# Patient Record
Sex: FEMALE | Race: WHITE | NOT HISPANIC OR LATINO | Employment: OTHER | URBAN - METROPOLITAN AREA
[De-identification: names, ages, dates, MRNs, and addresses within clinical notes are randomized per-mention and may not be internally consistent; named-entity substitution may affect disease eponyms.]

---

## 2017-08-25 ENCOUNTER — HISTORICAL (OUTPATIENT)
Dept: ADMINISTRATIVE | Facility: HOSPITAL | Age: 67
End: 2017-08-25

## 2017-10-04 ENCOUNTER — HISTORICAL (OUTPATIENT)
Dept: RADIOLOGY | Facility: HOSPITAL | Age: 67
End: 2017-10-04

## 2017-10-24 ENCOUNTER — HISTORICAL (OUTPATIENT)
Dept: RADIOLOGY | Facility: HOSPITAL | Age: 67
End: 2017-10-24

## 2017-10-27 ENCOUNTER — HISTORICAL (OUTPATIENT)
Dept: RESPIRATORY THERAPY | Facility: HOSPITAL | Age: 67
End: 2017-10-27

## 2017-11-01 ENCOUNTER — HISTORICAL (OUTPATIENT)
Dept: ADMINISTRATIVE | Facility: HOSPITAL | Age: 67
End: 2017-11-01

## 2017-12-11 ENCOUNTER — HISTORICAL (OUTPATIENT)
Dept: LAB | Facility: HOSPITAL | Age: 67
End: 2017-12-11

## 2017-12-13 ENCOUNTER — HISTORICAL (OUTPATIENT)
Dept: CARDIOLOGY | Facility: HOSPITAL | Age: 67
End: 2017-12-13

## 2018-02-27 ENCOUNTER — HISTORICAL (OUTPATIENT)
Dept: RADIOLOGY | Facility: HOSPITAL | Age: 68
End: 2018-02-27

## 2018-03-08 ENCOUNTER — HISTORICAL (OUTPATIENT)
Dept: RADIOLOGY | Facility: HOSPITAL | Age: 68
End: 2018-03-08

## 2018-03-20 ENCOUNTER — HISTORICAL (OUTPATIENT)
Dept: CARDIOLOGY | Facility: HOSPITAL | Age: 68
End: 2018-03-20

## 2018-03-22 ENCOUNTER — HISTORICAL (OUTPATIENT)
Dept: ADMINISTRATIVE | Facility: HOSPITAL | Age: 68
End: 2018-03-22

## 2018-03-26 ENCOUNTER — HISTORICAL (OUTPATIENT)
Dept: ADMINISTRATIVE | Facility: HOSPITAL | Age: 68
End: 2018-03-26

## 2018-05-17 ENCOUNTER — HISTORICAL (OUTPATIENT)
Dept: ADMINISTRATIVE | Facility: HOSPITAL | Age: 68
End: 2018-05-17

## 2018-06-01 ENCOUNTER — HISTORICAL (OUTPATIENT)
Dept: ADMINISTRATIVE | Facility: HOSPITAL | Age: 68
End: 2018-06-01

## 2018-07-17 ENCOUNTER — HOSPITAL ENCOUNTER (OUTPATIENT)
Dept: NUTRITION | Facility: HOSPITAL | Age: 68
End: 2018-07-18
Attending: INTERNAL MEDICINE | Admitting: INTERNAL MEDICINE

## 2018-07-31 ENCOUNTER — HISTORICAL (OUTPATIENT)
Dept: ADMINISTRATIVE | Facility: HOSPITAL | Age: 68
End: 2018-07-31

## 2018-08-14 ENCOUNTER — HISTORICAL (OUTPATIENT)
Dept: ADMINISTRATIVE | Facility: HOSPITAL | Age: 68
End: 2018-08-14

## 2018-10-16 ENCOUNTER — TELEPHONE (OUTPATIENT)
Dept: PULMONOLOGY | Facility: CLINIC | Age: 68
End: 2018-10-16

## 2018-10-16 NOTE — TELEPHONE ENCOUNTER
----- Message from Chana Newton sent at 10/16/2018 10:21 AM CDT -----  Contact: [phone]  ..Needs Advice    Reason for call:        Communication Preference:phone     Additional Information: pt would like to know if Dr. Long received her medical records?

## 2018-11-09 ENCOUNTER — TELEPHONE (OUTPATIENT)
Dept: PULMONOLOGY | Facility: CLINIC | Age: 68
End: 2018-11-09

## 2018-11-09 NOTE — TELEPHONE ENCOUNTER
I spoke to the pt to let her know I faxed her referral to Dr Espinal and Dr Grissom's office this morning. Pt verbalized understanding.

## 2018-11-09 NOTE — TELEPHONE ENCOUNTER
----- Message from Tanja Joyce sent at 11/9/2018  3:51 PM CST -----  Contact: Self  Pt is calling because she says Ms. Hussein was supposed to have her scheduled to see a doctor with [facility], per Dr. Long.  She is requesting a returned call.    She can be reached at [phone].    Thank you.

## 2018-11-26 ENCOUNTER — HISTORICAL (OUTPATIENT)
Dept: ADMINISTRATIVE | Facility: HOSPITAL | Age: 68
End: 2018-11-26

## 2019-02-11 ENCOUNTER — HISTORICAL (OUTPATIENT)
Dept: LAB | Facility: HOSPITAL | Age: 69
End: 2019-02-11

## 2019-02-11 LAB
CRP SERPL-MCNC: 0.7 MG/DL
ERYTHROCYTE [SEDIMENTATION RATE] IN BLOOD: 35 MM/HR (ref 0–20)

## 2019-02-26 ENCOUNTER — HISTORICAL (OUTPATIENT)
Dept: ADMINISTRATIVE | Facility: HOSPITAL | Age: 69
End: 2019-02-26

## 2019-02-26 LAB
ALBUMIN SERPL-MCNC: 3.5 GM/DL (ref 3.4–5)
ALP SERPL-CCNC: 96 UNIT/L (ref 38–126)
ALT SERPL-CCNC: 21 UNIT/L (ref 12–78)
AST SERPL-CCNC: 21 UNIT/L (ref 15–37)
BILIRUB SERPL-MCNC: 0.9 MG/DL (ref 0.2–1)
BILIRUBIN DIRECT+TOT PNL SERPL-MCNC: 0.2 MG/DL (ref 0–0.2)
BILIRUBIN DIRECT+TOT PNL SERPL-MCNC: 0.7 MG/DL (ref 0–0.8)
LIVER PROFILE INTERP: NORMAL
PROT SERPL-MCNC: 7.4 GM/DL (ref 6.4–8.2)

## 2019-03-12 ENCOUNTER — HISTORICAL (OUTPATIENT)
Dept: RADIOLOGY | Facility: HOSPITAL | Age: 69
End: 2019-03-12

## 2019-04-02 ENCOUNTER — HISTORICAL (OUTPATIENT)
Dept: PULMONOLOGY | Facility: HOSPITAL | Age: 69
End: 2019-04-02

## 2019-04-05 ENCOUNTER — HISTORICAL (OUTPATIENT)
Dept: ADMINISTRATIVE | Facility: HOSPITAL | Age: 69
End: 2019-04-05

## 2019-08-14 ENCOUNTER — HISTORICAL (OUTPATIENT)
Dept: ADMINISTRATIVE | Facility: HOSPITAL | Age: 69
End: 2019-08-14

## 2019-11-13 ENCOUNTER — HISTORICAL (OUTPATIENT)
Dept: ADMINISTRATIVE | Facility: HOSPITAL | Age: 69
End: 2019-11-13

## 2019-11-13 LAB
FLUAV AG NPH QL IA: NEGATIVE
FLUBV AG NPH QL IA: NEGATIVE

## 2019-12-23 ENCOUNTER — HISTORICAL (OUTPATIENT)
Dept: ADMINISTRATIVE | Facility: HOSPITAL | Age: 69
End: 2019-12-23

## 2020-03-11 ENCOUNTER — HISTORICAL (OUTPATIENT)
Dept: ADMINISTRATIVE | Facility: HOSPITAL | Age: 70
End: 2020-03-11

## 2020-03-12 ENCOUNTER — HISTORICAL (OUTPATIENT)
Dept: ADMINISTRATIVE | Facility: HOSPITAL | Age: 70
End: 2020-03-12

## 2020-07-07 ENCOUNTER — HISTORICAL (OUTPATIENT)
Dept: PULMONOLOGY | Facility: HOSPITAL | Age: 70
End: 2020-07-07

## 2020-07-13 ENCOUNTER — HISTORICAL (OUTPATIENT)
Dept: PULMONOLOGY | Facility: HOSPITAL | Age: 70
End: 2020-07-13

## 2020-07-15 ENCOUNTER — HISTORICAL (OUTPATIENT)
Dept: PULMONOLOGY | Facility: HOSPITAL | Age: 70
End: 2020-07-15

## 2020-07-16 ENCOUNTER — HISTORICAL (OUTPATIENT)
Dept: PULMONOLOGY | Facility: HOSPITAL | Age: 70
End: 2020-07-16

## 2020-07-21 ENCOUNTER — HISTORICAL (OUTPATIENT)
Dept: PULMONOLOGY | Facility: HOSPITAL | Age: 70
End: 2020-07-21

## 2020-07-24 ENCOUNTER — HISTORICAL (OUTPATIENT)
Dept: PULMONOLOGY | Facility: HOSPITAL | Age: 70
End: 2020-07-24

## 2020-07-27 ENCOUNTER — HISTORICAL (OUTPATIENT)
Dept: PULMONOLOGY | Facility: HOSPITAL | Age: 70
End: 2020-07-27

## 2020-07-29 ENCOUNTER — HISTORICAL (OUTPATIENT)
Dept: PULMONOLOGY | Facility: HOSPITAL | Age: 70
End: 2020-07-29

## 2020-07-31 ENCOUNTER — HISTORICAL (OUTPATIENT)
Dept: PULMONOLOGY | Facility: HOSPITAL | Age: 70
End: 2020-07-31

## 2020-08-03 ENCOUNTER — HISTORICAL (OUTPATIENT)
Dept: PULMONOLOGY | Facility: HOSPITAL | Age: 70
End: 2020-08-03

## 2020-08-05 ENCOUNTER — HISTORICAL (OUTPATIENT)
Dept: PULMONOLOGY | Facility: HOSPITAL | Age: 70
End: 2020-08-05

## 2020-08-07 ENCOUNTER — HISTORICAL (OUTPATIENT)
Dept: PULMONOLOGY | Facility: HOSPITAL | Age: 70
End: 2020-08-07

## 2020-08-10 ENCOUNTER — HISTORICAL (OUTPATIENT)
Dept: PULMONOLOGY | Facility: HOSPITAL | Age: 70
End: 2020-08-10

## 2020-08-12 ENCOUNTER — HISTORICAL (OUTPATIENT)
Dept: PULMONOLOGY | Facility: HOSPITAL | Age: 70
End: 2020-08-12

## 2020-08-13 ENCOUNTER — HISTORICAL (OUTPATIENT)
Dept: ADMINISTRATIVE | Facility: HOSPITAL | Age: 70
End: 2020-08-13

## 2020-08-19 ENCOUNTER — HISTORICAL (OUTPATIENT)
Dept: RESPIRATORY THERAPY | Facility: HOSPITAL | Age: 70
End: 2020-08-19

## 2020-08-31 ENCOUNTER — HISTORICAL (OUTPATIENT)
Dept: PULMONOLOGY | Facility: HOSPITAL | Age: 70
End: 2020-08-31

## 2020-09-02 ENCOUNTER — HISTORICAL (OUTPATIENT)
Dept: RESPIRATORY THERAPY | Facility: HOSPITAL | Age: 70
End: 2020-09-02

## 2020-09-04 ENCOUNTER — HISTORICAL (OUTPATIENT)
Dept: PULMONOLOGY | Facility: HOSPITAL | Age: 70
End: 2020-09-04

## 2020-09-09 ENCOUNTER — HISTORICAL (OUTPATIENT)
Dept: PULMONOLOGY | Facility: HOSPITAL | Age: 70
End: 2020-09-09

## 2020-09-11 ENCOUNTER — HISTORICAL (OUTPATIENT)
Dept: PULMONOLOGY | Facility: HOSPITAL | Age: 70
End: 2020-09-11

## 2020-09-14 ENCOUNTER — HISTORICAL (OUTPATIENT)
Dept: PULMONOLOGY | Facility: HOSPITAL | Age: 70
End: 2020-09-14

## 2020-09-16 ENCOUNTER — HISTORICAL (OUTPATIENT)
Dept: PULMONOLOGY | Facility: HOSPITAL | Age: 70
End: 2020-09-16

## 2020-09-21 ENCOUNTER — HISTORICAL (OUTPATIENT)
Dept: PULMONOLOGY | Facility: HOSPITAL | Age: 70
End: 2020-09-21

## 2020-09-23 ENCOUNTER — HISTORICAL (OUTPATIENT)
Dept: PULMONOLOGY | Facility: HOSPITAL | Age: 70
End: 2020-09-23

## 2020-09-25 ENCOUNTER — HISTORICAL (OUTPATIENT)
Dept: PULMONOLOGY | Facility: HOSPITAL | Age: 70
End: 2020-09-25

## 2020-09-28 ENCOUNTER — HISTORICAL (OUTPATIENT)
Dept: PULMONOLOGY | Facility: HOSPITAL | Age: 70
End: 2020-09-28

## 2020-09-30 ENCOUNTER — HISTORICAL (OUTPATIENT)
Dept: PULMONOLOGY | Facility: HOSPITAL | Age: 70
End: 2020-09-30

## 2020-10-05 ENCOUNTER — HISTORICAL (OUTPATIENT)
Dept: PULMONOLOGY | Facility: HOSPITAL | Age: 70
End: 2020-10-05

## 2020-10-07 ENCOUNTER — HISTORICAL (OUTPATIENT)
Dept: PULMONOLOGY | Facility: HOSPITAL | Age: 70
End: 2020-10-07

## 2020-10-14 ENCOUNTER — HISTORICAL (OUTPATIENT)
Dept: PULMONOLOGY | Facility: HOSPITAL | Age: 70
End: 2020-10-14

## 2020-10-16 ENCOUNTER — HISTORICAL (OUTPATIENT)
Dept: PULMONOLOGY | Facility: HOSPITAL | Age: 70
End: 2020-10-16

## 2020-10-19 ENCOUNTER — HISTORICAL (OUTPATIENT)
Dept: PULMONOLOGY | Facility: HOSPITAL | Age: 70
End: 2020-10-19

## 2020-10-21 ENCOUNTER — HISTORICAL (OUTPATIENT)
Dept: PULMONOLOGY | Facility: HOSPITAL | Age: 70
End: 2020-10-21

## 2020-11-02 ENCOUNTER — HISTORICAL (OUTPATIENT)
Dept: PULMONOLOGY | Facility: HOSPITAL | Age: 70
End: 2020-11-02

## 2020-11-05 ENCOUNTER — HISTORICAL (OUTPATIENT)
Dept: PULMONOLOGY | Facility: HOSPITAL | Age: 70
End: 2020-11-05

## 2020-11-06 ENCOUNTER — HISTORICAL (OUTPATIENT)
Dept: PULMONOLOGY | Facility: HOSPITAL | Age: 70
End: 2020-11-06

## 2020-11-09 ENCOUNTER — HISTORICAL (OUTPATIENT)
Dept: PULMONOLOGY | Facility: HOSPITAL | Age: 70
End: 2020-11-09

## 2020-11-10 ENCOUNTER — HISTORICAL (OUTPATIENT)
Dept: PULMONOLOGY | Facility: HOSPITAL | Age: 70
End: 2020-11-10

## 2021-01-04 ENCOUNTER — HISTORICAL (OUTPATIENT)
Dept: ADMINISTRATIVE | Facility: HOSPITAL | Age: 71
End: 2021-01-04

## 2021-01-27 ENCOUNTER — HISTORICAL (OUTPATIENT)
Dept: ADMINISTRATIVE | Facility: HOSPITAL | Age: 71
End: 2021-01-27

## 2021-04-13 ENCOUNTER — HISTORICAL (OUTPATIENT)
Dept: ADMINISTRATIVE | Facility: HOSPITAL | Age: 71
End: 2021-04-13

## 2021-05-19 ENCOUNTER — HISTORICAL (OUTPATIENT)
Dept: RADIOLOGY | Facility: HOSPITAL | Age: 71
End: 2021-05-19

## 2021-06-01 ENCOUNTER — HISTORICAL (OUTPATIENT)
Dept: ADMINISTRATIVE | Facility: HOSPITAL | Age: 71
End: 2021-06-01

## 2021-09-20 ENCOUNTER — HISTORICAL (OUTPATIENT)
Dept: ADMINISTRATIVE | Facility: HOSPITAL | Age: 71
End: 2021-09-20

## 2021-10-25 ENCOUNTER — HISTORICAL (OUTPATIENT)
Dept: ADMINISTRATIVE | Facility: HOSPITAL | Age: 71
End: 2021-10-25

## 2021-11-12 ENCOUNTER — HISTORICAL (OUTPATIENT)
Dept: ADMINISTRATIVE | Facility: HOSPITAL | Age: 71
End: 2021-11-12

## 2021-11-22 ENCOUNTER — HISTORICAL (OUTPATIENT)
Dept: ADMINISTRATIVE | Facility: HOSPITAL | Age: 71
End: 2021-11-22

## 2021-11-23 ENCOUNTER — HISTORICAL (OUTPATIENT)
Dept: ADMINISTRATIVE | Facility: HOSPITAL | Age: 71
End: 2021-11-23

## 2022-01-25 ENCOUNTER — HISTORICAL (OUTPATIENT)
Dept: ADMINISTRATIVE | Facility: HOSPITAL | Age: 72
End: 2022-01-25

## 2022-04-07 ENCOUNTER — HISTORICAL (OUTPATIENT)
Dept: ADMINISTRATIVE | Facility: HOSPITAL | Age: 72
End: 2022-04-07
Payer: MEDICARE

## 2022-04-23 VITALS
BODY MASS INDEX: 26.93 KG/M2 | SYSTOLIC BLOOD PRESSURE: 138 MMHG | HEIGHT: 63 IN | DIASTOLIC BLOOD PRESSURE: 64 MMHG | WEIGHT: 152 LBS | OXYGEN SATURATION: 96 %

## 2022-04-29 DIAGNOSIS — E04.9 GOITER: Primary | ICD-10-CM

## 2022-04-30 NOTE — OP NOTE
DATE OF SURGERY:        SURGEON:  Ewa Ray Jr., MD    PROCEDURES:  Fiberoptic bronchoscopy with bronchoalveolar lavage and bronchial brushings, left lower lobe.    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    COMPLICATIONS:  None.    ANESTHESIA:  Endobronchial xylocaine, Versed and fentanyl IV push.    PROCEDURE IN DETAIL:  Proper informed consent was obtained and signed by patient prior to procedure.  She was placed in supine position and fiberoptic bronchoscope was passed per right naris, which appeared to be normal upper airway, normal vocal cords, freely movable both pre and post bronchoscopy.  Trachea normal.  Nurys sharp.  Left lung airways were patent to all subsegmental bronchi without extrinsic compression or abnormal mucosa.  Right upper lobe was extrinsically compressed, unable to endoscopically image.  There was some extrinsic compression of the right middle lobe and distal bronchus intermedius.  Was able to inspect endobronchial airways, however, to subsegmental bronchi, right middle and right lower lobes, which appeared normal.  No significant secretions encountered.  The left lower lobe was entered with bronchoscope and quantitative bronchoalveolar lavage was performed with good return.  This was followed by fluoroscopic guidance of brushings, various subsegments of right lower lobe posterior.  Estimated blood loss less than 2 cc.  No complications.  The patient will be observed by post anesthesia recovery for conscious sedation and will be released home afterwards.  She will be followed up by me outpatient.        ______________________________  Ewa Ray Jr., MD JR/UB  DD:  11/23/2021  Time:  11:22AM  DT:  11/23/2021  Time:  11:33AM  Job #:  [id number]

## 2022-04-30 NOTE — ED PROVIDER NOTES
Patient:   Cinda Walden            MRN: [id number]            FIN: [id number]               Age:   67 years     Sex:  Female     :  1950   Associated Diagnoses:   Chest pain   Author:   Jeferson Moreno MD      Basic Information   Time seen: Date & time 2018 19:02:00.   History source: Patient, daughter.   Arrival mode: Private vehicle.   History limitation: None.   Additional information: Patient's physician(s): Herberth Amaya MD.      History of Present Illness   The patient presents with 66 yo F who is known to [facility] for hx of CAD and PVD presents with CC of chest pain. A rossana pac  and   I, Dr. Moreno, assumed care of this patient upon entering the room at 1902.     68 y/o  female with history of fungal lung disease , MAC, CAD,  HTN, and PVD presents to the ED with chest pain and sob onset yesterday. Patient states she has chronic sob due to her lung disease but it has been worse than usual and accompanied by chest pain and hypertension. She says her blood pressure was 200/100 yesterday but later went down. Patient reports she talked to Dr. Amaya's office today and they advised her to take Losartan and check her blood pressure and when she checked it was still elevated. She denies cough and fever. Patient is currently on Ranexa for chest pain and took Xanax around 1600. .  The onset was 2018 .  The course/duration of symptoms is constant.  Location: Left chest. Radiating pain: none. The character of symptoms is pressure.  The degree at onset was moderate.  The degree at maximum was moderate.  The degree at present is moderate.  The exacerbating factor is exertion.  The relieving factor is none.  Risk factors consist of coronary artery disease, hypertension and hyperlipidemia.  Prior episodes: angina and coronary artery disease.  Therapy today Xanax, Losartan .  Associated symptoms: shortness of breath, denies cough and denies fever.        Review of Systems   Constitutional  symptoms:  No fever, no chills.    Skin symptoms:  No rash,    ENMT symptoms:  Negative except as documented in HPI.   Respiratory symptoms:  Shortness of breath, No cough,    Cardiovascular symptoms:  Chest pain, left chest, pressure, hypertension .    Gastrointestinal symptoms:  No abdominal pain, no nausea, no vomiting, no diarrhea.    Genitourinary symptoms:  No dysuria, no hematuria.    Musculoskeletal symptoms:  No back pain,    Neurologic symptoms:  No headache, no dizziness.    Psychiatric symptoms:  No anxiety,              Additional review of systems information: All systems reviewed as documented in chart.      Health Status   Allergies: No known allergies.   Medications:  (Selected)   Prescriptions  Prescribed  Ventolin HFA 90 mcg/inh inhalation aerosol: 1-2 puffs, INH, QID, PRN PRN as needed for wheezing, # 1 EA, 11 Refill(s), Pharmacy: [facility]  budesonide-formoterol 160 mcg-4.5 mcg/inh inhalation aerosol: 2 puff(s), INH, BID, # 1 EA, 11 Refill(s), Pharmacy: [facility]  clarithromycin 500 mg oral tablet: 500 mg = 1 tab(s), Oral, Daily, # 30 tab(s), 11 Refill(s), Pharmacy: [facility] 415  ethambutol 400 mg oral tablet: 800 mg = 2 tab(s), Oral, Daily, # 60 tab(s), 6 Refill(s), Pharmacy: [facility]  rifampin 300 mg oral capsule: 600 mg = 2 cap(s), Oral, Daily, # 60 cap(s), 6 Refill(s), Pharmacy: [facility] 415  tiotropium 18 mcg inhalation capsule: 18 mcg = 1 cap(s), INH, Daily, # 30 cap(s), 11 Refill(s), Pharmacy: [facility]  Documented Medications  Documented  ALPRAZOLAM 0.5 MG TABLET: 0.5 mg = 1 tab(s), Oral, TID, PRN PRN anxiety  ATORVASTATIN 80 MG TABLET: 80 mg = 1 tab(s), Oral, qPM  CYCLOBENZAPRINE 10 MG TAB: 10 mg = 1 tab(s), Oral, At Bedtime  DULOXETINE HCL DR 60 MG CAP: 60 mg = 1 cap(s), Oral, Daily  ESTRADIOL 0.025 MG/DAY PATC: = 1 nataliia, TOP, F  Fosamax 70 mg oral tablet: 70 mg = 1 tab(s), Oral, Tu  HYDROCHLOROTHIAZIDE 25 MG T: 25 mg = 1  tab(s), Oral, Daily  LOSARTAN POTASSIUM 50 MG TA: 50 mg = 1 tab(s), Oral, Daily  PANTOPRAZOLE SOD DR 40 MG T: = 1 tab(s), Oral, BID  Plavix 75 mg oral tablet: 75 mg = 1 tab(s), Oral, Daily  RANEXA  MG TABLET: 500 mg = 1 tab(s), Oral, BID  RANITIDINE 300 MG TABLET: 300 mg = 1 tab(s), Oral, At Bedtime  TEMAZEPAM 30 MG CAPSULE: 30 mg = 1 cap(s), Oral, At Bedtime  VIT D2 1.25 MG (50,000 UNIT: 50,000 International unit = 1 cap(s), Oral, F  aspirin 81 mg oral tablet: 81 mg = 1 tab(s), Oral, Daily.   Immunizations: Unknown.      Past Medical/ Family/ Social History   Medical history:    Resolved  Obstructive sleep apnea (9683235883):  Resolved.  Comments:  5/19/2017 CDT 8:56 Alexia Benoit RRT  chronic  Diverticulitis (223939266):  Resolved.  HTN - Hypertension (9665541373):  Resolved.  Comments:  5/19/2017 CDT 8:55 Alexia Benoit RRT  chronic  Hyperlipidemia (30081530):  Resolved.  MVP - Mitral valve prolapse (4321831757):  Resolved.  Anxiety (45230337):  Resolved.  GERD - Gastro-esophageal reflux disease (4704073958):  Resolved.,   Asthma   Back pain, chronic   Chronic obstructive pulmonary disease   Depression   Hypercholesteremia   Hypoxemia   Mycobacterium avium complex   PVD - Peripheral vascular disease   .   Surgical history:    Hysterectomy (842597658).  History of partial resection of colon (1415973261).  Ankle replacement (156699189).  Comments:  8/10/2017 15:29 - Juliana YOUSIFT/EEG Tech, [place]  Left  Insertion of arterial stent ([id number]).  Comments:  12/11/2017 14:26 - Ruthie Tyson RN.  Bilateral leg  Excision of varicose vein (0905056).  Total ankle replacement (41518300).  Comments:  12/11/2017 14:27 - Ruthie Tyson RN.  left  Suspension of bladder (0432107).  Bilateral extraction of cataracts (359519301881834).  Colonoscopy (618656999).  Esophagogastroduodenoscopy (616389421)., Reviewed as documented in chart.   Family history:    Hypertension  Father  Mother  Diabetes  mellitus type 2  Mother  Snoring  Mother  Hypertension.  Mother  Prostate cancer  Brother  Fibromyalgia  Daughter  Diabetes                                                                                                                                                                                                                                07-JUN-2017 17:45:38<$>  Mother  Heart disease                                                                                                                                                                                                                           07-JUN-2017 17:48:24<$>  Mother  , Reviewed as documented in chart.   Social history: Alcohol use: Denies, Tobacco use: Denies, Drug use: Denies.      Physical Examination               Vital Signs   Vital Signs   7/17/2018 17:40 CDT      Temperature Oral          36.7 DegC                             Temperature Oral (calculated)             98.06 DegF                             Peripheral Pulse Rate     78 bpm                             Respiratory Rate          18 br/min                             SpO2                      95 %                             Oxygen Therapy            Room air                             Systolic Blood Pressure   171 mmHg  HI                             Diastolic Blood Pressure  82 mmHg  .   Measurements   7/17/2018 17:40 CDT      Weight Dosing             61.5 kg                             Weight Measured and Calculated in Lbs     135.58 lb                             Weight Estimated          61.5 kg                             Height/Length Dosing      157 cm                             Height/Length Estimated   157 cm                             Body Mass Index Estimated 24.95 kg/m2  .   Basic Oxygen Information   7/17/2018 17:40 CDT      SpO2                      95 %                             Oxygen Therapy            Room air  .   General:  Alert, no acute distress.     Skin:  Warm, dry, intact.    Head:  Normocephalic, atraumatic.    Neck:  Supple, trachea midline.    Eye:  Pupils are equal, round and reactive to light, extraocular movements are intact, normal conjunctiva.    Ears, nose, mouth and throat:  Oral mucosa moist.   Cardiovascular:  Regular rate and rhythm, No murmur, Normal peripheral perfusion, trace pitting edema in the LE.    Respiratory:  Respirations are non-labored, breath sounds are equal, Symmetrical chest wall expansion, wearing nasal cannula oxygen, slight inspiratory wheeze at the right base otherwise lungs are clear. .    Chest wall:  No tenderness.   Musculoskeletal:  Normal ROM, normal strength, no tenderness, no swelling, no deformity.    Gastrointestinal:  Soft, Nontender, Non distended.    Neurological:  Alert and oriented to person, place, time, and situation, No focal neurological deficit observed, CN II-XII intact, normal speech observed, normal coordination observed.    Psychiatric:  Cooperative.      Medical Decision Making   Documents reviewed:  Emergency department nurses' notes.   Orders  Launch Order Profile (Selected)   Inpatient Orders  Ordered  Discharge Planning Ongoing Assessment: 07/20/18 9:00:00 CDT, q3day  Fall Risk Protocol: 07/17/18 18:52:56 CDT, Constant Order  Canceled  Automated Diff: Now collect, 07/17/18 18:22:00 CDT, Blood, Collected, Stop date 07/17/18 18:22:00 CDT, Lab Collect, Print Label By Order Location, 07/17/18 17:42:00 CDT  Completed  CBC w/ Auto Diff: Now collect, 07/17/18 17:42:00 CDT, Blood, Stop date 07/17/18 17:42:00 CDT, Lab Collect, Print Label By Order Location, 07/17/18 17:42:00 CDT  CMP: Stat collect, 07/17/18 17:42:00 CDT, Blood, Stop date 07/17/18 17:42:00 CDT, Lab Collect, Print Label By Order Location, 07/17/18 17:42:00 CDT  EKG Adult 12 Lead: 07/17/18 19:20:00 CDT, Routine, Once, 07/17/18 19:20:00 CDT, Patient Bed, Patient Has IV, Standard Precautions, -1, -1, 07/17/18 19:20:00 CDT  Estimated  Glomerular Filtration Rate: Stat collect, 07/17/18 18:22:00 CDT, Blood, Collected, Stop date 07/17/18 18:22:00 CDT, Lab Collect, Print Label By Order Location, 07/17/18 17:42:00 CDT  Manual Diff: Now collect, 07/17/18 18:22:00 CDT, Blood, Collected, Stop date 07/17/18 18:22:00 CDT, Lab Collect, Print Label By Order Location, 07/17/18 17:42:00 CDT  POC Istat ER Troponin: Blood, Stat collect, Collected, 07/17/18 18:25:07 CDT  POC iSTAT ER Troponin request: Blood, Stat collect, 07/17/18 17:42:00 CDT by Kathryn Whitmore PA-C, Stop date 07/17/18 17:42:00 CDT, Lab Collect, Print Label By Order Location  Urinalysis Complete a reflex to culture: Stat collect, Urine, 07/17/18 17:42:00 CDT, Stop date 07/17/18 17:42:00 CDT, Nurse collect, Print Label By Order Location  XR Chest 2 Views: Stat, 07/17/18 17:42:00 CDT, Chest Pain, None, Patient Bed, Patient Has IV?, Rad Type, Not Scheduled, 07/17/18 17:42:00 CDT  aspirin: 324 mg, 4 tab(s), Tab-Chew, N/A, Once, Stop date 07/17/18 19:10:30 CDT, Physician Stop, 07/17/18 19:10:30 CDT  aspirin: 324 mg, form: Tab-Chew, Chewed, AdHoc, first dose 07/17/18 19:13:00 CDT, stop date 07/17/18 19:13:00 CDT  nitroglycerin: 1 in, Ointment, N/A, Once, Stop date 07/17/18 19:10:43 CDT, Physician Stop, 07/17/18 19:10:43 CDT  nitroglycerin: 1 in, form: Ointment, TOP, AdHoc, first dose 07/17/18 19:13:00 CDT, stop date 07/17/18 19:13:00 CDT.   Electrocardiogram:   Results review:  Lab results : Lab View   7/17/2018 18:45 CDT      UA Appear                 CLEAR                             UA Color                  YELLOW                             UA Spec Grav              1.007                             UA Bili                   Negative                             UA pH                     8.0                             UA Urobilinogen           0.2                             UA Blood                  Negative                             UA Glucose                Negative                              UA Ketones                Negative                             UA Protein                Negative                             UA Nitrite                Negative                             UA Leuk Est               Negative                             UA WBC                    NONE SEEN                             UA RBC                    NONE SEEN                             UA Bacteria               NONE SEEN /HPF                             UA Squam Epithelial       NONE SEEN    7/17/2018 18:25 CDT      POC Troponin              0.01 ng/mL    7/17/2018 18:22 CDT      Sodium Lvl                135 mmol/L  LOW                             Potassium Lvl             3.6 mmol/L                             Chloride                  98 mmol/L                             CO2                       27.0 mmol/L                             Calcium Lvl               9.0 mg/dL                             Glucose Lvl               82 mg/dL                             BUN                       11.0 mg/dL                             Creatinine                0.87 mg/dL                             eGFR-AA                   >60 mL/min/1.73 m2  NA                             eGFR-RO                  >60 mL/min/1.73 m2  NA                             Bili Total                0.7 mg/dL                             Bili Direct               0.20 mg/dL                             Bili Indirect             0.50 mg/dL                             AST                       28 unit/L                             ALT                       19 unit/L                             Alk Phos                  85 unit/L                             Total Protein             7.3 gm/dL                             Albumin Lvl               3.10 gm/dL  LOW                             Globulin                  4.20 gm/dL  HI                             A/G Ratio                 0.7 ratio  LOW                             WBC                       5.3  x10(3)/mcL                             RBC                       4.06 x10(6)/mcL  LOW                             Hgb                       11.6 gm/dL  LOW                             Hct                       34.9 %  LOW                             Platelet                  276 x10(3)/mcL                             MCV                       86.0 fL                             MCH                       28.6 pg                             MCHC                      33.2 gm/dL                             RDW                       13.3 %                             MPV                       9.0 fL  LOW                             Abs Neut                  2.79 x10(3)/mcL                             Neut Man                  58 %                             Lymph Man                 26 %                             Monocyte Man              13 %  HI                             Eos Man                   3 %                             Platelet Est              Normal                             Poik                      1  NA  .   Radiology results:  Rad Results (ST)  < 12 hrs   Accession: [id number]  Order: XR Chest 2 Views  Report Dt/Tm: 07/17/2018 18:18  Report:   Indication: Chest pain     FINDINGS: Compared to prior chest x-rays dated 3/26/2018 and 6/1/2018.  Heart size is normal. There is persistence of a mass with lobular  margins in the right mid to lower lung zone measuring 3.2 x 2.4 cm  which appears similar to the most recent prior chest x-ray of 6/1/2018  but is relatively decreased in size in comparison to 3/26/2018. The  left lung is clear. No free pleural fluid is visible. Pulmonary  vasculature is normal.     IMPRESSION: Right lung mass described above. This lesion has decreased  in size in comparison to 3/26/2018. Correlation with patient history  would be helpful.      .      Impression and Plan   Diagnosis   Chest pain ([id number]-FA R07.9)   Plan   Condition: Stable.    Disposition: Place in Observation  Telemetry Unit.    Counseled: Patient, Family, Regarding diagnosis, Regarding diagnostic results, Regarding treatment plan, Patient indicated understanding of instructions, family understood .    Notes: I, Nandini Jay, acted solely as a scribe for and in the presence of Dr. Moreno who performed the service..       Addendum      Teaching-Supervisory Addendum-Brief   Notes: I, Dr. Moreno, personally performed the services described in this documentation as scribed in my presence and it is both accurate and complete..

## 2022-05-01 NOTE — HISTORICAL [facility] CERNER
This is a historical note converted from Mora. Formatting and pictures may have been removed.  Please reference Mora for original formatting and attached multimedia. Chief Complaint  right knee pain from falling yesterday at [facility]  History of Present Illness  Patient is a 69-year-old female presenting with?a right knee pain due to a fall yesterday?at [facility]?in the back of the ER area?patient did not?get evaluated at the emergency room at [facility]?yesterday when accident occurred?but patient is complaining of?bilateral?knee pain?and she does have an abrasion?to the tib-fib?plateau area?in the kneecap area  Review of Systems  Constitutional_no fever, fatigue, weakness  Cardiovascular_no chest pain, tachycardia, bradycardia, arrhythmia  Respiratory_no shortness of breath, cough, wheezing, rhonchi  Musculoskeletal_right knee pain  Integumentary_abrasion to?tibial plateau area and kneecap area  Neurologic_no headache, no dizziness, no weakness or numbness  ?  Physical Exam  Vitals & Measurements  T:?37.1? ?C (Tympanic)? HR:?67(Peripheral)? RR:?16? BP:?161/74? SpO2:?94%?  HT:?160.00?cm? WT:?65.700?kg? BMI:?25.66?  VITAL SIGNS: ?Reviewed. ? ?  GENERAL:? In no apparent distress.?  CHEST:? Chest with clear breath sounds bilaterally.? No wheezes, rales, or rhonchi.?  CARDIAC:? Regular rate and rhythm.? S1 and S2, without murmurs, gallops, or rubs.  ABDOMEN:? Soft, without detectable tenderness.? No sign of distention.? No?? rebound or guarding, and no masses palpated.?? Bowel Sounds normal.  MUSCULOSKELETAL: Right?bilateral knee pain?increased with?ambulation and weightbearing?pain at a?five 6/10.? Mild increase in pain with varus valgus, no laxity noted with knee drawer. ?Circulation sensation present no paresthesias present.  Integumentary:?Abrasions to the?right tibial plateau?and knee  NEUROLOGIC EXAM:? Alert and oriented x 3.? No focal sensory or strength deficits.??  Speech normal.? Follows commands.  Assessment/Plan  1.?Right knee pain?M25.561  Ordered:  XR Knee Right 1 or 2 Views, Routine, 08/13/20 13:12:00 CDT, None, Patient Bed, Patient Has IV?, Rad Type, Right knee pain, Not Scheduled, 08/13/20 13:12:00 CDT  ?  2.?Skin abrasion?T14.8XXA  ?  3.?Strain of right knee?S86.911A  ?  Instructed patient to take?Tylenol for pain 500 mg p.o. 3 times daily as needed pain.  Instructed patient to wear?knee immobilizer that was given to her from the clinic no charge, wear knee brace for 5 to 7 days?if symptoms persist greater than 7 days notify clinic.  Instructed patient to apply?Neosporin to?the abrasions?twice daily until healed.  Instructed patient to?limit range of motion and weightbearing?until her knee?symptoms improve.? If this persist greater than 7 days notify clinic.   Problem List/Past Medical History  Ongoing  Anxiety  Asthma  Back pain, chronic  Chronic obstructive pulmonary disease  HTN - Hypertension  Hypercholesteremia  Hypoxemia  Mycobacterium avium complex  Obstructive sleep apnea  PVD - Peripheral vascular disease  Historical  Diverticulitis  GERD - Gastro-esophageal reflux disease  Hyperlipidemia  MVP - Mitral valve prolapse  Snoring  Procedure/Surgical History  Catheter placement in coronary artery(s) for coronary angiography, including intraprocedural injection(s) for coronary angiography, imaging supervision and interpretation; with left heart catheterization including intraprocedural injection(s) for left jes (07/18/2018)  Fluoroscopy of Left Heart using Other Contrast (07/18/2018)  Fluoroscopy of Multiple Coronary Arteries using Other Contrast (07/18/2018)  Measurement of Cardiac Sampling and Pressure, Left Heart, Percutaneous Approach (07/18/2018)  Biopsy, lung or mediastinum, percutaneous needle (03/20/2018)  Computerized Tomography (CT Scan) of Chest and Abdomen (03/20/2018)  Excision of Right Lung, Percutaneous Approach, Diagnostic (03/20/2018)  Catheter  placement in coronary artery(s) for coronary angiography, including intraprocedural injection(s) for coronary angiography, imaging supervision and interpretation; with left heart catheterization including intraprocedural injection(s) for left jes (12/13/2017)  Dilation of Coronary Artery, One Artery with Drug-eluting Intraluminal Device, Percutaneous Approach (12/13/2017)  Dilation of Coronary Artery, One Artery, Percutaneous Approach (12/13/2017)  Fluoroscopy of Multiple Coronary Arteries using Low Osmolar Contrast (12/13/2017)  Measurement of Cardiac Sampling and Pressure, Left Heart, Percutaneous Approach (12/13/2017)  Percutaneous transcatheter placement of drug eluting intracoronary stent(s), with coronary angioplasty when performed; single major coronary artery or branch (12/13/2017)  Ankle replacement  Bilateral extraction of cataracts  Colonoscopy  Esophagogastroduodenoscopy  Excision of varicose vein  History of partial resection of colon  Hysterectomy  Insertion of arterial stent  Suspension of bladder  Total ankle replacement   Medications  alPRAzolam 0.5 mg oral tablet, See Instructions, 1 refills  amikacin 250 mg/mL injectable solution, 7.5 mg/kg, IV, q12hr  Anoro Ellipta 62.5 mcg-25 mcg/inh inhalation powder  aspirin 81 mg oral tablet, 81 mg= 1 tab(s), Oral, Daily  ATORVASTATIN 80 MG TABLET, 80 mg= 1 tab(s), Oral, qPM  chlorpheniramine-hydrocodone 8 mg-10 mg/5 mL oral suspension, extended release, 5 mL, Oral, q12hr, PRN  clarithromycin 500 mg oral tablet, 500 mg= 1 tab(s), Oral, Daily, 11 refills  clarithromycin 500 mg oral tablet, extended release, 500 mg= 1 tab(s), Oral, Daily  cyclobenzaprine 10 mg oral tablet, 10 mg= 1 tab(s), Oral, At Bedtime, 2 refills  DULoxetine 60 mg oral delayed release capsule, See Instructions, 1 refills  ergocalciferol 50,000 intl units (1.25 mg) oral capsule, 15497 IntUnit= 1 cap(s), Oral, qWeek, 1 refills  estradiol 0.025 mg/24 hours weekly transdermal film, extended  release, 1 nataliia, TOP, F, 2 refills  ethambutol 400 mg oral tablet, 800 mg= 2 tab(s), Oral, Daily  Fosamax 70 mg oral tablet, 70 mg= 1 tab(s), Oral, Tu  HYDROCHLOROTHIAZIDE 25 MG T, 25 mg= 1 tab(s), Oral, Daily  LOSARTAN POTASSIUM 50 MG TA, 100 mg= 2 tab(s), Oral, Daily  PANTOPRAZOLE SOD DR 40 MG T, 1 tab(s), Oral, BID  Plavix 75 mg oral tablet, 75 mg= 1 tab(s), Oral, Daily,? ?Not taking: Last Dose Date/Time Unknown  RANEXA  MG TABLET, 500 mg= 1 tab(s), Oral, BID  RANITIDINE 300 MG TABLET, 300 mg= 1 tab(s), Oral, At Bedtime  sodium chloride 3% inhalation solution  temazepam 30 mg oral capsule, See Instructions, 5 refills  Tessalon 200 mg oral capsule, 200 mg= 1 cap(s), Oral, TID,? 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 Home equipment: Respiratory treatments., 08/10/2017  Lives with Spouse., 02/01/2017  Nutrition/Health  Caffeine intake amount: 1 cup coffee every morning., 08/10/2017  Caffeine intake amount: 2 cups coffee a day, 1 coke a day., 05/19/2017  Other  Substance Use  Never, 05/19/2017  Tobacco  Never (less than 100 in lifetime), N/A, 08/13/2020  Never (less than 100 in lifetime), N/A, 11/13/2019  Never (less than 100 in lifetime), No, 08/12/2019  Never (less than 100 in lifetime), N/A, 03/13/2019  Never (less than 100 in lifetime), No, 02/26/2019  Never smoker, No, 11/26/2018  Never smoker, 02/01/2017  Family History  Diabetes mellitus type 2: Mother.  Diabetes.....:  Mother.  Fibromyalgia: Daughter.  Heart disease.....: Mother.  Hypertension: Mother and Father.  Hypertension.: Mother.  Prostate cancer: Brother.  Snoring: Mother.  Immunizations  Vaccine Date Status   influenza virus vaccine, inactivated 10/14/2019 Recorded   influenza virus vaccine, inactivated 09/28/2018 Given   influenza virus vaccine, inactivated 10/16/2017 Given   influenza virus vaccine, inactivated 10/21/2016 Recorded   pneumococcal 23-polyvalent vaccine 10/21/2016 Recorded   influenza virus vaccine, inactivated 10/01/2015 Recorded   zoster vaccine live 03/13/2015 Recorded   tetanus/diphtheria/pertussis, acel(Tdap) 03/13/2015 Recorded   pneumococcal 13-valent conjugate vaccine 03/04/2015 Recorded   pneumococcal 23-polyvalent vaccine 10/10/2012 Recorded   poliovirus vaccine, live, trivalent 06/19/1961 Recorded   poliovirus vaccine, live, trivalent 05/05/1961 Recorded   poliovirus vaccine, live, trivalent 04/08/1958 Recorded   poliovirus vaccine, live, trivalent 02/21/1958 Recorded   smallpox vaccine 01/10/1958 Recorded   Health Maintenance  Health Maintenance  ???Pending?(in the next year)  ??? ??OverDue  ??? ? ? ?COPD Maintenance-Spirometry due??and every?  ??? ? ? ?Coronary Artery Disease Maintenance-Antiplatelet Agent Prescribed due??and every?  ??? ? ? ?Influenza Vaccine due??and every?  ??? ? ? ?Pneumococcal Vaccine due??and every?  ??? ? ? ?COPD Management-COPD Medications Prescribed due??12/11/18??and every 1??year(s)  ??? ? ? ?Aspirin Therapy for CVD Prevention due??07/18/19??and every 1??year(s)  ??? ? ? ?Hypertension Management-BMP due??07/31/19??and every 1??year(s)  ??? ? ? ?Alcohol Misuse Screening due??01/02/20??and every 1??year(s)  ??? ? ? ?Cognitive Screening due??01/02/20??and every 1??year(s)  ??? ? ? ?Functional Assessment due??01/02/20??and every 1??year(s)  ??? ??Due?  ??? ? ? ?Bone Density Screening due??08/02/20??and every 2??year(s)  ??? ? ? ?ADL Screening due??08/13/20??and every  1??year(s)  ??? ? ? ?COPD Maintenance-Pulmonary Rehab Education due??08/13/20??and every 1??year(s)  ??? ? ? ?Colorectal Screening due??08/13/20??and every 10??year(s)  ??? ? ? ?Depression Screening due??08/13/20??and every?  ??? ? ? ?Hypertension Management-Education due??08/13/20??and every 1??year(s)  ??? ? ? ?Medicare Annual Wellness Exam due??08/13/20??and every 1??year(s)  ??? ? ? ?Zoster Vaccine due??08/13/20??and every?  ??? ??Due In Future?  ??? ? ? ?Obesity Screening not due until??01/01/21??and every 1??year(s)  ??? ? ? ?Advance Directive not due until??01/02/21??and every 1??year(s)  ??? ? ? ?Fall Risk Assessment not due until??01/02/21??and every 1??year(s)  ??? ? ? ?Diabetes Screening not due until??07/30/21??and every 3??year(s)  ??? ? ? ?Breast Cancer Screening not due until??08/04/21??and every 2??year(s)  ???Satisfied?(in the past 1 year)  ??? ??Satisfied?  ??? ? ? ?Advance Directive on??08/13/20.??Satisfied by Madison Lockett  ??? ? ? ?Blood Pressure Screening on??08/13/20.??Satisfied by Cortez RTMadison L  ??? ? ? ?Body Mass Index Check on??08/13/20.??Satisfied by Cortez RTMadison L  ??? ? ? ?COPD Management-Oxygen Assessment on??08/13/20.??Satisfied by Cortez RTMadison L  ??? ? ? ?Fall Risk Assessment on??08/13/20.??Satisfied by Diego RTMadison L  ??? ? ? ?Hypertension Management-Blood Pressure on??08/13/20.??Satisfied by Madison Lockett  ??? ? ? ?Influenza Vaccine on??10/14/19.??Satisfied by Duane Teresa LPN  ??? ? ? ?Obesity Screening on??08/13/20.??Satisfied by Madison Lockett  ?

## 2022-05-01 NOTE — HISTORICAL [facility] CERNER
This is a historical note converted from Mora. Formatting and pictures may have been removed.  Please reference Mora for original formatting and attached multimedia. Chief Complaint  cough, back and rib pain x 2 days  History of Present Illness  Pt?70 Years?White?Female?seen and evaluated in a limited status for concern for COVID-19. In order to decrease the risk of exposure to the hospital and health care workers, only a limited exam was done.  ?   Risk Factors Include  Chronic lung disease, hx of mycobacterium, 2 days of cough, back and rib pain.?On clarithromycin and ethambultol for MAC.  ?   Provider Precautions  N95 Mask,  Standard Isolation Gown,  Gloves,  Eye Protection- Face Shield  ?  Covid Screening?  No confirmed close contact  No travel to high risk area  No Recent Testing Done  Review of Systems  Constitutional_negative for fever  ENMT_+rhinorrhea, + sinus pressure, + sore throat, no blurry vision or change in vision  Respiratory_+ cough  Gastrointestinal_negative for nausea or vomiting  Integumentary_negative for rash  Physical Exam  Vitals & Measurements  T:?36.8? ?C (Tympanic)? HR:?70(Peripheral)? RR:?20? BP:?153/75? SpO2:?95%?  HT:?160.00?cm? WT:?63.500?kg? BMI:?24.8?  Gen: WD NAD  CV: S1S2 RRR no MRG  Resp:?wheeze RUL and LLL  MS: TTP to mid chest posterior, lateral but not anteriorly on palpation  Integument: no acute lesions to chest wall  HEENT: clear rhinorrhea, no cervical ILAN, TMs without bulging or erythema bilaterally, no posterior pharyngeal erythema  Psych: Cooperative, approp mood and affect  Assessment/Plan  1.?Chest congestion?R09.89  X ray of the chest done today, per my review?slight worsening?mid lung right and?left side compared to chest x-ray?2/20.  We will also call with final report.  Ordered:  betamethasone, 6 mg, IM, Once-Unscheduled, first dose 01/04/21 16:23:00 CST  cefTRIAXone, 1 gm, IM, Once-Unscheduled, first dose 01/04/21 16:23:00 CST  levoFLOXacin, 750 mg = 1  tab(s), Oral, q24hr, X 7 day(s), # 7 tab(s), 0 Refill(s), Pharmacy: [facility], 160, cm, Height/Length Dosing, 01/04/21 14:07:00 CST, 63.5, kg, Weight Dosing, 01/04/21 14:07:00 CST  predniSONE, 20 mg = 1 tab(s), Oral, BID, with food or milk, X 3 day(s), # 6 tab(s), 0 Refill(s), Pharmacy: [facility] 415, 160, cm, Height/Length Dosing, 01/04/21 14:07:00 CST, 63.5, kg, Weight Dosing, 01/04/21 14:07:00 CST  Office/Outpatient Visit Level 4 Established 84998 PC, Chest congestion  Cough  Costochondritis  Bilateral pneumonia, UCC-RR, 01/04/21 16:23:00 CST  XR Chest 2 Views, Routine, 01/04/21 15:39:00 CST, None, Patient Bed, Patient Has IV?, Rad Type, Chest congestion  Cough, Not Scheduled, 01/04/21 15:39:00 CST  ?  2.?Cough?R05  ?rapid COVID test negative  Ordered:  betamethasone, 6 mg, IM, Once-Unscheduled, first dose 01/04/21 16:23:00 CST  cefTRIAXone, 1 gm, IM, Once-Unscheduled, first dose 01/04/21 16:23:00 CST  levoFLOXacin, 750 mg = 1 tab(s), Oral, q24hr, X 7 day(s), # 7 tab(s), 0 Refill(s), Pharmacy: [facility], 160, cm, Height/Length Dosing, 01/04/21 14:07:00 CST, 63.5, kg, Weight Dosing, 01/04/21 14:07:00 CST  predniSONE, 20 mg = 1 tab(s), Oral, BID, with food or milk, X 3 day(s), # 6 tab(s), 0 Refill(s), Pharmacy: [facility], 160, cm, Height/Length Dosing, 01/04/21 14:07:00 CST, 63.5, kg, Weight Dosing, 01/04/21 14:07:00 CST  Office/Outpatient Visit Level 4 Established 68083 PC, Chest congestion  Cough  Costochondritis  Bilateral pneumonia, UCC-RR, 01/04/21 16:23:00 CST  XR Chest 2 Views, Routine, 01/04/21 15:39:00 CST, None, Patient Bed, Patient Has IV?, Rad Type, Chest congestion  Cough, Not Scheduled, 01/04/21 15:39:00 CST  ?  3.?Costochondritis?M94.0  Injection of Celestone given today.?  Ordered:  betamethasone, 6 mg, IM, Once-Unscheduled, first dose 01/04/21 16:23:00 CST  cefTRIAXone, 1 gm, IM, Once-Unscheduled, first dose 01/04/21 16:23:00 CST  levoFLOXacin, 750 mg  = 1 tab(s), Oral, q24hr, X 7 day(s), # 7 tab(s), 0 Refill(s), Pharmacy: [facility], 160, cm, Height/Length Dosing, 01/04/21 14:07:00 CST, 63.5, kg, Weight Dosing, 01/04/21 14:07:00 CST  predniSONE, 20 mg = 1 tab(s), Oral, BID, with food or milk, X 3 day(s), # 6 tab(s), 0 Refill(s), Pharmacy: [facility] 415, 160, cm, Height/Length Dosing, 01/04/21 14:07:00 CST, 63.5, kg, Weight Dosing, 01/04/21 14:07:00 CST  Office/Outpatient Visit Level 4 Established 68026 PC, Chest congestion  Cough  Costochondritis  Bilateral pneumonia, UCC-RR, 01/04/21 16:23:00 CST  ?  4.?Bilateral pneumonia?J18.9  ?Rocephin injection given.? Start Levaquin tomorrow am, start prednisone tomorrow am with food.  ER for worsening symptoms.  Ordered:  betamethasone, 6 mg, IM, Once-Unscheduled, first dose 01/04/21 16:23:00 CST  cefTRIAXone, 1 gm, IM, Once-Unscheduled, first dose 01/04/21 16:23:00 CST  levoFLOXacin, 750 mg = 1 tab(s), Oral, q24hr, X 7 day(s), # 7 tab(s), 0 Refill(s), Pharmacy: [facility] 415, 160, cm, Height/Length Dosing, 01/04/21 14:07:00 CST, 63.5, kg, Weight Dosing, 01/04/21 14:07:00 CST  predniSONE, 20 mg = 1 tab(s), Oral, BID, with food or milk, X 3 day(s), # 6 tab(s), 0 Refill(s), Pharmacy: [facility], 160, cm, Height/Length Dosing, 01/04/21 14:07:00 CST, 63.5, kg, Weight Dosing, 01/04/21 14:07:00 CST  Office/Outpatient Visit Level 4 Established [id number] PC, Chest congestion  Cough  Costochondritis  Bilateral pneumonia, UCC-RR, 01/04/21 16:23:00 CST  ?  Referrals  Clinic Follow up, *Est. 02/04/21 3:00:00 CST, Eufemia, Order for future visit, Mycobacterium avium complex  Chronic obstructive pulmonary disease  HTN - Hypertension, [facility]   Problem List/Past Medical History  Ongoing  Anxiety  Asthma  Back pain, chronic  Chronic obstructive pulmonary disease  HTN - Hypertension  Hypercholesteremia  Hypoxemia  Mycobacterium avium complex  Obstructive sleep apnea  PVD - Peripheral vascular  disease  Historical  Diverticulitis  GERD - Gastro-esophageal reflux disease  Hyperlipidemia  MVP - Mitral valve prolapse  Snoring  Procedure/Surgical History  Catheter placement in coronary artery(s) for coronary angiography, including intraprocedural injection(s) for coronary angiography, imaging supervision and interpretation; with left heart catheterization including intraprocedural injection(s) for left jes (07/18/2018)  Fluoroscopy of Left Heart using Other Contrast (07/18/2018)  Fluoroscopy of Multiple Coronary Arteries using Other Contrast (07/18/2018)  Measurement of Cardiac Sampling and Pressure, Left Heart, Percutaneous Approach (07/18/2018)  Biopsy, lung or mediastinum, percutaneous needle (03/20/2018)  Computerized Tomography (CT Scan) of Chest and Abdomen (03/20/2018)  Excision of Right Lung, Percutaneous Approach, Diagnostic (03/20/2018)  Catheter placement in coronary artery(s) for coronary angiography, including intraprocedural injection(s) for coronary angiography, imaging supervision and interpretation; with left heart catheterization including intraprocedural injection(s) for left jes (12/13/2017)  Dilation of Coronary Artery, One Artery with Drug-eluting Intraluminal Device, Percutaneous Approach (12/13/2017)  Dilation of Coronary Artery, One Artery, Percutaneous Approach (12/13/2017)  Fluoroscopy of Multiple Coronary Arteries using Low Osmolar Contrast (12/13/2017)  Measurement of Cardiac Sampling and Pressure, Left Heart, Percutaneous Approach (12/13/2017)  Percutaneous transcatheter placement of drug eluting intracoronary stent(s), with coronary angioplasty when performed; single major coronary artery or branch (12/13/2017)  Ankle replacement  Bilateral extraction of cataracts  Colonoscopy  Esophagogastroduodenoscopy  Excision of varicose vein  History of partial resection of colon  Hysterectomy  Insertion of arterial stent  Suspension of bladder  Total ankle replacement    Medications  albuterol-ipratropium 2.5 mg-0.5 mg/3 mL inhalation solution, 3 mL, NEB, Daily  alPRAzolam 0.5 mg oral tablet, See Instructions, 1 refills  amLODIPine 5 mg oral tablet, 5 mg= 1 tab(s), Oral, Daily  Anoro Ellipta 62.5 mcg-25 mcg/inh inhalation powder  aspirin 81 mg oral tablet, 81 mg= 1 tab(s), Oral, Daily  ATORVASTATIN 80 MG TABLET, 80 mg= 1 tab(s), Oral, qPM  clarithromycin 500 mg oral tablet, extended release, 500 mg= 1 tab(s), Oral, Daily  cloniDINE 0.1 mg oral tablet, 0.1 mg= 1 tab(s), Oral, q8hr  cyclobenzaprine 10 mg oral tablet, 10 mg= 1 tab(s), Oral, At Bedtime, 2 refills  Daliresp 250 mcg oral tablet, 250 mcg= 1 tab(s), Oral, Daily  DULoxetine 60 mg oral delayed release capsule, See Instructions, 1 refills  ergocalciferol 50,000 intl units (1.25 mg) oral capsule, 01846 IntUnit= 1 cap(s), Oral, qWeek, 1 refills  estradiol 0.025 mg/24 hours weekly transdermal film, extended release, 1 nataliia, TOP, F, 2 refills  ethambutol 400 mg oral tablet, 800 mg= 2 tab(s), Oral, Daily  famotidine 20 mg oral tablet, Oral, BID  famotidine 40 mg oral tablet, 40 mg= 1 tab(s), Oral, Daily  Flonase 50 mcg/inh nasal spray, 2 spray(s), Nasal, Daily, 1 refills  Fosamax 70 mg oral tablet, 70 mg= 1 tab(s), Oral, Tu  losartan 100 mg oral tablet, 100 mg= 1 tab(s), Oral, Daily  LOSARTAN POTASSIUM 50 MG TA, 100 mg= 2 tab(s), Oral, Daily  PANTOPRAZOLE SOD DR 40 MG T, 1 tab(s), Oral, BID  sodium chloride 3% inhalation solution  temazepam 30 mg oral capsule, See Instructions, 5 refills  tiotropium 18 mcg inhalation capsule, 18 mcg= 1 cap(s), INH, Daily, 11 refills  Toprol-XL 25 mg oral tablet, extended release, 25 mg= 1 tab(s), Oral, Daily, 3 refills  Ventolin HFA 90 mcg/inh inhalation aerosol, 1-2 puffs, INH, QID, PRN, 11 refills  Allergies  No Known Medication Allergies  Social History  Abuse/Neglect  No, 01/04/2021  No, 11/24/2020  No, 11/10/2020  No, 09/17/2020  No, No, Yes, 08/22/2020  No, 08/13/2020  No, 11/13/2019  No,  08/12/2019  Alcohol  Never, 12/13/2017  Employment/School  Retired, 08/10/2017  Work/School description: PHARMACY TECH., 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 Home equipment: Respiratory treatments., 08/10/2017  Lives with Spouse., 02/01/2017  Nutrition/Health  Caffeine intake amount: 1 cup coffee every morning., 08/10/2017  Caffeine intake amount: 2 cups coffee a day, 1 coke a day., 05/19/2017  Other  Substance Use  Never, 05/19/2017  Tobacco  Never (less than 100 in lifetime), No, 01/04/2021  Family History  Diabetes mellitus type 2: Mother.  Diabetes.....: Mother.  Fibromyalgia: Daughter.  Heart disease.....: Mother.  Hypertension: Mother and Father.  Hypertension.: Mother.  Prostate cancer: Brother.  Snoring: Mother.  Immunizations  Vaccine Date Status   influenza virus vaccine, inactivated 10/13/2020 Recorded   influenza virus vaccine, inactivated 10/14/2019 Recorded   influenza virus vaccine, inactivated 09/28/2018 Given   influenza virus vaccine, inactivated 10/16/2017 Given   influenza virus vaccine, inactivated 10/21/2016 Recorded   pneumococcal 23-polyvalent vaccine 10/21/2016 Recorded   influenza virus vaccine, inactivated 10/01/2015 Recorded   zoster vaccine live 03/13/2015 Recorded   tetanus/diphtheria/pertussis, acel(Tdap) 03/13/2015 Recorded   pneumococcal 13-valent conjugate vaccine 03/04/2015 Recorded   pneumococcal 23-polyvalent vaccine 10/10/2012 Recorded   poliovirus vaccine, live, trivalent 06/19/1961 Recorded   poliovirus vaccine, live, trivalent 05/05/1961 Recorded   poliovirus vaccine, live, trivalent 04/08/1958 Recorded   poliovirus vaccine, live, trivalent 02/21/1958 Recorded   smallpox vaccine 01/10/1958 Recorded   Health Maintenance  Health Maintenance  ???Pending?(in the next year)  ??? ??OverDue  ??? ? ? ?Aspirin Therapy for CVD Prevention due??07/18/19??and every 1??year(s)  ??? ? ? ?Hypertension Management-BMP due??07/31/19??and every 1??year(s)  ??? ? ? ?Bone  Density Screening due??08/02/20??and every 2??year(s)  ??? ? ? ?Cognitive Screening due??01/02/21??and every 1??year(s)  ??? ? ? ?Functional Assessment due??01/02/21??and every 1??year(s)  ??? ??Due?  ??? ? ? ?Alcohol Misuse Screening due??01/02/21??and every 1??year(s)  ??? ? ? ?ADL Screening due??01/04/21??and every 1??year(s)  ??? ? ? ?COPD Maintenance-Pulmonary Rehab Education due??01/04/21??and every 1??year(s)  ??? ? ? ?Colorectal Screening due??01/04/21??and every 10??year(s)  ??? ? ? ?Depression Screening due??01/04/21??Unknown Frequency  ??? ? ? ?Hypertension Management-Education due??01/04/21??and every 1??year(s)  ??? ? ? ?Medicare Annual Wellness Exam due??01/04/21??and every 1??year(s)  ??? ? ? ?Zoster Vaccine due??01/04/21??Unknown Frequency  ??? ??Due In Future?  ??? ? ? ?Diabetes Screening not due until??07/30/21??and every 3??year(s)  ??? ? ? ?Influenza Vaccine not due until??10/01/21??and every 1??day(s)  ??? ? ? ?COPD Management-COPD Medications Prescribed not due until??11/10/21??and every 1??year(s)  ??? ? ? ?Blood Pressure Screening not due until??11/24/21??and every 1??year(s)  ??? ? ? ?Hypertension Management-Blood Pressure not due until??11/24/21??and every 1??year(s)  ??? ? ? ?COPD Management-Oxygen Assessment not due until??11/24/21??and every 1??year(s)  ??? ? ? ?Obesity Screening not due until??01/01/22??and every 1??year(s)  ??? ? ? ?Advance Directive not due until??01/02/22??and every 1??year(s)  ??? ? ? ?Fall Risk Assessment not due until??01/02/22??and every 1??year(s)  ???Satisfied?(in the past 1 year)  ??? ??Satisfied?  ??? ? ? ?Advance Directive on??01/04/21.??Satisfied by Mariam Hernández LPN  ??? ? ? ?Blood Pressure Screening on??11/24/20.??Satisfied by Duane Teresa LPN  ??? ? ? ?Body Mass Index Check on??01/04/21.??Satisfied by Mariam Hernández LPN  ??? ? ? ?Breast Cancer Screening on??09/04/20.??Satisfied by Elizabeth Tunrer  ??? ? ? ?COPD Maintenance-Spirometry  on??09/02/20.??Satisfied by Margarita Ramos  ??? ? ? ?COPD Management-COPD Medications Prescribed on??11/10/20.  ??? ? ? ?COPD Management-Oxygen Assessment on??11/24/20.??Satisfied by Duane Teresa LPN  ??? ? ? ?Cervical Cancer Screening on??09/03/20.??Satisfied by Julius Rich  ??? ? ? ?Fall Risk Assessment on??01/04/21.??Satisfied by Mariam Hernández LPN  ??? ? ? ?Hypertension Management-Blood Pressure on??11/24/20.??Satisfied by Duane Teresa LPN  ??? ? ? ?Influenza Vaccine on??10/13/20.??Satisfied by Duane Teresa LPN  ??? ? ? ?Obesity Screening on??01/04/21.??Satisfied by Mariam Hernández LPN  ?

## 2022-05-04 ENCOUNTER — HOSPITAL ENCOUNTER (OUTPATIENT)
Dept: RADIOLOGY | Facility: HOSPITAL | Age: 72
Discharge: HOME | End: 2022-05-04
Attending: CLINICAL NURSE SPECIALIST
Payer: MEDICARE

## 2022-05-04 DIAGNOSIS — J44.9 CHRONIC OBSTRUCTIVE PULMONARY DISEASE (COPD): Primary | ICD-10-CM

## 2022-05-04 DIAGNOSIS — E04.9 GOITER: ICD-10-CM

## 2022-05-04 DIAGNOSIS — R06.02 SOB (SHORTNESS OF BREATH): ICD-10-CM

## 2022-05-04 PROCEDURE — 76536 US EXAM OF HEAD AND NECK: CPT | Mod: TC

## 2022-05-31 ENCOUNTER — HOSPITAL ENCOUNTER (OUTPATIENT)
Dept: PULMONOLOGY | Facility: HOSPITAL | Age: 72
Discharge: HOME | End: 2022-05-31
Payer: MEDICARE

## 2022-05-31 DIAGNOSIS — J44.9 COPD (CHRONIC OBSTRUCTIVE PULMONARY DISEASE): ICD-10-CM

## 2022-05-31 PROCEDURE — 94625 PHY/QHP OP PULM RHB W/O MNTR: CPT

## 2022-05-31 NOTE — PROGRESS NOTES
"O2: NC 2L/min  EXERCISE SUMMARY: Pt completed initial intake for entry into pulmonary rehab. Pt performed 1:00 sit-to-stand test to assess aerobic endurance and leg strength. Pt completed 17 repetitions in 1:00, correlates to VO2 of 12.2 ml/kg/min (3.48 METs). No S/S noted or reported, VS responses appropriate. Pt stable and asymptomatic at discharge   PLAN FOR NEXT VISIT: Continue Ex Rx with goal of 38:00-45:00 of total exercise time   EDUCATION: Orientation to pulmonary rehab gym and equipment.       PULMONARY REHAB - INITIAL ASSESSMENT                           EXERCISE/FUNCTIONAL CAPACITY     EXERCISE TEST: 1:00 SIT/STAND      VO2/METs:  12.2/3.48  DASI SCORE/METS: 9.95/3.97 mets  CURRENT HOME EXERCISE:  none    INTERVENTION: CURRENT PULMONARY REHAB EXERCISE PRESCRIPTION                           MODE  DAYS PER WEEK  INTENSITY/WORKLOAD  DURATION    TREADMILL  2 1.2 mph   8:00    ARM ERGOMETER  2 25 kim   8:00   NU-STEP  2 25 kim   8:00          NUTRITION/WEIGHT MANAGEMENT      HEIGHT: 63"    WEIGHT:  155 lbs                            BMI: 27.45  RAPID EATING ASSESSMENT SCORE:    29/39             PSYCHOSOCIAL     ASSESSMENT:  AIDEE-7 SCORE:    6    ANXIETY LEVEL: mild                                                        DEPRESSION     ASSESSMENT: PHQ - 9 SCORE: 4         DEPRESSION SEVERITY: mild      TOBACCO USAGE     ASSESSMENT: CURRENT TOBACCO USAGE:   none                                                           OXYGEN/DYSPNEA      CURRENT O2 REQUIREMENTS:  HOME:  NC 2L/min  EXERCISE: titrate to keep O2 above 88%       MMRC DYSPNEA SCORE:  2   CAT SCORE: 21           PATIENT GOALS     GOAL STATUS   Increase endurance and strength Initial goal   Lose 5-10 pounds "   Learn stress management techniques "   Reduce feeling of depression related to health "   Learn techniques to manage dyspnea "                     EDUCATION LOG     DATE DOMAIN TOPIC   5/31/22 PULM. REHAB ORIENTATION TO PULMONARY REHAB " PROGRAM

## 2022-06-07 ENCOUNTER — HOSPITAL ENCOUNTER (OUTPATIENT)
Dept: PULMONOLOGY | Facility: HOSPITAL | Age: 72
Discharge: HOME | End: 2022-06-07
Payer: MEDICARE

## 2022-06-07 DIAGNOSIS — J44.9 COPD (CHRONIC OBSTRUCTIVE PULMONARY DISEASE): ICD-10-CM

## 2022-06-07 PROCEDURE — 94625 PHY/QHP OP PULM RHB W/O MNTR: CPT

## 2022-06-07 NOTE — PROGRESS NOTES
Session #: 2  Arrival time: 9:15   Departure time: 10:05  Total clinic time: 50:00   TIME WORKLOAD HR RPD BP O2 sat %   RESTING   71 0 122/64 97   ARM ERGO 5:00 NDIAYE:                  77 1  98   NUSTEP 5:00 NDIAYE: 82 2  94   TREADMILL 5:00 MPH/thGthRthAthDthEth:th th8th6th 5  95   RECOVERY   79 0.5 118/66 96   EXERCISE TIME           OXYGEN: NC 2L/min  EXERCISE SUMMARY: Patient able to complete 15:00 of aerobic exercise on three machines. No signs/symptoms noted or reported, vital sign responses appropriate. Patient stable and asymptomatic at discharge.  PLAN FOR NEXT SESSION: Continue exercise prescription with goal of 38:00-45:00 total exercise time.  EDUCATION:  Warm up and cooldown for pulmonary exercise. Patient verbalized appropriate understanding, instructed to ask any questions during subsequent sessions.  NOTES: First exercise session.

## 2022-06-09 ENCOUNTER — HOSPITAL ENCOUNTER (OUTPATIENT)
Dept: PULMONOLOGY | Facility: HOSPITAL | Age: 72
Discharge: HOME | End: 2022-06-09
Payer: MEDICARE

## 2022-06-09 DIAGNOSIS — J44.9 CHRONIC OBSTRUCTIVE PULMONARY DISEASE (COPD): ICD-10-CM

## 2022-06-09 PROCEDURE — 94625 PHY/QHP OP PULM RHB W/O MNTR: CPT

## 2022-06-09 NOTE — PROGRESS NOTES
"Session #: 3  Arrival time: 9:23   Departure time: 9:48  Total clinic time: 25:00   TIME WORKLOAD HR RPD BP O2 sat %   RESTING   70 3 124/68 96   ARM ERGO 6 NDIAYE: 30                74 3  97   NUSTEP 5 NDIAYE: 35 78 6  97   TREADMILL 5 MPH/GRADE: 1.5/0 82 7  96   RECOVERY   74 2 126/60 97   EXERCISE TIME 16          OXYGEN: NC 3L/min  EXERCISE SUMMARY: Patient able to complete 16:00 of aerobic exercise on three machines. No signs/symptoms noted or reported, vital sign responses appropriate. Patient stable and asymptomatic at discharge.  PLAN FOR NEXT SESSION: Continue exercise prescription with goal of 38:00-45:00 total exercise time.  EDUCATION:  Patient given handout "Understanding Lung Disease, instructed to ask any questions during subsequent sessions.  NOTES: Pt. Very deconditioned but will try to increase at least 1:00 per session as tolerated.    "

## 2022-06-10 ENCOUNTER — PROCEDURE VISIT (OUTPATIENT)
Dept: RESPIRATORY THERAPY | Facility: HOSPITAL | Age: 72
End: 2022-06-10
Attending: INTERNAL MEDICINE
Payer: MEDICARE

## 2022-06-10 VITALS — RESPIRATION RATE: 24 BRPM | HEART RATE: 79 BPM | OXYGEN SATURATION: 98 %

## 2022-06-10 DIAGNOSIS — J44.9 CHRONIC OBSTRUCTIVE PULMONARY DISEASE (COPD): Primary | ICD-10-CM

## 2022-06-10 DIAGNOSIS — R06.02 SOB (SHORTNESS OF BREATH): ICD-10-CM

## 2022-06-10 LAB
DLCO ADJ PRE: 10.29 ML/(MIN*MMHG) (ref 15.17–26.63)
DLCO SINGLE BREATH LLN: 15.17
DLCO SINGLE BREATH PRE REF: 49.2 %
DLCO SINGLE BREATH REF: 20.9
DLCOC SBVA LLN: 2.84
DLCOC SBVA PRE REF: 94.1 %
DLCOC SBVA REF: 4.32
DLCOC SINGLE BREATH LLN: 15.17
DLCOC SINGLE BREATH PRE REF: 49.2 %
DLCOC SINGLE BREATH REF: 20.9
DLCOCSBVAULN: 5.8
DLCOCSINGLEBREATHULN: 26.63
DLCOSINGLEBREATHULN: 26.63
DLCOVA LLN: 2.84
DLCOVA PRE REF: 94.1 %
DLCOVA PRE: 4.07 ML/(MIN*MMHG*L) (ref 2.84–5.8)
DLCOVA REF: 4.32
DLCOVAULN: 5.8
DLVAADJ PRE: 4.07 ML/(MIN*MMHG*L) (ref 2.84–5.8)
ERV LLN: -16449.36
ERV PRE REF: 57 %
ERV REF: 0.64
ERVULN: ABNORMAL
FEF 25 75 LLN: 0.84
FEF 25 75 PRE REF: 8.5 %
FEF 25 75 REF: 1.82
FET100 CHG: -8.5 %
FEV05 LLN: 0.82
FEV05 REF: 1.67
FEV1 CHG: 10.7 %
FEV1 FVC LLN: 65
FEV1 FVC PRE REF: 46 %
FEV1 FVC REF: 78
FEV1 LLN: 1.56
FEV1 PRE REF: 31 %
FEV1 REF: 2.14
FEV1 VOL CHG: 0.07
FRCPLETH LLN: 1.85
FRCPLETH PREREF: 92.6 %
FRCPLETH REF: 2.68
FRCPLETHULN: 3.5
FVC CHG: 2 %
FVC LLN: 2.01
FVC PRE REF: 67 %
FVC REF: 2.76
FVC VOL CHG: 0.04
IVC PRE: 1.57 L (ref 2.01–3.52)
IVC SINGLE BREATH LLN: 2.01
IVC SINGLE BREATH PRE REF: 56.9 %
IVC SINGLE BREATH REF: 2.76
IVCSINGLEBREATHULN: 3.52
LLN IC: -16448.1
MVV LLN: 68
MVV PRE REF: 39.8 %
MVV REF: 80
PEF LLN: 3.87
PEF PRE REF: 43.2 %
PEF REF: 5.55
POST FEF 25 75: 0.19 L/S (ref 0.84–2.8)
POST FET 100: 14.78 SEC
POST FEV1 FVC: 38.98 % (ref 64.74–91.51)
POST FEV1: 0.74 L (ref 1.56–2.73)
POST FEV5: 0.57 L (ref 0.82–2.53)
POST FVC: 1.89 L (ref 2.01–3.52)
POST PEF: 2.87 L/S (ref 3.87–7.23)
PRE DLCO: 10.29 ML/(MIN*MMHG) (ref 15.17–26.63)
PRE ERV: 0.37 L (ref -16449.36–16450.64)
PRE FEF 25 75: 0.15 L/S (ref 0.84–2.8)
PRE FET 100: 16.14 SEC
PRE FEV05 REF: 31.7 %
PRE FEV1 FVC: 35.93 % (ref 64.74–91.51)
PRE FEV1: 0.67 L (ref 1.56–2.73)
PRE FEV5: 0.53 L (ref 0.82–2.53)
PRE FRC PL: 2.48 L
PRE FVC: 1.85 L (ref 2.01–3.52)
PRE IC: 1.49 L (ref -16448.1–16451.9)
PRE MVV: 32 L/MIN (ref 68.32–92.44)
PRE PEF: 2.4 L/S (ref 3.87–7.23)
PRE REF IC: 78.4 %
PRE RV: 2.12 L (ref 1.46–2.61)
PRE TLC: 3.97 L (ref 3.85–5.82)
PRE VTG: 3.17 L
RAW PRE REF: 285.3 %
RAW PRE: 8.73 CMH2O*S/L (ref 3.06–3.06)
RAW REF: 3.06
REF IC: 1.9
RV LLN: 1.46
RV PRE REF: 104 %
RV REF: 2.03
RVTLC LLN: 33
RVTLC PRE REF: 124.7 %
RVTLC PRE: 53.31 % (ref 33.17–52.35)
RVTLC REF: 43
RVTLCULN: 52
RVULN: 2.61
SGAW PRE REF: 45.3 %
SGAW PRE: 0.05 1/(CMH2O*S) (ref 0.1–0.1)
SGAW REF: 0.1
TLC LLN: 3.85
TLC PRE REF: 82.1 %
TLC REF: 4.84
TLC ULN: 5.82
ULN IC: ABNORMAL
VA PRE: 2.53 L (ref 4.69–4.69)
VA SINGLE BREATH PRE REF: 54 %
VA SINGLE BREATH REF: 4.69
VC LLN: 2.01
VC PRE REF: 67 %
VC PRE: 1.85 L (ref 2.01–3.52)
VC REF: 2.76
VC ULN: 3.52

## 2022-06-10 PROCEDURE — 94640 AIRWAY INHALATION TREATMENT: CPT

## 2022-06-10 PROCEDURE — 94729 DIFFUSING CAPACITY: CPT

## 2022-06-10 PROCEDURE — 94060 EVALUATION OF WHEEZING: CPT

## 2022-06-10 PROCEDURE — 94761 N-INVAS EAR/PLS OXIMETRY MLT: CPT

## 2022-06-10 PROCEDURE — 94727 GAS DIL/WSHOT DETER LNG VOL: CPT

## 2022-06-10 PROCEDURE — 25000242 PHARM REV CODE 250 ALT 637 W/ HCPCS: Performed by: INTERNAL MEDICINE

## 2022-06-10 RX ORDER — ALBUTEROL SULFATE 0.83 MG/ML
2.5 SOLUTION RESPIRATORY (INHALATION)
Status: COMPLETED | OUTPATIENT
Start: 2022-06-10 | End: 2022-06-10

## 2022-06-10 RX ADMIN — ALBUTEROL SULFATE 2.5 MG: 2.5 SOLUTION RESPIRATORY (INHALATION) at 08:06

## 2022-06-13 ENCOUNTER — APPOINTMENT (OUTPATIENT)
Dept: LAB | Facility: HOSPITAL | Age: 72
End: 2022-06-13
Attending: INTERNAL MEDICINE
Payer: MEDICARE

## 2022-06-13 DIAGNOSIS — A31.0 PULMONARY DISEASE DUE TO MYCOBACTERIA: Primary | ICD-10-CM

## 2022-06-13 DIAGNOSIS — J47.9 BRONCHIECTASIS WITHOUT ACUTE EXACERBATION: ICD-10-CM

## 2022-06-13 PROCEDURE — 87206 SMEAR FLUORESCENT/ACID STAI: CPT

## 2022-06-13 PROCEDURE — 87102 FUNGUS ISOLATION CULTURE: CPT

## 2022-06-13 PROCEDURE — 87070 CULTURE OTHR SPECIMN AEROBIC: CPT

## 2022-06-14 ENCOUNTER — HOSPITAL ENCOUNTER (OUTPATIENT)
Dept: PULMONOLOGY | Facility: HOSPITAL | Age: 72
Discharge: HOME | End: 2022-06-14
Payer: MEDICARE

## 2022-06-14 DIAGNOSIS — J44.9 CHRONIC OBSTRUCTIVE PULMONARY DISEASE (COPD): ICD-10-CM

## 2022-06-14 LAB — M AVIUM PARATB TISS QL ZN STN: NORMAL

## 2022-06-14 PROCEDURE — 94625 PHY/QHP OP PULM RHB W/O MNTR: CPT

## 2022-06-14 NOTE — PROGRESS NOTES
"Session #: 4  Arrival time: 9:17   Departure time: 9:53  Total clinic time: 36:00    TIME WORKLOAD HR RPD BP O2 sat %   RESTING   78 5 126/70 98   ARM ERGO 6:00 NDIAYE: 35                 84 3  97   NUSTEP 6:00 NDIAYE: 30 88 7  95   TREADMILL 5:00 MPH/GRADE: 1.2/0 95 7  97   RECOVERY    5 126/76 97   EXERCISE TIME 17:00  81        OXYGEN: NC 3L/min  EXERCISE SUMMARY: Patient able to complete 17:00 of aerobic exercise on three machines. Pt reports high RPD scores,vital sign responses appropriate. Patient stable and asymptomatic at discharge.  PLAN FOR NEXT SESSION: Continue exercise prescription with goal of 38:00-45:00 total exercise time.  EDUCATION:  Use of RPE/RPD scales to self-monitor activity. Patient verbalized appropriate understanding, instructed to ask any questions during subsequent sessions.  NOTES: Pt had PFT last Friday; reported it "wiped her out all weekend."       "

## 2022-06-16 ENCOUNTER — HOSPITAL ENCOUNTER (OUTPATIENT)
Dept: PULMONOLOGY | Facility: HOSPITAL | Age: 72
Discharge: HOME | End: 2022-06-16
Payer: MEDICARE

## 2022-06-16 DIAGNOSIS — J44.9 CHRONIC OBSTRUCTIVE PULMONARY DISEASE (COPD): ICD-10-CM

## 2022-06-16 PROCEDURE — 94625 PHY/QHP OP PULM RHB W/O MNTR: CPT

## 2022-06-16 NOTE — PROGRESS NOTES
Session #: 5  Arrival time:9:20   Departure time:9:58  Total clinic time:38:00   TIME WORKLOAD HR RPD BP O2 sat %   RESTING   70 5 128/70 95   ARM ERGO 7:00 NDIAYE: 35                 80 4  98   NUSTEP 7:00 NDIAYE:35 92 6  97   TREADMILL 5:00 MPH/GRADE: 1.2/0 88 6  96   RECOVERY   74 4 128/66 97   EXERCISE TIME 19:00          OXYGEN: NC 3L/min  EXERCISE SUMMARY: Patient able to complete 19:00 of aerobic exercise on three machines. No signs/symptoms noted or reported, vital sign responses appropriate. Patient stable and asymptomatic at discharge.  PLAN FOR NEXT SESSION: Continue exercise prescription with goal of 38:00-45:00 total exercise time.  EDUCATION:  Preventing lung ittitation and infection. Patient verbalized appropriate understanding, instructed to ask any questions during subsequent sessions.

## 2022-06-21 ENCOUNTER — HOSPITAL ENCOUNTER (OUTPATIENT)
Dept: PULMONOLOGY | Facility: HOSPITAL | Age: 72
Discharge: HOME | End: 2022-06-21
Payer: MEDICARE

## 2022-06-21 DIAGNOSIS — J44.9 CHRONIC OBSTRUCTIVE PULMONARY DISEASE (COPD): ICD-10-CM

## 2022-06-21 PROCEDURE — 94625 PHY/QHP OP PULM RHB W/O MNTR: CPT

## 2022-06-21 NOTE — PROGRESS NOTES
Session #: 6  Arrival time: 9:13   Departure time: 10:01  Total clinic time: 48:00   TIME WORKLOAD HR RPD BP O2 sat %   RESTING   79 4 132/70 97   ARM ERGO 7:00 NDIAYE: 35                 84 5  98   NUSTEP 7:00 NDIAYE: 35 88 6  96   TREADMILL 5:00 MPH/GRADE: 1.2/0 93 6  97   RECOVERY   82 4 122/70 97   EXERCISE TIME 19:00          OXYGEN: NC 3L/min  EXERCISE SUMMARY: Patient able to complete 19:00 of aerobic exercise on three machines. No signs/symptoms noted or reported, vital sign responses appropriate. Patient stable and asymptomatic at discharge.  PLAN FOR NEXT SESSION: Continue exercise prescription with goal of 38:00-45:00 total exercise time.  EDUCATION:  Nutrition and lung disease. Patient verbalized appropriate understanding, instructed to ask any questions during subsequent sessions.

## 2022-06-27 LAB
BACTERIA SPEC CULT: ABNORMAL
BACTERIA SPEC CULT: ABNORMAL
GRAM STN SPEC: ABNORMAL

## 2022-07-05 ENCOUNTER — HOSPITAL ENCOUNTER (OUTPATIENT)
Dept: PULMONOLOGY | Facility: HOSPITAL | Age: 72
Discharge: HOME | End: 2022-07-05
Payer: MEDICARE

## 2022-07-05 DIAGNOSIS — J44.9 CHRONIC OBSTRUCTIVE PULMONARY DISEASE (COPD): ICD-10-CM

## 2022-07-05 PROCEDURE — 94625 PHY/QHP OP PULM RHB W/O MNTR: CPT

## 2022-07-05 NOTE — PROGRESS NOTES
Session #: 6  Arrival time: 9:20   Departure time: 10:05  Total clinic time: 45:00   TIME WORKLOAD HR RPD BP O2 sat %   RESTING   84 2 128/74 97   ARM ERGO 7 NDIAYE:35                  90 2  99   NUSTEP 12 NDIAYE:35 91 3  97   TREADMILL 5 MPH/GRADE: 1.2/0 95 3  97   RECOVERY   90 2 132/68 98   EXERCISE TIME 24          OXYGEN: NC 3L/min  EXERCISE SUMMARY: Patient able to complete 24:00 of aerobic exercise on three machines. No signs/symptoms noted or reported, vital sign responses appropriate. Patient stable and asymptomatic at discharge.  PLAN FOR NEXT SESSION: Continue exercise prescription with goal of 38:00-45:00 total exercise time.  EDUCATION:  Pulmonary medications. Patient verbalized appropriate understanding, instructed to ask any questions during subsequent sessions.  NOTES: First session since 6/21/22

## 2022-07-07 ENCOUNTER — HOSPITAL ENCOUNTER (OUTPATIENT)
Dept: PULMONOLOGY | Facility: HOSPITAL | Age: 72
Discharge: HOME | End: 2022-07-07
Payer: MEDICARE

## 2022-07-07 DIAGNOSIS — J44.9 CHRONIC OBSTRUCTIVE PULMONARY DISEASE (COPD): ICD-10-CM

## 2022-07-07 PROCEDURE — 94625 PHY/QHP OP PULM RHB W/O MNTR: CPT

## 2022-07-07 NOTE — PROGRESS NOTES
"Session #: 8  Arrival time: 9:20   Departure time: 10:07  Total clinic time: 47:00   TIME WORKLOAD HR RPD BP O2 sat %   RESTING   70 2 138/62 97   ARM ERGO 8 NDIAYE:  35                74 3  98   NUSTEP 13 NDIAYE:35 94 3  97   TREADMILL 5 MPH/GRADE: 1.2/0 83 3  96   RECOVERY   76 3 134/64 97   EXERCISE TIME 26          OXYGEN: NC 3L/min  EXERCISE SUMMARY: Patient able to complete 26:00 of aerobic exercise on three machines. No signs/symptoms noted or reported, vital sign responses appropriate. Patient stable and asymptomatic at discharge.  PLAN FOR NEXT SESSION: Continue exercise prescription with goal of 38:00-45:00 total exercise time.  EDUCATION:  "Supplemental Oxygen." Patient verbalized appropriate understanding, instructed to ask any questions during subsequent sessions.    "

## 2022-07-11 LAB
FUNGUS SPEC CULT: ABNORMAL

## 2022-07-12 ENCOUNTER — HOSPITAL ENCOUNTER (OUTPATIENT)
Dept: PULMONOLOGY | Facility: HOSPITAL | Age: 72
Discharge: HOME | End: 2022-07-12
Payer: MEDICARE

## 2022-07-12 DIAGNOSIS — J44.9 CHRONIC OBSTRUCTIVE PULMONARY DISEASE (COPD): ICD-10-CM

## 2022-07-12 PROCEDURE — 94625 PHY/QHP OP PULM RHB W/O MNTR: CPT

## 2022-07-12 NOTE — PROGRESS NOTES
"Session #: 9  Arrival time:10:20   Departure time: 11:05  Total clinic time: 45:00   TIME WORKLOAD HR RPD BP O2 sat %   RESTING   93 3 158/70 98   ARM ERGO 9 NDIAYE:  35                108 2  97   NUSTEP 14 NDIAYE:35 115 3  96   TREADMILL 5 MPH/GRADE: 1.2/0 113 4  96   RECOVERY   98 3 146/66 98   EXERCISE TIME 28          OXYGEN: NC 3L/min  EXERCISE SUMMARY: Patient able to complete 28:00 of aerobic exercise on three machines. No signs/symptoms noted or reported, vital sign responses appropriate. Patient stable and asymptomatic at discharge.  PLAN FOR NEXT SESSION: Continue exercise prescription with goal of 38:00-45:00 total exercise time.  EDUCATION:  "Breath Control Techniques." Patient verbalized appropriate understanding, instructed to ask any questions during subsequent sessions.    "

## 2022-07-19 ENCOUNTER — HOSPITAL ENCOUNTER (OUTPATIENT)
Dept: PULMONOLOGY | Facility: HOSPITAL | Age: 72
Discharge: HOME | End: 2022-07-19
Payer: MEDICARE

## 2022-07-19 DIAGNOSIS — J44.9 CHRONIC OBSTRUCTIVE PULMONARY DISEASE (COPD): ICD-10-CM

## 2022-07-19 PROCEDURE — 94625 PHY/QHP OP PULM RHB W/O MNTR: CPT

## 2022-07-19 NOTE — PROGRESS NOTES
"Session #:  Arrival time:   Departure time:  Total clinic time:   TIME WORKLOAD HR RPD BP O2 sat %   RESTING   88 6 158/76 95   ARM ERGO 9 NDIAYE:  40                102 6  98   NUSTEP 15 NDIAYE:40 98 7  96   TREADMILL 5 MPH/GRADE: 1.2/0 100 8  95   RECOVERY   90 6 158/80 96   EXERCISE TIME 29          OXYGEN: NC 3L/min  EXERCISE SUMMARY: Patient able to complete 29:00 of aerobic exercise on three machines. No signs/symptoms noted or reported, vital sign responses appropriate. Patient stable and asymptomatic at discharge.  PLAN FOR NEXT SESSION: Continue exercise prescription with goal of 38:00-45:00 total exercise time.  EDUCATION:  "Breath Control." Patient verbalized appropriate understanding, instructed to ask any questions during subsequent sessions.         "

## 2022-07-25 ENCOUNTER — APPOINTMENT (OUTPATIENT)
Dept: LAB | Facility: HOSPITAL | Age: 72
End: 2022-07-25
Attending: INTERNAL MEDICINE
Payer: MEDICARE

## 2022-07-25 DIAGNOSIS — A31.0 PULMONARY DISEASE DUE TO MYCOBACTERIA: ICD-10-CM

## 2022-07-25 DIAGNOSIS — J47.9 BRONCHIECTASIS WITHOUT ACUTE EXACERBATION: Primary | ICD-10-CM

## 2022-07-25 PROCEDURE — 87206 SMEAR FLUORESCENT/ACID STAI: CPT

## 2022-07-25 PROCEDURE — 87077 CULTURE AEROBIC IDENTIFY: CPT | Performed by: INTERNAL MEDICINE

## 2022-07-25 PROCEDURE — 87070 CULTURE OTHR SPECIMN AEROBIC: CPT

## 2022-07-26 LAB — M AVIUM PARATB TISS QL ZN STN: NORMAL

## 2022-07-27 LAB — BACTERIA SPEC CULT: NORMAL

## 2022-07-28 ENCOUNTER — HOSPITAL ENCOUNTER (OUTPATIENT)
Dept: PULMONOLOGY | Facility: HOSPITAL | Age: 72
Discharge: HOME | End: 2022-07-28
Payer: MEDICARE

## 2022-07-28 DIAGNOSIS — J44.9 CHRONIC OBSTRUCTIVE PULMONARY DISEASE (COPD): ICD-10-CM

## 2022-07-28 PROCEDURE — 94625 PHY/QHP OP PULM RHB W/O MNTR: CPT

## 2022-08-04 ENCOUNTER — HOSPITAL ENCOUNTER (OUTPATIENT)
Dept: PULMONOLOGY | Facility: HOSPITAL | Age: 72
Discharge: HOME | End: 2022-08-04
Payer: MEDICARE

## 2022-08-04 DIAGNOSIS — J44.9 CHRONIC OBSTRUCTIVE PULMONARY DISEASE (COPD): ICD-10-CM

## 2022-08-04 PROBLEM — G47.33 OBSTRUCTIVE SLEEP APNEA: Status: ACTIVE | Noted: 2022-08-04

## 2022-08-04 PROCEDURE — 94625 PHY/QHP OP PULM RHB W/O MNTR: CPT

## 2022-08-04 NOTE — PROGRESS NOTES
"Session #: 12  Arrival time:10:25                              Departure time:11:07                        Total clinic time:42:00    TIME WORKLOAD HR RPD BP O2 sat %   RESTING     86 6 156/72 99   ARM ERGO 10 NDIAYE: 40                 92 7   98   NUSTEP 20 NDIAYE:40 98 8   97   RECOVERY     90 6 160/74 98   EXERCISE TIME 30                OXYGEN: NC 3L/min  EXERCISE SUMMARY: Patient able to complete 30:00 of aerobic exercise on two machines. No signs/symptoms noted or reported, vital sign responses appropriate. Patient stable and asymptomatic at discharge.  PLAN FOR NEXT SESSION: Continue exercise prescription with goal of 38:00-45:00 total exercise time.  EDUCATION:  "Exercise Guidelines." Patient verbalized appropriate understanding, instructed to ask any questions during subsequent sessions.             "

## 2022-08-12 ENCOUNTER — HOSPITAL ENCOUNTER (OUTPATIENT)
Dept: RADIOLOGY | Facility: HOSPITAL | Age: 72
Discharge: HOME | End: 2022-08-12
Payer: MEDICARE

## 2022-08-12 DIAGNOSIS — Z86.19 HISTORY OF MYCOBACTERIUM AVIUM COMPLEX INFECTION: ICD-10-CM

## 2022-08-12 DIAGNOSIS — J44.1 COPD EXACERBATION: ICD-10-CM

## 2022-08-12 PROCEDURE — 71046 X-RAY EXAM CHEST 2 VIEWS: CPT | Mod: TC

## 2022-08-22 LAB
FUNGUS SPEC CULT: ABNORMAL
FUNGUS SPEC CULT: ABNORMAL

## 2022-10-24 ENCOUNTER — APPOINTMENT (OUTPATIENT)
Dept: LAB | Facility: HOSPITAL | Age: 72
End: 2022-10-24
Payer: MEDICARE

## 2022-11-28 ENCOUNTER — HOSPITAL ENCOUNTER (EMERGENCY)
Facility: HOSPITAL | Age: 72
Discharge: LEFT BEFORE DISCHARGE | End: 2022-11-28
Payer: MEDICARE

## 2022-11-28 VITALS
RESPIRATION RATE: 24 BRPM | HEART RATE: 98 BPM | OXYGEN SATURATION: 92 % | WEIGHT: 145 LBS | DIASTOLIC BLOOD PRESSURE: 74 MMHG | BODY MASS INDEX: 25.69 KG/M2 | SYSTOLIC BLOOD PRESSURE: 149 MMHG | TEMPERATURE: 98 F | HEIGHT: 63 IN

## 2022-11-28 LAB
ALBUMIN SERPL-MCNC: 3.2 GM/DL (ref 3.4–4.8)
ALBUMIN/GLOB SERPL: 1.1 RATIO (ref 1.1–2)
ALP SERPL-CCNC: 73 UNIT/L (ref 40–150)
ALT SERPL-CCNC: 13 UNIT/L (ref 0–55)
APPEARANCE UR: CLEAR
AST SERPL-CCNC: 22 UNIT/L (ref 5–34)
BACTERIA #/AREA URNS AUTO: ABNORMAL /HPF
BASOPHILS # BLD AUTO: 0.03 X10(3)/MCL (ref 0–0.2)
BASOPHILS NFR BLD AUTO: 0.5 %
BILIRUB UR QL STRIP.AUTO: NEGATIVE MG/DL
BILIRUBIN DIRECT+TOT PNL SERPL-MCNC: 0.8 MG/DL
BUN SERPL-MCNC: 6 MG/DL (ref 9.8–20.1)
CALCIUM SERPL-MCNC: 9.5 MG/DL (ref 8.4–10.2)
CHLORIDE SERPL-SCNC: 102 MMOL/L (ref 98–107)
CO2 SERPL-SCNC: 26 MMOL/L (ref 23–31)
COLOR UR AUTO: ABNORMAL
CREAT SERPL-MCNC: 0.89 MG/DL (ref 0.55–1.02)
EOSINOPHIL # BLD AUTO: 0.34 X10(3)/MCL (ref 0–0.9)
EOSINOPHIL NFR BLD AUTO: 5.1 %
ERYTHROCYTE [DISTWIDTH] IN BLOOD BY AUTOMATED COUNT: 13.8 % (ref 11.5–17)
GFR SERPLBLD CREATININE-BSD FMLA CKD-EPI: >60 MLS/MIN/1.73/M2
GLOBULIN SER-MCNC: 3 GM/DL (ref 2.4–3.5)
GLUCOSE SERPL-MCNC: 83 MG/DL (ref 82–115)
GLUCOSE UR QL STRIP.AUTO: NEGATIVE MG/DL
HCT VFR BLD AUTO: 34.5 % (ref 37–47)
HGB BLD-MCNC: 11 GM/DL (ref 12–16)
IMM GRANULOCYTES # BLD AUTO: 0.02 X10(3)/MCL (ref 0–0.04)
IMM GRANULOCYTES NFR BLD AUTO: 0.3 %
KETONES UR QL STRIP.AUTO: ABNORMAL MG/DL
LEUKOCYTE ESTERASE UR QL STRIP.AUTO: NEGATIVE UNIT/L
LIPASE SERPL-CCNC: 19 U/L
LYMPHOCYTES # BLD AUTO: 0.94 X10(3)/MCL (ref 0.6–4.6)
LYMPHOCYTES NFR BLD AUTO: 14.1 %
MCH RBC QN AUTO: 27.7 PG (ref 27–31)
MCHC RBC AUTO-ENTMCNC: 31.9 MG/DL (ref 33–36)
MCV RBC AUTO: 86.9 FL (ref 80–94)
MONOCYTES # BLD AUTO: 0.96 X10(3)/MCL (ref 0.1–1.3)
MONOCYTES NFR BLD AUTO: 14.4 %
NEUTROPHILS # BLD AUTO: 4.4 X10(3)/MCL (ref 2.1–9.2)
NEUTROPHILS NFR BLD AUTO: 65.6 %
NITRITE UR QL STRIP.AUTO: NEGATIVE
NRBC BLD AUTO-RTO: 0 %
PH UR STRIP.AUTO: 6 [PH]
PLATELET # BLD AUTO: 502 X10(3)/MCL (ref 130–400)
PMV BLD AUTO: 8.6 FL (ref 7.4–10.4)
POTASSIUM SERPL-SCNC: 3 MMOL/L (ref 3.5–5.1)
PROT SERPL-MCNC: 6.2 GM/DL (ref 5.8–7.6)
PROT UR QL STRIP.AUTO: ABNORMAL MG/DL
RBC # BLD AUTO: 3.97 X10(6)/MCL (ref 4.2–5.4)
RBC #/AREA URNS AUTO: <5 /HPF
RBC UR QL AUTO: NEGATIVE UNIT/L
SODIUM SERPL-SCNC: 139 MMOL/L (ref 136–145)
SP GR UR STRIP.AUTO: 1.02 (ref 1–1.03)
SQUAMOUS #/AREA URNS AUTO: 9 /HPF
UROBILINOGEN UR STRIP-ACNC: 1 MG/DL
WBC # SPEC AUTO: 6.7 X10(3)/MCL (ref 4.5–11.5)
WBC #/AREA URNS AUTO: 6 /HPF

## 2022-11-28 PROCEDURE — 99283 EMERGENCY DEPT VISIT LOW MDM: CPT | Mod: 25

## 2022-11-28 PROCEDURE — 81001 URINALYSIS AUTO W/SCOPE: CPT | Performed by: STUDENT IN AN ORGANIZED HEALTH CARE EDUCATION/TRAINING PROGRAM

## 2022-11-28 PROCEDURE — 85025 COMPLETE CBC W/AUTO DIFF WBC: CPT | Performed by: STUDENT IN AN ORGANIZED HEALTH CARE EDUCATION/TRAINING PROGRAM

## 2022-11-28 PROCEDURE — 80053 COMPREHEN METABOLIC PANEL: CPT | Performed by: STUDENT IN AN ORGANIZED HEALTH CARE EDUCATION/TRAINING PROGRAM

## 2022-11-28 PROCEDURE — 83690 ASSAY OF LIPASE: CPT | Performed by: STUDENT IN AN ORGANIZED HEALTH CARE EDUCATION/TRAINING PROGRAM

## 2022-11-28 NOTE — FIRST PROVIDER EVALUATION
"Medical screening examination initiated.  I have conducted a focused provider triage encounter, findings are as follows:    Chief Complaint   Patient presents with    Diarrhea     C/o diarrhea, abdominal pain, and back pain for "awhile" . Spoke with Dr. Molly BOSTON and Rx abx. Denies fever, cough, and congestion. PRN home oxygen     Brief history of present illness:  72 y.o. female presents to the ED with abdominal pain radiating to her back with diarrhea for "awhile." States she was started on abx by GI with nor releif. Denies cough, congestion, fever, chills.     Vitals:    11/28/22 1032   BP: 113/64   Pulse: 83   Resp: 20   Temp: 97.7 °F (36.5 °C)   SpO2: (!) 94%   Weight: 65.8 kg (145 lb)   Height: 5' 3" (1.6 m)       Pertinent physical exam:  Awake, alert, ambulatory, non-labored respirations    Brief workup plan:  labs, UA, stool cultures    Preliminary workup initiated; this workup will be continued and followed by the physician or advanced practice provider that is assigned to the patient when roomed.  "

## 2022-12-09 ENCOUNTER — LAB VISIT (OUTPATIENT)
Dept: LAB | Facility: HOSPITAL | Age: 72
End: 2022-12-09
Attending: NURSE PRACTITIONER
Payer: MEDICARE

## 2022-12-09 DIAGNOSIS — E87.8 DILATED CARDIOMYOPATHY SECONDARY TO ELECTROLYTE DEFICIENCY: Primary | ICD-10-CM

## 2022-12-09 DIAGNOSIS — R10.84 ABDOMINAL PAIN, GENERALIZED: ICD-10-CM

## 2022-12-09 DIAGNOSIS — R19.4 FREQUENT BOWEL MOVEMENTS: ICD-10-CM

## 2022-12-09 DIAGNOSIS — I43 DILATED CARDIOMYOPATHY SECONDARY TO ELECTROLYTE DEFICIENCY: Primary | ICD-10-CM

## 2022-12-09 DIAGNOSIS — D64.9 ANEMIA, UNSPECIFIED TYPE: ICD-10-CM

## 2022-12-09 LAB
COLOR STL: NORMAL
CONSISTENCY STL: NORMAL
FERRITIN SERPL-MCNC: 36.92 NG/ML (ref 4.63–204)
FOLATE SERPL-MCNC: 12.3 NG/ML (ref 7–31.4)
HEMOCCULT SP2 STL QL: NEGATIVE
IRON SATN MFR SERPL: 16 % (ref 20–50)
IRON SERPL-MCNC: 46 UG/DL (ref 50–170)
TIBC SERPL-MCNC: 242 UG/DL (ref 70–310)
TIBC SERPL-MCNC: 288 UG/DL (ref 250–450)

## 2022-12-09 PROCEDURE — 82746 ASSAY OF FOLIC ACID SERUM: CPT

## 2022-12-09 PROCEDURE — 36415 COLL VENOUS BLD VENIPUNCTURE: CPT

## 2022-12-09 PROCEDURE — 82272 OCCULT BLD FECES 1-3 TESTS: CPT

## 2022-12-09 PROCEDURE — 83540 ASSAY OF IRON: CPT

## 2022-12-09 PROCEDURE — 82728 ASSAY OF FERRITIN: CPT

## 2022-12-13 PROCEDURE — 82270 OCCULT BLOOD FECES: CPT | Performed by: INTERNAL MEDICINE

## 2022-12-14 ENCOUNTER — LAB REQUISITION (OUTPATIENT)
Dept: LAB | Facility: HOSPITAL | Age: 72
End: 2022-12-14
Payer: MEDICARE

## 2022-12-14 DIAGNOSIS — E87.6 HYPOKALEMIA: ICD-10-CM

## 2022-12-14 DIAGNOSIS — R19.4 CHANGE IN BOWEL HABIT: ICD-10-CM

## 2022-12-14 DIAGNOSIS — D64.9 ANEMIA, UNSPECIFIED: ICD-10-CM

## 2022-12-14 DIAGNOSIS — R10.84 GENERALIZED ABDOMINAL PAIN: ICD-10-CM

## 2022-12-14 LAB
HEMOCCULT SP1 STL QL: NEGATIVE
HEMOCCULT SP2 STL QL: NEGATIVE

## 2022-12-27 PROBLEM — Z23 IMMUNIZATION DUE: Status: ACTIVE | Noted: 2022-12-27

## 2022-12-27 PROBLEM — I10 PRIMARY HYPERTENSION: Status: ACTIVE | Noted: 2022-12-27

## 2022-12-27 PROBLEM — M25.50 ARTHRALGIA: Status: ACTIVE | Noted: 2022-12-27

## 2022-12-27 PROBLEM — E87.6 HYPOKALEMIA: Status: ACTIVE | Noted: 2022-12-27

## 2022-12-27 PROBLEM — F32.A DEPRESSIVE DISORDER: Status: ACTIVE | Noted: 2022-12-27

## 2023-01-24 ENCOUNTER — APPOINTMENT (OUTPATIENT)
Dept: LAB | Facility: HOSPITAL | Age: 73
End: 2023-01-24
Payer: MEDICARE

## 2023-01-24 DIAGNOSIS — A31.0 MYCOBACTERIUM AVIUM COMPLEX: ICD-10-CM

## 2023-01-24 PROCEDURE — 87116 MYCOBACTERIA CULTURE: CPT

## 2023-02-02 RX ORDER — TRAMADOL HYDROCHLORIDE 50 MG/1
50 TABLET ORAL EVERY 8 HOURS PRN
COMMUNITY
End: 2023-07-11

## 2023-02-05 ENCOUNTER — ANESTHESIA EVENT (OUTPATIENT)
Dept: ENDOSCOPY | Facility: HOSPITAL | Age: 73
End: 2023-02-05
Payer: MEDICARE

## 2023-02-05 RX ORDER — IPRATROPIUM BROMIDE AND ALBUTEROL SULFATE 2.5; .5 MG/3ML; MG/3ML
3 SOLUTION RESPIRATORY (INHALATION)
Status: CANCELLED | OUTPATIENT
Start: 2023-02-05

## 2023-02-05 RX ORDER — PROCHLORPERAZINE EDISYLATE 5 MG/ML
5 INJECTION INTRAMUSCULAR; INTRAVENOUS EVERY 30 MIN PRN
Status: CANCELLED | OUTPATIENT
Start: 2023-02-05

## 2023-02-05 RX ORDER — MEPERIDINE HYDROCHLORIDE 25 MG/ML
12.5 INJECTION INTRAMUSCULAR; INTRAVENOUS; SUBCUTANEOUS EVERY 10 MIN PRN
Status: CANCELLED | OUTPATIENT
Start: 2023-02-05 | End: 2023-02-06

## 2023-02-05 RX ORDER — ONDANSETRON 2 MG/ML
4 INJECTION INTRAMUSCULAR; INTRAVENOUS DAILY PRN
Status: CANCELLED | OUTPATIENT
Start: 2023-02-05

## 2023-02-05 NOTE — ANESTHESIA PREPROCEDURE EVALUATION
02/05/2023  Alesia Walden is a 72 y.o., female with history of CAD status post stent (November 2017) and hypoxic respiratory failure secondary to COPD, sarcoidosis and MAC on home O2 presents as an outpatient for colonoscopy (abdominal pain with iron-deficiency anemia).    Last 3 sets of Vitals    Vitals - 1 value per visit 2/3/2023 2/6/2023 2/6/2023   SYSTOLIC - 161 -   DIASTOLIC - 75 -   Pulse - 95 95   Temp - - -   Resp - 24 24   SPO2 - 93 -   Weight (lb) 140 134 -   Weight (kg) 63.504 60.782 -   Height 63 63 -   BMI (Calculated) 24.8 23.7 -   VISIT REPORT - - -   Pain Score  - - -         Lab Results   Component Value Date    WBC 6.7 11/28/2022    HGB 11.0 (L) 11/28/2022    HCT 34.5 (L) 11/28/2022    MCV 86.9 11/28/2022     (H) 11/28/2022          BMP  Lab Results   Component Value Date     12/27/2022    K 3.9 12/27/2022    CO2 29 12/27/2022    BUN 11.0 12/27/2022    CREATININE 1.34 (H) 12/27/2022    CALCIUM 10.1 12/27/2022      Pre-op Assessment    I have reviewed the Patient Summary Reports.    I have reviewed the NPO Status.   I have reviewed the Medications.     Review of Systems  Anesthesia Hx:   Denies Personal Hx of Anesthesia complications.   Social:  Non-Smoker    Cardiovascular:   Hypertension  Functional Capacity 2 METS  Coronary Artery Disease: S/P Percutaneous Coronary Intervention (PCI) coronary stent, unknown type  Valvular Heart Disease: Aortic Regurgitation (AR), moderate     Pulmonary:   COPD, severe Shortness of breath Sleep Apnea, CPAP    Hepatic/GI:   GERD        Physical Exam  General: Well nourished, Cooperative, Alert and Oriented    Airway:  Mallampati: II   Mouth Opening: Normal  TM Distance: Normal  Tongue: Normal  Neck ROM: Normal ROM    Dental:  Intact    Chest/Lungs:  Clear to auscultation, Normal Respiratory Rate  On 2 L nasal cannula  Heart:  Rate:  Normal  Rhythm: Regular Rhythm        Anesthesia Plan  Type of Anesthesia, risks & benefits discussed:    Anesthesia Type: Gen Natural Airway  Intra-op Monitoring Plan: Standard ASA Monitors  Induction:  IV  Informed Consent: Informed consent signed with the Patient and all parties understand the risks and agree with anesthesia plan.  All questions answered.   ASA Score: 3  Day of Surgery Review of History & Physical: H&P Update referred to the surgeon/provider.  Anesthesia Plan Notes: POM mask     DuoNeb treatment preop    Ready For Surgery From Anesthesia Perspective.     .

## 2023-02-06 ENCOUNTER — HOSPITAL ENCOUNTER (OUTPATIENT)
Facility: HOSPITAL | Age: 73
Discharge: HOME | End: 2023-02-06
Attending: INTERNAL MEDICINE | Admitting: INTERNAL MEDICINE
Payer: MEDICARE

## 2023-02-06 ENCOUNTER — ANESTHESIA (OUTPATIENT)
Dept: ENDOSCOPY | Facility: HOSPITAL | Age: 73
End: 2023-02-06
Payer: MEDICARE

## 2023-02-06 VITALS
RESPIRATION RATE: 24 BRPM | WEIGHT: 134 LBS | DIASTOLIC BLOOD PRESSURE: 79 MMHG | SYSTOLIC BLOOD PRESSURE: 139 MMHG | BODY MASS INDEX: 23.74 KG/M2 | HEIGHT: 63 IN | OXYGEN SATURATION: 96 % | HEART RATE: 91 BPM

## 2023-02-06 DIAGNOSIS — K59.00 CONSTIPATION, UNSPECIFIED CONSTIPATION TYPE: ICD-10-CM

## 2023-02-06 DIAGNOSIS — R10.32 ABDOMINAL PAIN, LEFT LOWER QUADRANT: ICD-10-CM

## 2023-02-06 DIAGNOSIS — D50.9 IRON DEFICIENCY ANEMIA, UNSPECIFIED IRON DEFICIENCY ANEMIA TYPE: ICD-10-CM

## 2023-02-06 DIAGNOSIS — R10.31 RLQ ABDOMINAL PAIN: ICD-10-CM

## 2023-02-06 PROCEDURE — 25000003 PHARM REV CODE 250: Performed by: NURSE ANESTHETIST, CERTIFIED REGISTERED

## 2023-02-06 PROCEDURE — 88305 TISSUE EXAM BY PATHOLOGIST: CPT

## 2023-02-06 PROCEDURE — 25000003 PHARM REV CODE 250: Performed by: INTERNAL MEDICINE

## 2023-02-06 PROCEDURE — 27201423 OPTIME MED/SURG SUP & DEVICES STERILE SUPPLY: Performed by: INTERNAL MEDICINE

## 2023-02-06 PROCEDURE — 37000008 HC ANESTHESIA 1ST 15 MINUTES: Performed by: INTERNAL MEDICINE

## 2023-02-06 PROCEDURE — 37000009 HC ANESTHESIA EA ADD 15 MINS: Performed by: INTERNAL MEDICINE

## 2023-02-06 PROCEDURE — 30000890 HC MISC. SEND OUT TEST: Performed by: INTERNAL MEDICINE

## 2023-02-06 PROCEDURE — 45385 COLONOSCOPY W/LESION REMOVAL: CPT | Performed by: INTERNAL MEDICINE

## 2023-02-06 PROCEDURE — 25000242 PHARM REV CODE 250 ALT 637 W/ HCPCS

## 2023-02-06 PROCEDURE — 88311 DECALCIFY TISSUE: CPT | Performed by: INTERNAL MEDICINE

## 2023-02-06 RX ORDER — SODIUM CHLORIDE 9 MG/ML
INJECTION, SOLUTION INTRAVENOUS CONTINUOUS
Status: DISCONTINUED | OUTPATIENT
Start: 2023-02-06 | End: 2023-02-06 | Stop reason: HOSPADM

## 2023-02-06 RX ORDER — LIDOCAINE HCL/PF 100 MG/5ML
SYRINGE (ML) INTRAVENOUS
Status: DISCONTINUED | OUTPATIENT
Start: 2023-02-06 | End: 2023-02-06

## 2023-02-06 RX ORDER — IPRATROPIUM BROMIDE AND ALBUTEROL SULFATE 2.5; .5 MG/3ML; MG/3ML
3 SOLUTION RESPIRATORY (INHALATION) ONCE
Status: COMPLETED | OUTPATIENT
Start: 2023-02-06 | End: 2023-02-06

## 2023-02-06 RX ORDER — PHENYLEPHRINE HCL IN 0.9% NACL 1 MG/10 ML
SYRINGE (ML) INTRAVENOUS
Status: DISCONTINUED
Start: 2023-02-06 | End: 2023-02-06 | Stop reason: HOSPADM

## 2023-02-06 RX ORDER — IPRATROPIUM BROMIDE AND ALBUTEROL SULFATE 2.5; .5 MG/3ML; MG/3ML
SOLUTION RESPIRATORY (INHALATION)
Status: COMPLETED
Start: 2023-02-06 | End: 2023-02-06

## 2023-02-06 RX ORDER — LIDOCAINE HYDROCHLORIDE 20 MG/ML
INJECTION, SOLUTION EPIDURAL; INFILTRATION; INTRACAUDAL; PERINEURAL
Status: DISCONTINUED
Start: 2023-02-06 | End: 2023-02-06 | Stop reason: HOSPADM

## 2023-02-06 RX ORDER — ONDANSETRON 4 MG/1
8 TABLET, ORALLY DISINTEGRATING ORAL EVERY 6 HOURS PRN
Status: DISCONTINUED | OUTPATIENT
Start: 2023-02-06 | End: 2023-02-06 | Stop reason: HOSPADM

## 2023-02-06 RX ORDER — PROPOFOL 10 MG/ML
VIAL (ML) INTRAVENOUS
Status: COMPLETED
Start: 2023-02-06 | End: 2023-02-06

## 2023-02-06 RX ORDER — PROPOFOL 10 MG/ML
INJECTION, EMULSION INTRAVENOUS CONTINUOUS PRN
Status: DISCONTINUED | OUTPATIENT
Start: 2023-02-06 | End: 2023-02-06

## 2023-02-06 RX ORDER — SODIUM CHLORIDE, SODIUM GLUCONATE, SODIUM ACETATE, POTASSIUM CHLORIDE AND MAGNESIUM CHLORIDE 30; 37; 368; 526; 502 MG/100ML; MG/100ML; MG/100ML; MG/100ML; MG/100ML
INJECTION, SOLUTION INTRAVENOUS CONTINUOUS
Status: DISCONTINUED | OUTPATIENT
Start: 2023-02-06 | End: 2023-02-06 | Stop reason: HOSPADM

## 2023-02-06 RX ORDER — LIDOCAINE HYDROCHLORIDE 10 MG/ML
1 INJECTION, SOLUTION EPIDURAL; INFILTRATION; INTRACAUDAL; PERINEURAL ONCE
Status: DISCONTINUED | OUTPATIENT
Start: 2023-02-06 | End: 2023-02-06 | Stop reason: HOSPADM

## 2023-02-06 RX ADMIN — IPRATROPIUM BROMIDE AND ALBUTEROL SULFATE 3 ML: 2.5; .5 SOLUTION RESPIRATORY (INHALATION) at 06:02

## 2023-02-06 RX ADMIN — LIDOCAINE HYDROCHLORIDE 40 MG: 20 INJECTION, SOLUTION INTRAVENOUS at 07:02

## 2023-02-06 RX ADMIN — PROPOFOL 200 MCG/KG/MIN: 10 INJECTION, EMULSION INTRAVENOUS at 07:02

## 2023-02-06 RX ADMIN — SODIUM CHLORIDE: 9 INJECTION, SOLUTION INTRAVENOUS at 06:02

## 2023-02-06 NOTE — H&P
Endoscopy History and Physical    PCP - Chuck Estrella MD    Procedure - Colonoscopy  Sedation: MAC  ASA - per anesthesia  Mallampati - per anesthesia  History of Anesthesia problems - no  Family history Anesthesia problems -  no     HPI:  This is a 72 y.o. female here for evaluation of iron deficiency anemia, LLQ abdominal pain, constipation    Reflux - no  Dysphagia - no  Abdominal pain - no  Diarrhea - no    ROS:  Constitutional: No fevers, chills, No weight loss  ENT: No allergies  CV: No chest pain  Pulm: No cough, No shortness of breath  Ophtho: No vision changes  GI: see HPI  Medical History:  has a past medical history of Abdominal pain, LLQ, Abdominal pain, RLQ, Acute bilateral low back pain with left-sided sciatica, Anxiety, Apnea, sleep, Cardiac microvascular disease, Carotid bruit, Constipation, unspecified, COPD (chronic obstructive pulmonary disease), Depressive disorder, Diverticulitis, Dyslipidemia, Essential hypertension, Fungal infection of lung, GERD (gastroesophageal reflux disease), chronic, Heart valve disorder, Iron deficiency anemia, Moderate aortic regurgitation, Primary hypertension, PTCA with stent insertion, Shortness of breath, Status post laser ablation of incompetent vein, Thyroid enlarged, Varicose veins of bilateral lower extremities with other complications, and Venous insufficiency.    Surgical History:  has no past surgical history on file.    Family History: family history includes COPD in her father; Heart attack in her mother; No Known Problems in her brother.. Otherwise no colon cancer, inflammatory bowel disease, or GI malignancies.    Social History:  reports that she has never smoked. She has never used smokeless tobacco. She reports that she does not drink alcohol and does not use drugs.    Review of patient's allergies indicates:   Allergen Reactions    No known drug allergies        Medications:   Medications Prior to Admission   Medication Sig Dispense Refill Last Dose     aspirin (ECOTRIN) 81 MG EC tablet Take 81 mg by mouth.   2/5/2023    valsartan (DIOVAN) 160 MG tablet Take 160 mg by mouth once daily.   2/6/2023    albuterol (PROVENTIL/VENTOLIN HFA) 90 mcg/actuation inhaler INHALE 1 TO 2 PUFFS BY MOUTH 4 TIMES DAILY AS NEEDED FOR WHEEZING 27 g 0     albuterol-ipratropium (DUO-NEB) 2.5 mg-0.5 mg/3 mL nebulizer solution USE 1 AMPULE IN NEBULIZER ONCE DAILY FOLLOWING AMIKACIN NEBULIZATION       alendronate (FOSAMAX) 70 MG tablet TAKE 1 TABLET BY MOUTH ONCE A WEEK EVERY  TUESDAY 15 tablet 3     ALPRAZolam (XANAX) 0.5 MG tablet TAKE 1 TABLET BY MOUTH THREE TIMES DAILY AS NEEDED FOR ANXIETY 270 tablet 0     amLODIPine (NORVASC) 10 MG tablet Take 10 mg by mouth.       ARIPiprazole (ABILIFY) 2 MG Tab Take 1 tablet (2 mg total) by mouth once daily. 30 tablet 1     atorvastatin (LIPITOR) 80 MG tablet Take 80 mg by mouth.       azithromycin (Z-AMANDO) 250 MG tablet TAKE 1 TABLET BY MOUTH ON MONDAY, WEDNESDAY AND FRIDAY. HOLD THIS MEDICATION WHILE ON RESCUE ANTIBOTICS FOR COPD EXACERBATIONS       cyclobenzaprine (FLEXERIL) 10 MG tablet Take 1 tablet (10 mg total) by mouth every evening. 30 tablet 5     dicyclomine (BENTYL) 20 mg tablet Take 1 tablet by mouth once daily.       diphenoxylate-atropine 2.5-0.025 mg (LOMOTIL) 2.5-0.025 mg per tablet Take 1 tablet by mouth 3 (three) times daily as needed.       DULoxetine (CYMBALTA) 60 MG capsule Take 1 capsule by mouth once daily 90 capsule 3     ergocalciferol (ERGOCALCIFEROL) 50,000 unit Cap Take 1 capsule by mouth once a week 12 capsule 1     estradiol 0.025 mg/24 hr 0.025 mg/24 hr APPLY 1 PATCH TOPICALLY ONCE A WEEK ON  FRIDAY 12 patch 0     famotidine (PEPCID) 40 MG tablet Take 40 mg by mouth every evening.       fluticasone propionate (FLONASE) 50 mcg/actuation nasal spray by Nasal route.       HYDROcodone-acetaminophen (NORCO) 5-325 mg per tablet Take 1 tablet by mouth every 6 (six) hours as needed for Pain. 20 tablet 0      L.acidophilus-B.bifidum,longum 150 mg (3 billion cell) Chew        mirtazapine (REMERON) 30 MG tablet Take 30 mg by mouth.       ondansetron (ZOFRAN-ODT) 8 MG TbDL DISSOLVE 1 TABLET IN MOUTH EVERY 6 HOURS AS NEEDED FOR NAUSEA       pantoprazole (PROTONIX) 40 MG tablet TAKE 1 TABLET BY MOUTH TWICE DAILY TAKE AT LEAST 30 MINUTES BEFORE MEAL BREAKFAST AND EVENINGS MEALS       potassium chloride SA (K-DUR,KLOR-CON) 20 MEQ tablet        roflumilast (DALIRESP) 500 mcg Tab Take 500 mcg by mouth.       sodium chloride 3% 3 % nebulizer solution USE 4 ML IN NEBULIZER ONCE DAILY       STIOLTO RESPIMAT 2.5-2.5 mcg/actuation Mist Inhale 2 puffs into the lungs once daily.       SUPREP BOWEL PREP KIT 17.5-3.13-1.6 gram SolR Take 1 kit by mouth.       temazepam (RESTORIL) 30 mg capsule Take 1 capsule (30 mg total) by mouth every evening. 30 capsule 5     traMADoL (ULTRAM) 50 mg tablet Take 50 mg by mouth every 8 (eight) hours as needed for Pain.          Objective Findings:    Vital Signs: Per nursing notes.    Physical Exam:  General Appearance: Well appearing in no acute distress  Head:   Normocephalic, without obvious abnormality  Eyes:    No scleral icterus  Airway: Open  Neck: No restriction in mobility  Lungs: CTA bilaterally in anterior and posterior fields, no wheezes, no crackles.  Heart:  Regular rate and rhythm, S1, S2 normal, no murmurs heard  Abdomen: Soft, non tender, non distended      Labs:  Lab Results   Component Value Date    WBC 6.7 11/28/2022    HGB 11.0 (L) 11/28/2022    HCT 34.5 (L) 11/28/2022     (H) 11/28/2022    ALT 13 11/28/2022    AST 22 11/28/2022     12/27/2022    K 3.9 12/27/2022    CREATININE 1.34 (H) 12/27/2022    BUN 11.0 12/27/2022    CO2 29 12/27/2022         I have explained the risks and benefits of endoscopy procedures to the patient including but not limited to bleeding, perforation, infection, and death.    Thank you so much for allowing me to participate in the care of Alesia  Win Davey Iii, MD

## 2023-02-06 NOTE — ANESTHESIA POSTPROCEDURE EVALUATION
Anesthesia Post Evaluation    Patient: Alesia Win    Procedure(s) Performed: Procedure(s) (LRB):  COLON (N/A)  COLONOSCOPY, WITH POLYPECTOMY USING SNARE    Final Anesthesia Type: general      Patient location during evaluation: GI PACU  Patient participation: Yes- Able to Participate  Level of consciousness: awake and alert  Post-procedure vital signs: reviewed and stable  Pain management: adequate  Airway patency: patent    PONV status at discharge: No PONV  Anesthetic complications: no      Cardiovascular status: blood pressure returned to baseline  Respiratory status: spontaneous ventilation and room air  Hydration status: euvolemic  Follow-up not needed.          Vitals Value Taken Time   /79 02/06/23 0800   Temp  02/06/23 1309   Pulse 91 02/06/23 0800   Resp 24 02/06/23 0800   SpO2 96 % 02/06/23 0800         No case tracking events are documented in the log.      Pain/Immanuel Score: Immanuel Score: 10 (2/6/2023  7:56 AM)

## 2023-02-06 NOTE — DISCHARGE SUMMARY
[facility] Endoscopy  Discharge Note  Short Stay    Procedure(s) (LRB):  COLON (N/A)  COLONOSCOPY, WITH POLYPECTOMY USING SNARE      OUTCOME: Patient tolerated treatment/procedure well without complication and is now ready for discharge.    DISPOSITION: Home or Self Care    FINAL DIAGNOSIS:  <principal problem not specified>    FOLLOWUP: In clinic    DISCHARGE INSTRUCTIONS:  No discharge procedures on file.     TIME SPENT ON DISCHARGE: 15 minutes

## 2023-02-06 NOTE — TRANSFER OF CARE
"Anesthesia Transfer of Care Note    Patient: Alesia Win    Procedure(s) Performed: Procedure(s) (LRB):  COLON (N/A)  COLONOSCOPY, WITH POLYPECTOMY USING SNARE    Patient location: GI    Anesthesia Type: MAC    Transport from OR: Transported from OR on room air with adequate spontaneous ventilation    Post pain: adequate analgesia    Post assessment: no apparent anesthetic complications    Post vital signs: stable    Level of consciousness: responds to stimulation    Nausea/Vomiting: no nausea/vomiting    Complications: none    Transfer of care protocol was followed      Last vitals:   Visit Vitals  BP (!) 161/75   Pulse 95   Resp (!) 24   Ht 5' 3" (1.6 m)   Wt 60.8 kg (134 lb)   SpO2 (!) 93%   BMI 23.74 kg/m²     "

## 2023-02-06 NOTE — [employer] PATIENT INSTRUCTIONS
Discharge Summary/Instructions after an Endoscopic Procedure  Patient Name: Alesia Walden  Patient MRN: [id number]  Patient YOB: 1950 Monday, February 6, 2023  Dorothea Davey III, MD  Dear patient,  As a result of recent federal legislation (The Federal Cures Act), you may   receive lab or pathology results from your procedure in your [facility]   account before your physician is able to contact you. Your physician or   their representative will relay the results to you with their   recommendations at their soonest availability.  Thank you,  RESTRICTIONS:  During your procedure today, you received medications for sedation.  These   medications may affect your judgment, balance and coordination.  Therefore,   for 24 hours, you have the following restrictions:   - DO NOT drive a car, operate machinery, make legal/financial decisions,   sign important papers or drink alcohol.    ACTIVITY:  Today: no heavy lifting, straining or running due to procedural   sedation/anesthesia.  The following day: return to full activity including work.  DIET:  Eat and drink normally unless instructed otherwise.     TREATMENT FOR COMMON SIDE EFFECTS:  - Mild abdominal pain, nausea, belching, bloating or excessive gas:  rest,   eat lightly and use a heating pad.  - Sore Throat: treat with throat lozenges and/or gargle with warm salt   water.  - Because air was used during the procedure, expelling large amounts of air   from your rectum or belching is normal.  - If a bowel prep was taken, you may not have a bowel movement for 1-3 days.    This is normal.  SYMPTOMS TO WATCH FOR AND REPORT TO YOUR PHYSICIAN:  1. Abdominal pain or bloating, other than gas cramps.  2. Chest pain.  3. Back pain.  4. Signs of infection such as: chills or fever occurring within 24 hours   after the procedure.  5. Rectal bleeding, which would show as bright red, maroon, or black stools.   (A tablespoon of blood from the rectum is not serious, especially  if   hemorrhoids are present.)  6. Vomiting.  7. Weakness or dizziness.  GO DIRECTLY TO THE NEAREST EMERGENCY ROOM IF YOU HAVE ANY OF THE FOLLOWING:      Difficulty breathing              Chills and/or fever over 101 F   Persistent vomiting and/or vomiting blood   Severe abdominal pain   Severe chest pain   Black, tarry stools   Bleeding- more than one tablespoon   Any other symptom or condition that you feel may need urgent attention  Your doctor recommends these additional instructions:  If any biopsies were taken, your doctors clinic will contact you in 1 to 2   weeks with any results.  - Perform an upper GI endoscopy at appointment to be scheduled.  For questions, problems or results please call your physician - Dorothea Davey III, MD at Work:  [phone].  [facility], EMERGENCY ROOM PHONE NUMBER: [phone]  IF A COMPLICATION OR EMERGENCY SITUATION ARISES AND YOU ARE UNABLE TO REACH   YOUR PHYSICIAN - GO DIRECTLY TO THE EMERGENCY ROOM.  Dorothea Davey III, MD  2/6/2023 7:46:13 AM  This report has been verified and signed electronically.  Dear patient,  As a result of recent federal legislation (The Federal Cures Act), you may   receive lab or pathology results from your procedure in your [facility]   account before your physician is able to contact you. Your physician or   their representative will relay the results to you with their   recommendations at their soonest availability.  Thank you,  [employer]ATION

## 2023-02-07 LAB — PSYCHE PATHOLOGY RESULT: NORMAL

## 2023-03-02 PROBLEM — M25.531 RIGHT WRIST PAIN: Status: ACTIVE | Noted: 2023-03-02

## 2023-03-02 PROBLEM — M65.4 DE QUERVAIN'S TENOSYNOVITIS, RIGHT: Status: ACTIVE | Noted: 2023-03-02

## 2023-03-06 ENCOUNTER — LAB VISIT (OUTPATIENT)
Dept: LAB | Facility: HOSPITAL | Age: 73
End: 2023-03-06
Attending: INTERNAL MEDICINE
Payer: MEDICARE

## 2023-03-06 DIAGNOSIS — J47.9 BRONCHIECTASIS WITHOUT ACUTE EXACERBATION: Primary | ICD-10-CM

## 2023-03-06 PROCEDURE — 87070 CULTURE OTHR SPECIMN AEROBIC: CPT

## 2023-03-07 ENCOUNTER — HOSPITAL ENCOUNTER (OUTPATIENT)
Facility: HOSPITAL | Age: 73
Discharge: HOME | End: 2023-03-07
Attending: INTERNAL MEDICINE | Admitting: INTERNAL MEDICINE
Payer: MEDICARE

## 2023-03-07 ENCOUNTER — ANESTHESIA EVENT (OUTPATIENT)
Dept: ENDOSCOPY | Facility: HOSPITAL | Age: 73
End: 2023-03-07
Payer: MEDICARE

## 2023-03-07 ENCOUNTER — ANESTHESIA (OUTPATIENT)
Dept: ENDOSCOPY | Facility: HOSPITAL | Age: 73
End: 2023-03-07
Payer: MEDICARE

## 2023-03-07 VITALS
HEART RATE: 87 BPM | WEIGHT: 135 LBS | OXYGEN SATURATION: 100 % | HEIGHT: 63 IN | TEMPERATURE: 97 F | RESPIRATION RATE: 19 BRPM | DIASTOLIC BLOOD PRESSURE: 73 MMHG | BODY MASS INDEX: 23.92 KG/M2 | SYSTOLIC BLOOD PRESSURE: 136 MMHG

## 2023-03-07 DIAGNOSIS — R13.14 DYSPHAGIA, PHARYNGOESOPHAGEAL PHASE: ICD-10-CM

## 2023-03-07 DIAGNOSIS — D64.9 ANEMIA, UNSPECIFIED TYPE: ICD-10-CM

## 2023-03-07 PROCEDURE — 43239 EGD BIOPSY SINGLE/MULTIPLE: CPT | Performed by: INTERNAL MEDICINE

## 2023-03-07 PROCEDURE — 25000003 PHARM REV CODE 250: Performed by: NURSE ANESTHETIST, CERTIFIED REGISTERED

## 2023-03-07 PROCEDURE — 37000009 HC ANESTHESIA EA ADD 15 MINS: Performed by: INTERNAL MEDICINE

## 2023-03-07 PROCEDURE — 88312 SPECIAL STAINS GROUP 1: CPT | Mod: 59

## 2023-03-07 PROCEDURE — 63600175 PHARM REV CODE 636 W HCPCS: Performed by: NURSE ANESTHETIST, CERTIFIED REGISTERED

## 2023-03-07 PROCEDURE — 88313 SPECIAL STAINS GROUP 2: CPT | Mod: 59

## 2023-03-07 PROCEDURE — 27201423 OPTIME MED/SURG SUP & DEVICES STERILE SUPPLY: Performed by: INTERNAL MEDICINE

## 2023-03-07 PROCEDURE — 88305 TISSUE EXAM BY PATHOLOGIST: CPT | Performed by: INTERNAL MEDICINE

## 2023-03-07 PROCEDURE — 37000008 HC ANESTHESIA 1ST 15 MINUTES: Performed by: INTERNAL MEDICINE

## 2023-03-07 RX ORDER — LIDOCAINE HYDROCHLORIDE 10 MG/ML
1 INJECTION, SOLUTION EPIDURAL; INFILTRATION; INTRACAUDAL; PERINEURAL ONCE
Status: CANCELLED | OUTPATIENT
Start: 2023-03-07 | End: 2023-03-07

## 2023-03-07 RX ORDER — ONDANSETRON 2 MG/ML
INJECTION INTRAMUSCULAR; INTRAVENOUS
Status: COMPLETED
Start: 2023-03-07 | End: 2023-03-07

## 2023-03-07 RX ORDER — PROPOFOL 10 MG/ML
INJECTION, EMULSION INTRAVENOUS
Status: DISCONTINUED | OUTPATIENT
Start: 2023-03-07 | End: 2023-03-07

## 2023-03-07 RX ORDER — LIDOCAINE HYDROCHLORIDE 20 MG/ML
INJECTION, SOLUTION EPIDURAL; INFILTRATION; INTRACAUDAL; PERINEURAL
Status: DISCONTINUED
Start: 2023-03-07 | End: 2023-03-07 | Stop reason: HOSPADM

## 2023-03-07 RX ORDER — ONDANSETRON 2 MG/ML
INJECTION INTRAMUSCULAR; INTRAVENOUS
Status: DISCONTINUED | OUTPATIENT
Start: 2023-03-07 | End: 2023-03-07

## 2023-03-07 RX ORDER — PROPOFOL 10 MG/ML
VIAL (ML) INTRAVENOUS
Status: COMPLETED
Start: 2023-03-07 | End: 2023-03-07

## 2023-03-07 RX ORDER — LIDOCAINE HCL/PF 100 MG/5ML
SYRINGE (ML) INTRAVENOUS
Status: DISCONTINUED | OUTPATIENT
Start: 2023-03-07 | End: 2023-03-07

## 2023-03-07 RX ORDER — SODIUM CHLORIDE, SODIUM GLUCONATE, SODIUM ACETATE, POTASSIUM CHLORIDE AND MAGNESIUM CHLORIDE 30; 37; 368; 526; 502 MG/100ML; MG/100ML; MG/100ML; MG/100ML; MG/100ML
INJECTION, SOLUTION INTRAVENOUS CONTINUOUS
Status: CANCELLED | OUTPATIENT
Start: 2023-03-07 | End: 2023-04-06

## 2023-03-07 RX ADMIN — LIDOCAINE HYDROCHLORIDE 100 MG: 20 INJECTION, SOLUTION INTRAVENOUS at 07:03

## 2023-03-07 RX ADMIN — ONDANSETRON 4 MG: 2 INJECTION INTRAMUSCULAR; INTRAVENOUS at 07:03

## 2023-03-07 RX ADMIN — SODIUM CHLORIDE, SODIUM GLUCONATE, SODIUM ACETATE, POTASSIUM CHLORIDE AND MAGNESIUM CHLORIDE: 526; 502; 368; 37; 30 INJECTION, SOLUTION INTRAVENOUS at 07:03

## 2023-03-07 RX ADMIN — PROPOFOL 50 MG: 10 INJECTION, EMULSION INTRAVENOUS at 07:03

## 2023-03-07 NOTE — ANESTHESIA POSTPROCEDURE EVALUATION
Anesthesia Post Evaluation    Patient: Alesia Walden    Procedure(s) Performed: Procedure(s) (LRB):  EGD (N/A)    Final Anesthesia Type: general      Patient location during evaluation: PACU  Patient participation: Yes- Able to Participate  Level of consciousness: awake and alert  Post-procedure vital signs: reviewed and stable  Pain management: adequate  Airway patency: patent    PONV status at discharge: No PONV, nausea (controlled) and vomiting (controlled)  Anesthetic complications: no      Cardiovascular status: blood pressure returned to baseline and stable  Respiratory status: unassisted  Hydration status: euvolemic  Follow-up not needed.          Vitals Value Taken Time   /73 03/07/23 0746   Temp 36 °C (96.8 °F) 03/07/23 0718   Pulse 87 03/07/23 0746   Resp 19 03/07/23 0746   SpO2 100 % 03/07/23 0746         No case tracking events are documented in the log.      Pain/Immanuel Score: Immanuel Score: 10 (3/7/2023  7:47 AM)

## 2023-03-07 NOTE — H&P
Endoscopy History and Physical    PCP - Chuck Estrella MD    Procedure - EGD  Sedation: MAC  ASA - per anesthesia  Mallampati - per anesthesia  History of Anesthesia problems - no  Family history Anesthesia problems -  no     HPI:  This is a 72 y.o. female here for evaluation of heartburn and iron deficiency anemia    Reflux - no  Dysphagia - no  Abdominal pain - no  Diarrhea - no    ROS:  Constitutional: No fevers, chills, No weight loss  ENT: No allergies  CV: No chest pain  Pulm: No cough, No shortness of breath  Ophtho: No vision changes  GI: see HPI  Medical History:  has a past medical history of Abdominal pain, LLQ, Abdominal pain, RLQ, Acute bilateral low back pain with left-sided sciatica, Anxiety, Apnea, sleep, Cardiac microvascular disease, Carotid bruit, Constipation, unspecified, COPD (chronic obstructive pulmonary disease), Depressive disorder, Diverticulitis, Dyslipidemia, Essential hypertension, Fungal infection of lung, GERD (gastroesophageal reflux disease), chronic, Heart valve disorder, Iron deficiency anemia, Moderate aortic regurgitation, Primary hypertension, PTCA with stent insertion, Shortness of breath, Status post laser ablation of incompetent vein, Thyroid enlarged, Varicose veins of bilateral lower extremities with other complications, and Venous insufficiency.    Surgical History:  has a past surgical history that includes Colonoscopy (N/A, 2/6/2023) and colonoscopy, with polypectomy using snare (2/6/2023).    Family History: family history includes COPD in her father; Heart attack in her mother; No Known Problems in her brother.. Otherwise no colon cancer, inflammatory bowel disease, or GI malignancies.    Social History:  reports that she has never smoked. She has never used smokeless tobacco. She reports that she does not drink alcohol and does not use drugs.    Review of patient's allergies indicates:   Allergen Reactions    No known drug allergies        Medications:   Medications  Prior to Admission   Medication Sig Dispense Refill Last Dose    albuterol (PROVENTIL/VENTOLIN HFA) 90 mcg/actuation inhaler INHALE 1 TO 2 PUFFS BY MOUTH 4 TIMES DAILY AS NEEDED FOR WHEEZING 27 g 0     albuterol-ipratropium (DUO-NEB) 2.5 mg-0.5 mg/3 mL nebulizer solution USE 1 AMPULE IN NEBULIZER ONCE DAILY FOLLOWING AMIKACIN NEBULIZATION       alendronate (FOSAMAX) 70 MG tablet TAKE 1 TABLET BY MOUTH ONCE A WEEK EVERY  TUESDAY 15 tablet 3     ALPRAZolam (XANAX) 0.5 MG tablet TAKE 1 TABLET BY MOUTH THREE TIMES DAILY AS NEEDED FOR ANXIETY 270 tablet 0     amLODIPine (NORVASC) 10 MG tablet Take 10 mg by mouth.       ARIPiprazole (ABILIFY) 2 MG Tab Take 1 tablet by mouth once daily 30 tablet 5     aspirin (ECOTRIN) 81 MG EC tablet Take 81 mg by mouth.       atorvastatin (LIPITOR) 80 MG tablet Take 80 mg by mouth.       azithromycin (Z-AMANDO) 250 MG tablet TAKE 1 TABLET BY MOUTH ON MONDAY, WEDNESDAY AND FRIDAY. HOLD THIS MEDICATION WHILE ON RESCUE ANTIBOTICS FOR COPD EXACERBATIONS       cyclobenzaprine (FLEXERIL) 10 MG tablet Take 1 tablet (10 mg total) by mouth every evening. 30 tablet 5     diclofenac sodium (VOLTAREN) 1 % Gel Apply 2 g topically 2 (two) times daily. 100 g 0     dicyclomine (BENTYL) 20 mg tablet Take 1 tablet by mouth once daily.       diphenoxylate-atropine 2.5-0.025 mg (LOMOTIL) 2.5-0.025 mg per tablet Take 1 tablet by mouth 3 (three) times daily as needed.       DULoxetine (CYMBALTA) 60 MG capsule Take 1 capsule by mouth once daily 90 capsule 3     ergocalciferol (ERGOCALCIFEROL) 50,000 unit Cap Take 1 capsule by mouth once a week 12 capsule 1     estradiol 0.025 mg/24 hr 0.025 mg/24 hr APPLY 1 PATCH TOPICALLY ONCE A WEEK ON  FRIDAY 12 patch 0     famotidine (PEPCID) 40 MG tablet Take 40 mg by mouth every evening.       fluticasone propionate (FLONASE) 50 mcg/actuation nasal spray by Nasal route.       HYDROcodone-acetaminophen (NORCO) 5-325 mg per tablet Take 1 tablet by mouth every 6 (six) hours as  needed for Pain. 20 tablet 0     L.acidophilus-B.bifidum,longum 150 mg (3 billion cell) Chew        mirtazapine (REMERON) 30 MG tablet Take 30 mg by mouth.       ondansetron (ZOFRAN-ODT) 8 MG TbDL DISSOLVE 1 TABLET IN MOUTH EVERY 6 HOURS AS NEEDED FOR NAUSEA       pantoprazole (PROTONIX) 40 MG tablet TAKE 1 TABLET BY MOUTH TWICE DAILY TAKE AT LEAST 30 MINUTES BEFORE MEAL BREAKFAST AND EVENINGS MEALS       potassium chloride SA (K-DUR,KLOR-CON) 20 MEQ tablet        roflumilast (DALIRESP) 500 mcg Tab Take 500 mcg by mouth.       sodium chloride 3% 3 % nebulizer solution USE 4 ML IN NEBULIZER ONCE DAILY       STIOLTO RESPIMAT 2.5-2.5 mcg/actuation Mist Inhale 2 puffs into the lungs once daily.       SUPREP BOWEL PREP KIT 17.5-3.13-1.6 gram SolR Take 1 kit by mouth.       temazepam (RESTORIL) 30 mg capsule Take 1 capsule (30 mg total) by mouth every evening. 30 capsule 5     traMADoL (ULTRAM) 50 mg tablet Take 50 mg by mouth every 8 (eight) hours as needed for Pain.       valsartan (DIOVAN) 160 MG tablet Take 160 mg by mouth once daily.          Objective Findings:    Vital Signs: Per nursing notes.    Physical Exam:  General Appearance: Well appearing in no acute distress  Head:   Normocephalic, without obvious abnormality  Eyes:    No scleral icterus  Airway: Open  Neck: No restriction in mobility  Lungs: CTA bilaterally in anterior and posterior fields, no wheezes, no crackles.  Heart:  Regular rate and rhythm, S1, S2 normal, no murmurs heard  Abdomen: Soft, non tender, non distended      Labs:  Lab Results   Component Value Date    WBC 6.7 11/28/2022    HGB 11.0 (L) 11/28/2022    HCT 34.5 (L) 11/28/2022     (H) 11/28/2022    ALT 13 11/28/2022    AST 22 11/28/2022     12/27/2022    K 3.9 12/27/2022    CREATININE 1.34 (H) 12/27/2022    BUN 11.0 12/27/2022    CO2 29 12/27/2022         I have explained the risks and benefits of endoscopy procedures to the patient including but not limited to bleeding,  perforation, infection, and death.    Thank you so much for allowing me to participate in the care of Alesia Davey Iii, MD

## 2023-03-07 NOTE — DISCHARGE SUMMARY
[facility] Endoscopy  Discharge Note  Short Stay    Procedure(s) (LRB):  EGD (N/A)      OUTCOME: Patient tolerated treatment/procedure well without complication and is now ready for discharge.    DISPOSITION: Home or Self Care    FINAL DIAGNOSIS:  <principal problem not specified>    FOLLOWUP: In clinic    DISCHARGE INSTRUCTIONS:  No discharge procedures on file.     TIME SPENT ON DISCHARGE: 15 minutes

## 2023-03-07 NOTE — ANESTHESIA PREPROCEDURE EVALUATION
03/07/2023  Alesia Walden is a 72 y.o., female who presents with Abdominal pain, Dysphagia and Anemia.  Diagnosis:        Anemia, unspecified type       Dysphagia, pharyngoesophageal phase      The pt. comes to [facility] endoscopy for the noted procedure under GA/TIVA.  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   Obstructive sleep apnea Arthralgia   Hypokalemia Primary hypertension   Immunization due Depressive disorder   Right wrist pain De Quervain's tenosynovitis, right       Acute bilateral low back pain with left-sided sciatica    Other Medical History   Abdominal pain, LLQ Abdominal pain, RLQ   Constipation, unspecified Iron deficiency anemia   Depressive disorder COPD (chronic obstructive pulmonary disease)   Primary hypertension Heart valve disorder   Diverticulitis Dyslipidemia   Anxiety Apnea, sleep   Essential hypertension Varicose veins of bilateral lower extremities with other complications   Fungal infection of lung Cardiac microvascular disease   Status post laser ablation of incompetent vein GERD (gastroesophageal reflux disease), chronic   PTCA with stent insertion Venous insufficiency   Shortness of breath Thyroid enlarged   Moderate aortic regurgitation Carotid bruit     Surgical History:  COLONOSCOPY COLONOSCOPY, WITH POLYPECTOMY USING SNARE           Vital signs:        Lab Data:      EKG:    Pre-op Assessment    I have reviewed the Patient Summary Reports.     I have reviewed the Nursing Notes. I have reviewed the NPO Status.   I have reviewed the Medications.     Review of Systems  Anesthesia Hx:  No problems with previous Anesthesia    Social:  Non-Smoker    Hematology/Oncology:     Oncology Normal    -- Anemia:   EENT/Dental:EENT/Dental Normal    Cardiovascular:   Exercise tolerance: good Hypertension  Functional Capacity good / => 4 METS    Pulmonary:   COPD    Renal/:  Renal/ Normal     Hepatic/GI:   GERD    Musculoskeletal:  Musculoskeletal Normal    Neurological:   Neuromuscular Disease,    Endocrine:  Endocrine Normal    Dermatological:  Skin Normal    Psych:   Psychiatric History depression          Physical Exam  General: Alert, Oriented, Well nourished and Cooperative    Airway:  Mallampati: II   Mouth Opening: Normal  TM Distance: Normal  Tongue: Normal  Neck ROM: Normal ROM    Dental:  Intact    Chest/Lungs:  Clear to auscultation, Normal Respiratory Rate    Heart:  Rate: Normal  Rhythm: Regular Rhythm        Anesthesia Plan  Type of Anesthesia, risks & benefits discussed:    Anesthesia Type: Gen Natural Airway  Intra-op Monitoring Plan: Standard ASA Monitors  Induction:  IV  Informed Consent: Informed consent signed with the Patient and all parties understand the risks and agree with anesthesia plan.  All questions answered.   ASA Score: 3  Day of Surgery Review of History & Physical: H&P Update referred to the surgeon/provider.    Ready For Surgery From Anesthesia Perspective.     .

## 2023-03-07 NOTE — [employer]ATION PATIENT INSTRUCTIONS
Discharge Summary/Instructions after an Endoscopic Procedure  Patient Name: Alesia Walden  Patient MRN: [id number]  Patient YOB: 1950 Tuesday, March 7, 2023  Dorothea Davey III, MD  Dear patient,  As a result of recent federal legislation (The Federal Cures Act), you may   receive lab or pathology results from your procedure in your [facility]   account before your physician is able to contact you. Your physician or   their representative will relay the results to you with their   recommendations at their soonest availability.  Thank you,  RESTRICTIONS:  During your procedure today, you received medications for sedation.  These   medications may affect your judgment, balance and coordination.  Therefore,   for 24 hours, you have the following restrictions:   - DO NOT drive a car, operate machinery, make legal/financial decisions,   sign important papers or drink alcohol.    ACTIVITY:  Today: no heavy lifting, straining or running due to procedural   sedation/anesthesia.  The following day: return to full activity including work.  DIET:  Eat and drink normally unless instructed otherwise.     TREATMENT FOR COMMON SIDE EFFECTS:  - Mild abdominal pain, nausea, belching, bloating or excessive gas:  rest,   eat lightly and use a heating pad.  - Sore Throat: treat with throat lozenges and/or gargle with warm salt   water.  - Because air was used during the procedure, expelling large amounts of air   from your rectum or belching is normal.  - If a bowel prep was taken, you may not have a bowel movement for 1-3 days.    This is normal.  SYMPTOMS TO WATCH FOR AND REPORT TO YOUR PHYSICIAN:  1. Abdominal pain or bloating, other than gas cramps.  2. Chest pain.  3. Back pain.  4. Signs of infection such as: chills or fever occurring within 24 hours   after the procedure.  5. Rectal bleeding, which would show as bright red, maroon, or black stools.   (A tablespoon of blood from the rectum is not serious, especially if    hemorrhoids are present.)  6. Vomiting.  7. Weakness or dizziness.  GO DIRECTLY TO THE NEAREST EMERGENCY ROOM IF YOU HAVE ANY OF THE FOLLOWING:      Difficulty breathing              Chills and/or fever over 101 F   Persistent vomiting and/or vomiting blood   Severe abdominal pain   Severe chest pain   Black, tarry stools   Bleeding- more than one tablespoon   Any other symptom or condition that you feel may need urgent attention  Your doctor recommends these additional instructions:  If any biopsies were taken, your doctors clinic will contact you in 1 to 2   weeks with any results.  - Await pathology results.  For questions, problems or results please call your physician - Dorothea Davey III, MD at Work:  [phone].  [facility], EMERGENCY ROOM PHONE NUMBER: [phone]  IF A COMPLICATION OR EMERGENCY SITUATION ARISES AND YOU ARE UNABLE TO REACH   YOUR PHYSICIAN - GO DIRECTLY TO THE EMERGENCY ROOM.  Dorothea Davey III, MD  3/7/2023 7:26:12 AM  This report has been verified and signed electronically.  Dear patient,  As a result of recent federal legislation (The Federal Cures Act), you may   receive lab or pathology results from your procedure in your [facility]   account before your physician is able to contact you. Your physician or   their representative will relay the results to you with their   recommendations at their soonest availability.  Thank you,  PROVATION

## 2023-03-08 LAB — PSYCHE PATHOLOGY RESULT: NORMAL

## 2023-03-09 LAB
BACTERIA SPEC CULT: ABNORMAL
GRAM STN SPEC: ABNORMAL
MYCOBACTERIUM SPEC QL CULT: NORMAL

## 2023-05-01 ENCOUNTER — LAB VISIT (OUTPATIENT)
Dept: LAB | Facility: HOSPITAL | Age: 73
End: 2023-05-01
Payer: MEDICARE

## 2023-05-01 DIAGNOSIS — J47.0 ACUTE BRONCHITIS WITH BRONCHIECTASIS: Primary | ICD-10-CM

## 2023-05-01 PROCEDURE — 87186 SC STD MICRODIL/AGAR DIL: CPT

## 2023-05-01 PROCEDURE — 87206 SMEAR FLUORESCENT/ACID STAI: CPT | Mod: 90

## 2023-05-01 PROCEDURE — 87206 SMEAR FLUORESCENT/ACID STAI: CPT

## 2023-05-01 PROCEDURE — 87116 MYCOBACTERIA CULTURE: CPT

## 2023-05-01 PROCEDURE — 87118 MYCOBACTERIC IDENTIFICATION: CPT

## 2023-05-01 PROCEDURE — 87070 CULTURE OTHR SPECIMN AEROBIC: CPT

## 2023-05-02 LAB — M AVIUM PARATB TISS QL ZN STN: NORMAL

## 2023-05-03 LAB
BACTERIA SPEC CULT: NORMAL
GRAM STN SPEC: NORMAL
RHODAMINE-AURAMINE STN SPEC: NORMAL

## 2023-06-02 ENCOUNTER — LAB VISIT (OUTPATIENT)
Dept: LAB | Facility: HOSPITAL | Age: 73
End: 2023-06-02
Attending: INTERNAL MEDICINE
Payer: MEDICARE

## 2023-06-02 DIAGNOSIS — D64.9 ANEMIA, UNSPECIFIED TYPE: Primary | ICD-10-CM

## 2023-06-02 LAB
BASOPHILS # BLD AUTO: 0.04 X10(3)/MCL
BASOPHILS NFR BLD AUTO: 0.5 %
EOSINOPHIL # BLD AUTO: 0.32 X10(3)/MCL (ref 0–0.9)
EOSINOPHIL NFR BLD AUTO: 4.1 %
ERYTHROCYTE [DISTWIDTH] IN BLOOD BY AUTOMATED COUNT: 14.3 % (ref 11.5–17)
HCT VFR BLD AUTO: 36.2 % (ref 37–47)
HGB BLD-MCNC: 11.2 G/DL (ref 12–16)
IMM GRANULOCYTES # BLD AUTO: 0.03 X10(3)/MCL (ref 0–0.04)
IMM GRANULOCYTES NFR BLD AUTO: 0.4 %
LYMPHOCYTES # BLD AUTO: 1.45 X10(3)/MCL (ref 0.6–4.6)
LYMPHOCYTES NFR BLD AUTO: 18.6 %
MCH RBC QN AUTO: 26.5 PG (ref 27–31)
MCHC RBC AUTO-ENTMCNC: 30.9 G/DL (ref 33–36)
MCV RBC AUTO: 85.8 FL (ref 80–94)
MONOCYTES # BLD AUTO: 0.9 X10(3)/MCL (ref 0.1–1.3)
MONOCYTES NFR BLD AUTO: 11.6 %
NEUTROPHILS # BLD AUTO: 5.05 X10(3)/MCL (ref 2.1–9.2)
NEUTROPHILS NFR BLD AUTO: 64.8 %
NRBC BLD AUTO-RTO: 0 %
PLATELET # BLD AUTO: 360 X10(3)/MCL (ref 130–400)
PMV BLD AUTO: 9 FL (ref 7.4–10.4)
RBC # BLD AUTO: 4.22 X10(6)/MCL (ref 4.2–5.4)
WBC # SPEC AUTO: 7.79 X10(3)/MCL (ref 4.5–11.5)

## 2023-06-02 PROCEDURE — 85025 COMPLETE CBC W/AUTO DIFF WBC: CPT

## 2023-06-02 PROCEDURE — 36415 COLL VENOUS BLD VENIPUNCTURE: CPT

## 2023-06-09 ENCOUNTER — LAB VISIT (OUTPATIENT)
Dept: LAB | Facility: HOSPITAL | Age: 73
End: 2023-06-09
Payer: MEDICARE

## 2023-06-09 DIAGNOSIS — J47.0 ACUTE BRONCHITIS WITH BRONCHIECTASIS: Primary | ICD-10-CM

## 2023-06-09 PROCEDURE — 87070 CULTURE OTHR SPECIMN AEROBIC: CPT

## 2023-06-09 PROCEDURE — 87206 SMEAR FLUORESCENT/ACID STAI: CPT

## 2023-06-10 LAB — M AVIUM PARATB TISS QL ZN STN: NORMAL

## 2023-06-11 LAB
BACTERIA SPEC CULT: NORMAL
GRAM STN SPEC: NORMAL

## 2023-06-14 LAB — MYCOBACTERIUM SPEC QL CULT: ABNORMAL

## 2023-06-26 LAB — MAYO GENERIC ORDERABLE RESULT: ABNORMAL

## 2023-07-17 ENCOUNTER — OFFICE VISIT (OUTPATIENT)
Dept: URGENT CARE | Facility: CLINIC | Age: 73
End: 2023-07-17
Payer: MEDICARE

## 2023-07-17 VITALS
RESPIRATION RATE: 17 BRPM | HEART RATE: 90 BPM | SYSTOLIC BLOOD PRESSURE: 113 MMHG | TEMPERATURE: 98 F | HEIGHT: 63 IN | BODY MASS INDEX: 24.8 KG/M2 | DIASTOLIC BLOOD PRESSURE: 68 MMHG | OXYGEN SATURATION: 97 % | WEIGHT: 140 LBS

## 2023-07-17 DIAGNOSIS — U07.1 COVID-19: Primary | ICD-10-CM

## 2023-07-17 DIAGNOSIS — U07.1 COVID-19 VIRUS DETECTED: ICD-10-CM

## 2023-07-17 DIAGNOSIS — R50.9 FEVER, UNSPECIFIED FEVER CAUSE: ICD-10-CM

## 2023-07-17 LAB
CTP QC/QA: YES
SARS-COV-2 RDRP RESP QL NAA+PROBE: POSITIVE

## 2023-07-17 PROCEDURE — 87635 SARS-COV-2 COVID-19 AMP PRB: CPT | Mod: QW,,, | Performed by: FAMILY MEDICINE

## 2023-07-17 PROCEDURE — 99213 OFFICE O/P EST LOW 20 MIN: CPT | Mod: ,,, | Performed by: FAMILY MEDICINE

## 2023-07-17 PROCEDURE — 99213 PR OFFICE/OUTPT VISIT, EST, LEVL III, 20-29 MIN: ICD-10-PCS | Mod: ,,, | Performed by: FAMILY MEDICINE

## 2023-07-17 PROCEDURE — 87635: ICD-10-PCS | Mod: QW,,, | Performed by: FAMILY MEDICINE

## 2023-07-17 RX ORDER — NIRMATRELVIR AND RITONAVIR 300-100 MG
KIT ORAL
Qty: 30 TABLET | Refills: 0 | Status: SHIPPED | OUTPATIENT
Start: 2023-07-17 | End: 2023-08-29

## 2023-07-17 RX ORDER — BENZONATATE 200 MG/1
200 CAPSULE ORAL 3 TIMES DAILY PRN
Qty: 15 CAPSULE | Refills: 0 | Status: SHIPPED | OUTPATIENT
Start: 2023-07-17 | End: 2023-07-22

## 2023-07-17 NOTE — PATIENT INSTRUCTIONS
With new onset fever, swabs obtained for COVID-19.  COVID-19 Test positive  Adequate hydration and rest. Monitor the symptoms closely  Signs and symptoms that should prompt reevaluation discussed as well  Recommendation is to follow a timeline quarantine measures per CDC and LDH  Tylenol as needed for fever, body aches and headache. Antihistamine of choice for congestion.   Patient can continue Delsym for cough and cold as needed and as directed.  Trial of vitamin-C and vitamin D3 while in quarantine  At home quarantine instructions for 5 days since start of symptoms, no fever (100.4 and above) for 24 hours and with improved symptoms can stop home quarantine  Wear a mask, cover your cough and sneeze. Clean any shared surfaces. ER precautions with any acute change in symptoms.   Paxlovid, EAU prescription shared, patient will check with pulmonologist.  Plan to hold cholesterol medication while taking Paxlovid and for another 5 days, after the treatment, total of 10 days.  Call this clinic for any questions

## 2023-07-17 NOTE — PROGRESS NOTES
"Subjective:      Patient ID: Alesia Walden is a 72 y.o. female.    Vitals:  height is 5' 3" (1.6 m) and weight is 63.5 kg (140 lb). Her temperature is 98.1 °F (36.7 °C). Her blood pressure is 113/68 and her pulse is 90. Her respiration is 17 and oxygen saturation is 97%.     Chief Complaint: Cough (X Thursday- fever-101.6, cough, shortness of breath - HX of COPD - tylenol at 8am - currently on azithromycin for lung infection and takes it Monday, Wednesday, and fridays - on 2 liters of o2 currently )    HPI:  72-year-old female known for multiple medical condition including COPD, states history of sarcoidosis follows up with primary MD and pulmonologist.  Last seen by pulmonologist 5 days ago.  Patient is on azithromycin 3 times a week, MAC.  Fever this morning of 101.6.  For which patient has taken Tylenol.  States coughing and congestion since 5 days.  No concerns of positive exposure to infections.  With history of COPD currently on oxygen 2 L.  Patient lives at home with her spouse.  Vaccinated for COVID-19.  No concerns of positive exposure to infections.  Reviewed the vital signs, appears stable with O2 sats 97%.    ROS :  Constitutional : _ fever , Body aches, Chills  HENT_sore throat, postnasal drainage  Respiratory_no wheezing, no shortness of breath  Cardiovascular_no chest pain  Gastrointestinal_ No vomiting, No diarrhea, No abdominal pain  Musculoskeletal_no joint pain, no joint swelling  Integumentary_no skin rash    Objective:     Physical Exam  General : Alert and Oriented, No apparent distress, afebrile, appears comfortable sitting in the exam chair, clear speech and appropriate communication  Neck - supple  HENT : Oropharynx no redness or swelling.   Respiratory : Bilateral equal breath sounds, nonlabored respirations  Cardiovascular : Rate, rhythm regular, normal volume pulse, no murmur  Integumentary : Warm, Dry and no rash    Assessment:     1. COVID-19    2. Fever, unspecified fever cause  "     Plan:   With new onset fever, swabs obtained for COVID-19.  COVID-19 Test positive  Adequate hydration and rest. Monitor the symptoms closely  Signs and symptoms that should prompt reevaluation discussed as well  Recommendation is to follow a timeline quarantine measures per CDC and LDH  Tylenol as needed for fever, body aches and headache. Antihistamine of choice for congestion.   Patient can continue Delsym for cough and cold as needed and as directed.  Trial of vitamin-C and vitamin D3 while in quarantine  At home quarantine instructions for 5 days since start of symptoms, no fever (100.4 and above) for 24 hours and with improved symptoms can stop home quarantine  Wear a mask, cover your cough and sneeze. Clean any shared surfaces. ER precautions with any acute change in symptoms.   Paxlovid, EAU prescription shared, patient will check with pulmonologist.  Plan to hold cholesterol medication while taking Paxlovid and for another 5 days, after the treatment, total of 10 days.  Call this clinic for any questions    COVID-19  -     benzonatate (TESSALON) 200 MG capsule; Take 1 capsule (200 mg total) by mouth 3 (three) times daily as needed for Cough.  Dispense: 15 capsule; Refill: 0    Fever, unspecified fever cause  -     POCT COVID-19 Rapid Screening    Other orders  -     nirmatrelvir-ritonavir (PAXLOVID) 300 mg (150 mg x 2)-100 mg copackaged tablets (EUA); Take 3 tablets by mouth 2 (two) times daily. Each dose contains 2 nirmatrelvir (pink tablets) and 1 ritonavir (white tablet). Take all 3 tablets together  Dispense: 30 tablet; Refill: 0

## 2023-08-29 PROBLEM — R53.83 FATIGUE: Status: ACTIVE | Noted: 2023-08-29

## 2023-08-29 PROBLEM — Z13.31 POSITIVE DEPRESSION SCREENING: Status: ACTIVE | Noted: 2023-08-29

## 2023-10-09 ENCOUNTER — APPOINTMENT (OUTPATIENT)
Dept: LAB | Facility: HOSPITAL | Age: 73
End: 2023-10-09
Attending: INTERNAL MEDICINE
Payer: MEDICARE

## 2023-10-09 DIAGNOSIS — D64.9 ANEMIA, UNSPECIFIED TYPE: Primary | ICD-10-CM

## 2023-10-09 LAB
BASOPHILS # BLD AUTO: 0.04 X10(3)/MCL
BASOPHILS NFR BLD AUTO: 0.7 %
EOSINOPHIL # BLD AUTO: 0.18 X10(3)/MCL (ref 0–0.9)
EOSINOPHIL NFR BLD AUTO: 3.3 %
ERYTHROCYTE [DISTWIDTH] IN BLOOD BY AUTOMATED COUNT: 12.9 % (ref 11.5–17)
HCT VFR BLD AUTO: 37.1 % (ref 37–47)
HGB BLD-MCNC: 11.9 G/DL (ref 12–16)
IMM GRANULOCYTES # BLD AUTO: 0.01 X10(3)/MCL (ref 0–0.04)
IMM GRANULOCYTES NFR BLD AUTO: 0.2 %
LYMPHOCYTES # BLD AUTO: 1.11 X10(3)/MCL (ref 0.6–4.6)
LYMPHOCYTES NFR BLD AUTO: 20.1 %
MCH RBC QN AUTO: 27.9 PG (ref 27–31)
MCHC RBC AUTO-ENTMCNC: 32.1 G/DL (ref 33–36)
MCV RBC AUTO: 86.9 FL (ref 80–94)
MONOCYTES # BLD AUTO: 0.61 X10(3)/MCL (ref 0.1–1.3)
MONOCYTES NFR BLD AUTO: 11 %
NEUTROPHILS # BLD AUTO: 3.58 X10(3)/MCL (ref 2.1–9.2)
NEUTROPHILS NFR BLD AUTO: 64.7 %
NRBC BLD AUTO-RTO: 0 %
PLATELET # BLD AUTO: 313 X10(3)/MCL (ref 130–400)
PMV BLD AUTO: 9.3 FL (ref 7.4–10.4)
RBC # BLD AUTO: 4.27 X10(6)/MCL (ref 4.2–5.4)
WBC # SPEC AUTO: 5.53 X10(3)/MCL (ref 4.5–11.5)

## 2023-10-09 PROCEDURE — 36415 COLL VENOUS BLD VENIPUNCTURE: CPT

## 2023-10-09 PROCEDURE — 85025 COMPLETE CBC W/AUTO DIFF WBC: CPT

## 2023-11-27 PROBLEM — Z12.31 SCREENING MAMMOGRAM FOR BREAST CANCER: Status: ACTIVE | Noted: 2023-11-27

## 2023-11-29 ENCOUNTER — HOSPITAL ENCOUNTER (OUTPATIENT)
Dept: RADIOLOGY | Facility: HOSPITAL | Age: 73
Discharge: HOME | End: 2023-11-29
Attending: INTERNAL MEDICINE
Payer: MEDICARE

## 2023-11-29 DIAGNOSIS — A31.0 PULMONARY INFECTION DUE TO MYCOBACTERIUM AVIUM COMPLEX: ICD-10-CM

## 2023-11-29 PROCEDURE — 71250 CT THORAX DX C-: CPT | Mod: TC

## 2023-12-15 DIAGNOSIS — K21.9 GASTROESOPHAGEAL REFLUX DISEASE: Primary | ICD-10-CM

## 2023-12-19 ENCOUNTER — HOSPITAL ENCOUNTER (OUTPATIENT)
Dept: RADIOLOGY | Facility: HOSPITAL | Age: 73
Discharge: HOME | End: 2023-12-19
Attending: INTERNAL MEDICINE
Payer: MEDICARE

## 2023-12-19 DIAGNOSIS — K21.9 GASTROESOPHAGEAL REFLUX DISEASE: ICD-10-CM

## 2023-12-19 PROCEDURE — 74240 X-RAY XM UPR GI TRC 1CNTRST: CPT | Mod: TC

## 2023-12-19 PROCEDURE — A9698 NON-RAD CONTRAST MATERIALNOC: HCPCS | Performed by: INTERNAL MEDICINE

## 2023-12-19 PROCEDURE — 25500020 PHARM REV CODE 255: Performed by: INTERNAL MEDICINE

## 2023-12-19 RX ADMIN — BARIUM SULFATE 40 ML: 0.6 SUSPENSION ORAL at 08:12

## 2023-12-20 DIAGNOSIS — R10.13 EPIGASTRIC PAIN: Primary | ICD-10-CM

## 2023-12-20 DIAGNOSIS — R11.0 NAUSEA: ICD-10-CM

## 2023-12-22 ENCOUNTER — HOSPITAL ENCOUNTER (OUTPATIENT)
Dept: RADIOLOGY | Facility: HOSPITAL | Age: 73
Discharge: HOME | End: 2023-12-22
Attending: INTERNAL MEDICINE
Payer: MEDICARE

## 2023-12-22 DIAGNOSIS — R10.13 EPIGASTRIC PAIN: ICD-10-CM

## 2023-12-22 DIAGNOSIS — R11.0 NAUSEA: ICD-10-CM

## 2023-12-22 PROCEDURE — 63600175 PHARM REV CODE 636 W HCPCS: Performed by: INTERNAL MEDICINE

## 2023-12-22 PROCEDURE — A9537 TC99M MEBROFENIN: HCPCS

## 2023-12-22 PROCEDURE — 78226 HEPATOBILIARY SYSTEM IMAGING: CPT | Mod: TC

## 2023-12-22 RX ORDER — SINCALIDE 5 UG/5ML
0.02 INJECTION, POWDER, LYOPHILIZED, FOR SOLUTION INTRAVENOUS ONCE
Status: COMPLETED | OUTPATIENT
Start: 2023-12-22 | End: 2023-12-22

## 2023-12-22 RX ADMIN — SINCALIDE 1 MCG: 5 INJECTION, POWDER, LYOPHILIZED, FOR SOLUTION INTRAVENOUS at 11:12

## 2024-01-02 ENCOUNTER — HOSPITAL ENCOUNTER (EMERGENCY)
Facility: HOSPITAL | Age: 74
Discharge: HOME | End: 2024-01-02
Attending: EMERGENCY MEDICINE
Payer: MEDICARE

## 2024-01-02 VITALS
SYSTOLIC BLOOD PRESSURE: 136 MMHG | WEIGHT: 104 LBS | TEMPERATURE: 98 F | BODY MASS INDEX: 18.43 KG/M2 | HEART RATE: 65 BPM | HEIGHT: 63 IN | DIASTOLIC BLOOD PRESSURE: 58 MMHG | OXYGEN SATURATION: 97 % | RESPIRATION RATE: 16 BRPM

## 2024-01-02 DIAGNOSIS — R10.13 EPIGASTRIC ABDOMINAL PAIN: Primary | ICD-10-CM

## 2024-01-02 DIAGNOSIS — R10.13 EPIGASTRIC PAIN: ICD-10-CM

## 2024-01-02 LAB
ALBUMIN SERPL-MCNC: 3.6 G/DL (ref 3.4–4.8)
ALBUMIN/GLOB SERPL: 1 RATIO (ref 1.1–2)
ALP SERPL-CCNC: 79 UNIT/L (ref 40–150)
ALT SERPL-CCNC: 12 UNIT/L (ref 0–55)
APPEARANCE UR: CLEAR
AST SERPL-CCNC: 17 UNIT/L (ref 5–34)
BACTERIA #/AREA URNS AUTO: NORMAL /HPF
BASOPHILS # BLD AUTO: 0.04 X10(3)/MCL
BASOPHILS NFR BLD AUTO: 0.7 %
BILIRUB SERPL-MCNC: 0.7 MG/DL
BILIRUB UR QL STRIP.AUTO: NEGATIVE
BUN SERPL-MCNC: 11.3 MG/DL (ref 9.8–20.1)
CALCIUM SERPL-MCNC: 11.5 MG/DL (ref 8.4–10.2)
CHLORIDE SERPL-SCNC: 104 MMOL/L (ref 98–107)
CO2 SERPL-SCNC: 28 MMOL/L (ref 23–31)
COLOR UR AUTO: NORMAL
CREAT SERPL-MCNC: 1.04 MG/DL (ref 0.55–1.02)
EOSINOPHIL # BLD AUTO: 0.16 X10(3)/MCL (ref 0–0.9)
EOSINOPHIL NFR BLD AUTO: 2.6 %
ERYTHROCYTE [DISTWIDTH] IN BLOOD BY AUTOMATED COUNT: 13.2 % (ref 11.5–17)
GFR SERPLBLD CREATININE-BSD FMLA CKD-EPI: 57 MLS/MIN/1.73/M2
GLOBULIN SER-MCNC: 3.6 GM/DL (ref 2.4–3.5)
GLUCOSE SERPL-MCNC: 84 MG/DL (ref 82–115)
GLUCOSE UR QL STRIP.AUTO: NORMAL
HCT VFR BLD AUTO: 35 % (ref 37–47)
HGB BLD-MCNC: 11.9 G/DL (ref 12–16)
IMM GRANULOCYTES # BLD AUTO: 0.01 X10(3)/MCL (ref 0–0.04)
IMM GRANULOCYTES NFR BLD AUTO: 0.2 %
KETONES UR QL STRIP.AUTO: NEGATIVE
LEUKOCYTE ESTERASE UR QL STRIP.AUTO: NEGATIVE
LIPASE SERPL-CCNC: 38 U/L
LYMPHOCYTES # BLD AUTO: 1.1 X10(3)/MCL (ref 0.6–4.6)
LYMPHOCYTES NFR BLD AUTO: 18.2 %
MAGNESIUM SERPL-MCNC: 1.7 MG/DL (ref 1.6–2.6)
MCH RBC QN AUTO: 28.1 PG (ref 27–31)
MCHC RBC AUTO-ENTMCNC: 34 G/DL (ref 33–36)
MCV RBC AUTO: 82.5 FL (ref 80–94)
MONOCYTES # BLD AUTO: 0.75 X10(3)/MCL (ref 0.1–1.3)
MONOCYTES NFR BLD AUTO: 12.4 %
NEUTROPHILS # BLD AUTO: 3.98 X10(3)/MCL (ref 2.1–9.2)
NEUTROPHILS NFR BLD AUTO: 65.9 %
NITRITE UR QL STRIP.AUTO: NEGATIVE
NRBC BLD AUTO-RTO: 0 %
PH UR STRIP.AUTO: 6.5 [PH]
PLATELET # BLD AUTO: 427 X10(3)/MCL (ref 130–400)
PMV BLD AUTO: 9.8 FL (ref 7.4–10.4)
POTASSIUM SERPL-SCNC: 3.1 MMOL/L (ref 3.5–5.1)
PROT SERPL-MCNC: 7.2 GM/DL (ref 5.8–7.6)
PROT UR QL STRIP.AUTO: NEGATIVE
RBC # BLD AUTO: 4.24 X10(6)/MCL (ref 4.2–5.4)
RBC #/AREA URNS AUTO: NORMAL /HPF
RBC UR QL AUTO: NEGATIVE
SODIUM SERPL-SCNC: 143 MMOL/L (ref 136–145)
SP GR UR STRIP.AUTO: 1.01 (ref 1–1.03)
SQUAMOUS #/AREA URNS LPF: NORMAL /HPF
TROPONIN I SERPL-MCNC: 0.02 NG/ML (ref 0–0.04)
UROBILINOGEN UR STRIP-ACNC: NORMAL
WBC # SPEC AUTO: 6.04 X10(3)/MCL (ref 4.5–11.5)
WBC #/AREA URNS AUTO: NORMAL /HPF

## 2024-01-02 PROCEDURE — 81015 MICROSCOPIC EXAM OF URINE: CPT | Performed by: PHYSICIAN ASSISTANT

## 2024-01-02 PROCEDURE — 85025 COMPLETE CBC W/AUTO DIFF WBC: CPT | Performed by: PHYSICIAN ASSISTANT

## 2024-01-02 PROCEDURE — 83690 ASSAY OF LIPASE: CPT | Performed by: PHYSICIAN ASSISTANT

## 2024-01-02 PROCEDURE — 84484 ASSAY OF TROPONIN QUANT: CPT | Performed by: PHYSICIAN ASSISTANT

## 2024-01-02 PROCEDURE — 25500020 PHARM REV CODE 255: Performed by: NURSE PRACTITIONER

## 2024-01-02 PROCEDURE — 99285 EMERGENCY DEPT VISIT HI MDM: CPT | Mod: 25

## 2024-01-02 PROCEDURE — 25000003 PHARM REV CODE 250: Performed by: NURSE PRACTITIONER

## 2024-01-02 PROCEDURE — 93010 ELECTROCARDIOGRAM REPORT: CPT | Mod: ,,, | Performed by: INTERNAL MEDICINE

## 2024-01-02 PROCEDURE — 83735 ASSAY OF MAGNESIUM: CPT | Performed by: PHYSICIAN ASSISTANT

## 2024-01-02 PROCEDURE — 93005 ELECTROCARDIOGRAM TRACING: CPT

## 2024-01-02 PROCEDURE — 80053 COMPREHEN METABOLIC PANEL: CPT | Performed by: PHYSICIAN ASSISTANT

## 2024-01-02 PROCEDURE — 96360 HYDRATION IV INFUSION INIT: CPT | Mod: 59

## 2024-01-02 RX ORDER — SUCRALFATE 1 G/1
1 TABLET ORAL
Status: COMPLETED | OUTPATIENT
Start: 2024-01-02 | End: 2024-01-02

## 2024-01-02 RX ORDER — SUCRALFATE 1 G/10ML
1 SUSPENSION ORAL 4 TIMES DAILY
Qty: 400 ML | Refills: 0 | Status: SHIPPED | OUTPATIENT
Start: 2024-01-02 | End: 2024-01-12

## 2024-01-02 RX ORDER — SODIUM CHLORIDE 9 MG/ML
1000 INJECTION, SOLUTION INTRAVENOUS
Status: COMPLETED | OUTPATIENT
Start: 2024-01-02 | End: 2024-01-02

## 2024-01-02 RX ADMIN — IOPAMIDOL 100 ML: 755 INJECTION, SOLUTION INTRAVENOUS at 03:01

## 2024-01-02 RX ADMIN — SUCRALFATE 1 G: 1 TABLET ORAL at 04:01

## 2024-01-02 RX ADMIN — SODIUM CHLORIDE 1000 ML: 9 INJECTION, SOLUTION INTRAVENOUS at 03:01

## 2024-01-02 NOTE — FIRST PROVIDER EVALUATION
Medical screening examination initiated.  I have conducted a focused provider triage encounter, findings are as follows:    Brief history of present illness:  73-year-old female presents to ED for evaluation of epigastric pain since Thanksgiving.  Family reports that patient has not been able to keep down any food with diarrhea.  Denies nausea, vomiting, fever. Family reports losing weight.  Being worked up by GI but not improving.     There were no vitals filed for this visit.    Pertinent physical exam:  Patient awake and alert sitting in wheelchair.    Brief workup plan:  labs, EKG, IVF    Preliminary workup initiated; this workup will be continued and followed by the physician or advanced practice provider that is assigned to the patient when roomed.

## 2024-01-02 NOTE — ED PROVIDER NOTES
"Encounter Date: 1/2/2024       History     Chief Complaint   Patient presents with    Abdominal Pain     Pt c/o epigastric pain that's been going on since thanksgiving. Did barium enema dx w/ GERD. Pt also reports that the food is "going straight through her" and is losing weight. Pt denies N/V and fever.      See MDM    The history is provided by the patient. No  was used.     Review of patient's allergies indicates:   Allergen Reactions    No known drug allergies      Past Medical History:   Diagnosis Date    Abdominal pain, LLQ     Abdominal pain, RLQ     Acute bilateral low back pain with left-sided sciatica     Anxiety     Cardiac microvascular disease     Carotid bruit     Constipation, unspecified     COPD (chronic obstructive pulmonary disease)     Depressive disorder     Diverticulitis     Dyslipidemia     Fungal infection of lung     GERD (gastroesophageal reflux disease), chronic     Heart valve disorder     Iron deficiency anemia     Moderate aortic regurgitation     Primary hypertension     PTCA with stent insertion     Shortness of breath     Status post laser ablation of incompetent vein     Thyroid enlarged     Varicose veins of bilateral lower extremities with other complications     Venous insufficiency      Past Surgical History:   Procedure Laterality Date    COLONOSCOPY N/A 2/6/2023    Procedure: COLON;  Surgeon: Dorothea Davey III, MD;  Location: [facility] ENDOSCOPY;  Service: Gastroenterology;  Laterality: N/A;    COLONOSCOPY, WITH POLYPECTOMY USING SNARE  2/6/2023    Procedure: COLONOSCOPY, WITH POLYPECTOMY USING SNARE;  Surgeon: Dorothea Davey III, MD;  Location: [facility] ENDOSCOPY;  Service: Gastroenterology;;    EGD, WITH CLOSED BIOPSY N/A 3/7/2023    Procedure: EGD;  Surgeon: Dorothea Davey III, MD;  Location: [facility] ENDOSCOPY;  Service: Gastroenterology;  Laterality: N/A;  Pre OP packet for procedure was mailed to patient     Family History   Problem Relation Age " of Onset    Heart attack Mother     COPD Father         sepsis    No Known Problems Brother      Social History     Tobacco Use    Smoking status: Never     Passive exposure: Never    Smokeless tobacco: Never   Substance Use Topics    Alcohol use: Never    Drug use: Never     Review of Systems   Constitutional:  Negative for fever.   Gastrointestinal:  Positive for abdominal pain (epigastric) and diarrhea. Negative for nausea and vomiting.   All other systems reviewed and are negative.      Physical Exam     Initial Vitals [01/02/24 1102]   BP Pulse Resp Temp SpO2   (!) 144/68 78 19 98.2 °F (36.8 °C) 96 %      MAP       --         Physical Exam    Nursing note and vitals reviewed.  Constitutional: She appears well-developed and well-nourished.   Cardiovascular:  Normal rate and regular rhythm.           Murmur heard.  Abdominal: Abdomen is soft. Bowel sounds are normal. She exhibits no distension. There is abdominal tenderness (epigastric area).   Musculoskeletal:         General: Normal range of motion.     Neurological: She is alert and oriented to person, place, and time.   Skin: Skin is warm and dry.   Psychiatric: She has a normal mood and affect.         ED Course   Procedures  Labs Reviewed   COMPREHENSIVE METABOLIC PANEL - Abnormal; Notable for the following components:       Result Value    Potassium Level 3.1 (*)     Creatinine 1.04 (*)     Calcium Level Total 11.5 (*)     Globulin 3.6 (*)     Albumin/Globulin Ratio 1.0 (*)     All other components within normal limits   CBC WITH DIFFERENTIAL - Abnormal; Notable for the following components:    Hgb 11.9 (*)     Hct 35.0 (*)     Platelet 427 (*)     All other components within normal limits   LIPASE - Normal   MAGNESIUM - Normal   TROPONIN I - Normal   URINALYSIS, REFLEX TO URINE CULTURE - Normal   CBC W/ AUTO DIFFERENTIAL    Narrative:     The following orders were created for panel order CBC auto differential.  Procedure                                Abnormality         Status                     ---------                               -----------         ------                     CBC with Differential[[id number]]       Abnormal            Final result                 Please view results for these tests on the individual orders.     EKG Readings: (Independently Interpreted)   Initial Reading: No STEMI. Previous EKG Date: 2/2/21. Rhythm: Normal Sinus Rhythm. Heart Rate: 81. Conduction: RBBB. Clinical Impression: Left Ventricular Hypertrophy (LDH) with RBBB       Imaging Results              CT Abdomen Pelvis With IV Contrast NO Oral Contrast (Final result)  Result time 01/02/24 15:37:27      Final result by Tomasa Taylor MD (01/02/24 15:37:27)                   Narrative:    EXAMINATION:  CT ABDOMEN PELVIS WITH IV CONTRAST    CLINICAL HISTORY:  Abdominal pain, acute, nonlocalized;    TECHNIQUE:  CT imaging was performed of the abdomen and pelvis after the administration of intravenous contrast. Dose length product is 389 mGycm. Automatic exposure control, adjustment of mA/kV or iterative reconstruction technique was used to limit radiation dose.    COMPARISON:  CT abdomen pelvis dated 10/24/2023    FINDINGS:  Liver: No acute abnormality.    Gallbladder and biliary tree: No calcified gallstones. No intra or extrahepatic biliary ductal dilation.    Pancreas: Normal.    Spleen: Normal.    Adrenals: Normal.    Kidneys and ureters: No hydronephrosis.    Bladder: Normal.    Reproductive organs: The uterus has been surgically resected.    Stomach/bowel: There is colonic diverticulosis without inflammatory changes.  There is no bowel obstruction.  The appendix is not visualized.    Lymph nodes: No pathologically enlarged lymph node identified.    Peritoneum: No ascites or free air. No fluid collection.    Vessels: There are moderate vascular calcifications.    Abdominal wall: Normal.    Lung bases: No consolidation or pleural effusion.    Bones: No acute osseous  findings.      Electronically signed by: Tomasa Taylor  Date:    01/02/2024  Time:    15:37                                     Medications   0.9%  NaCl infusion (1,000 mLs Intravenous New Bag 1/2/24 1502)   iopamidoL (ISOVUE-370) injection 100 mL (100 mLs Intravenous Given 1/2/24 1530)   sucralfate tablet 1 g (1 g Oral Given 1/2/24 1603)     Medical Decision Making  74 y/o female presents with continuing abdominal pain (epigastric area) with diarrhea. No vomiting. States she hasn't had fever. She states that her abdominal pain really worsened around thanksgiving. She did have some right side abdominal pain back in October but her CT neg of her abdomen/pelvis at that time. She states she had been dealing with depression since her  passed in July. She was on several different medicines to try to help with this. She is on nexium and pepcid now as well (couple of weeks) but was on protonix prior to this. She had barium swallow screen and NM scan of her gallbladder. The barium swallow showed GERD. NM showed normal gallbladder and cbd. She states everything goes straight through her and when she eats it causes worsening pain. She doesn't have appt with dr. Davey yet again and was told it wouldn't be before the 18th.     Labs show mild hypokalemia and mildly elevated calcium. No leukocytosis. Mild anemia. Lipase normal. LFTs normal. UA has n ketones or infection. Mag neg. Trop neg.     CT shows no acute findings. Plan will be to start on carafate and nausea meds. Encourage close f/u with GI. Continue nexium and pepcid.     Amount and/or Complexity of Data Reviewed  External Data Reviewed: radiology.     Details: Reviewed barium swallow 12/19/23 which showed gerd. Reviewed NM December 2023 and showed normal GB function and patent CBD. CT back in October was neg for acute findings.   Labs:  Decision-making details documented in ED Course.  Radiology: ordered. Decision-making details documented in ED  Course.  ECG/medicine tests: independent interpretation performed. Decision-making details documented in ED Course.    Risk  Prescription drug management.      Additional MDM:   Differential Diagnosis:   Other: The following diagnoses were also considered and will be evaluated: GERD, gastric ulcer and dehydration.                                   Clinical Impression:  Final diagnoses:  [R10.13] Epigastric pain  [R10.13] Epigastric abdominal pain (Primary)          ED Disposition Condition    Discharge Stable          ED Prescriptions       Medication Sig Dispense Start Date End Date Auth. Provider    sucralfate (CARAFATE) 100 mg/mL suspension Take 10 mLs (1 g total) by mouth 4 (four) times daily. for 10 days 400 mL 1/2/2024 1/12/2024 Eveline Adame FNP          Follow-up Information       Follow up With Specialties Details Why Contact Info    Chuck Estrella MD Family Medicine   [address] [place] [address].  [place] [address]  [phone]      Dorothea Davey III, MD Gastroenterology Call   [address] [facility]  [address]  [place] [address]  [phone]               Eveline Adame FNP  01/02/24 6492

## 2024-01-02 NOTE — DISCHARGE INSTRUCTIONS
Carafate about 30 minutes prior to eating and prior to bed. Continue with nexium and pepcid. [place] diet. Try to get some nutrition in. Keep follow up with dr. verdugo

## 2024-01-17 DIAGNOSIS — R10.13 EPIGASTRIC PAIN: Primary | ICD-10-CM

## 2024-01-17 DIAGNOSIS — R14.0 ABDOMINAL DISTENTION: ICD-10-CM

## 2024-01-19 DIAGNOSIS — J44.9 CHRONIC OBSTRUCTIVE PULMONARY DISEASE, UNSPECIFIED COPD TYPE: Primary | ICD-10-CM

## 2024-01-19 RX ORDER — UMECLIDINIUM BROMIDE AND VILANTEROL TRIFENATATE 62.5; 25 UG/1; UG/1
1 POWDER RESPIRATORY (INHALATION) DAILY
Qty: 60 EACH | Refills: 11 | Status: SHIPPED | OUTPATIENT
Start: 2024-01-19

## 2024-02-02 ENCOUNTER — OFFICE VISIT (OUTPATIENT)
Dept: PRIMARY CARE CLINIC | Facility: CLINIC | Age: 74
End: 2024-02-02
Payer: MEDICARE

## 2024-02-02 VITALS
SYSTOLIC BLOOD PRESSURE: 155 MMHG | HEART RATE: 109 BPM | TEMPERATURE: 98 F | BODY MASS INDEX: 18.21 KG/M2 | OXYGEN SATURATION: 93 % | DIASTOLIC BLOOD PRESSURE: 69 MMHG | HEIGHT: 63 IN | WEIGHT: 102.81 LBS

## 2024-02-02 DIAGNOSIS — F32.A DEPRESSIVE DISORDER: Primary | ICD-10-CM

## 2024-02-02 DIAGNOSIS — F41.9 ANXIETY: ICD-10-CM

## 2024-02-02 PROBLEM — N18.31 CHRONIC KIDNEY DISEASE, STAGE 3A: Status: ACTIVE | Noted: 2024-02-02

## 2024-02-02 PROBLEM — I20.9 ANGINA PECTORIS, UNSPECIFIED: Status: ACTIVE | Noted: 2024-02-02

## 2024-02-02 PROBLEM — I70.0 ATHEROSCLEROSIS OF AORTA: Status: ACTIVE | Noted: 2024-02-02

## 2024-02-02 PROCEDURE — 99213 OFFICE O/P EST LOW 20 MIN: CPT | Mod: ,,, | Performed by: FAMILY MEDICINE

## 2024-02-02 RX ORDER — HYOSCYAMINE SULFATE 0.12 MG/1
60 TABLET SUBLINGUAL
COMMUNITY
Start: 2024-01-03

## 2024-02-02 RX ORDER — SUCRALFATE 1 G/1
90 TABLET ORAL
COMMUNITY
Start: 2023-12-06

## 2024-02-02 NOTE — PROGRESS NOTES
.Depression (Discuss increasing medications)         HPI:    Patient presents for recheck of anxiety and depression.    She has been feeling   Her mood has been not doing well.  She has been sad and depressed.  She has crying spells at times.  She is anxious at times.  She denies any harmful or suicidal thoughts.  She denies any feelings of helplessness or hopelessness.  She has been sleeping well.  Her appetite has been improving.    Current Outpatient Medications:     albuterol (PROVENTIL/VENTOLIN HFA) 90 mcg/actuation inhaler, Inhale 1-2 puffs into the lungs every 6 (six) hours as needed for Wheezing. Rescue, Disp: 27 g, Rfl: 11    albuterol-ipratropium (DUO-NEB) 2.5 mg-0.5 mg/3 mL nebulizer solution, USE 1 AMPULE IN NEBULIZER ONCE DAILY FOLLOWING AMIKACIN NEBULIZATION, Disp: 75 mL, Rfl: 11    alendronate (FOSAMAX) 70 MG tablet, TAKE 1 TABLET BY MOUTH ONCE A WEEK ON  EVERY  TUESDAY, Disp: 12 tablet, Rfl: 1    ALPRAZolam (XANAX) 0.5 MG tablet, TAKE 1 TABLET BY MOUTH THREE TIMES DAILY AS NEEDED FOR ANXIETY, Disp: 270 tablet, Rfl: 1    amLODIPine (NORVASC) 10 MG tablet, Take 10 mg by mouth., Disp: , Rfl:     aspirin (ECOTRIN) 81 MG EC tablet, tablet, Disp: , Rfl:     atorvastatin (LIPITOR) 80 MG tablet, Take 80 mg by mouth., Disp: , Rfl:     azithromycin (Z-AMANDO) 250 MG tablet, Take 1 tablet (250 mg total) by mouth every Mon, Wed, Fri., Disp: 36 tablet, Rfl: 11    azithromycin (Z-AMANDO) 250 MG tablet, 3 (three) times a week., Disp: , Rfl:     Bifidobacterium infantis (ALIGN) 4 mg Cap, capsule, Disp: , Rfl:     cyclobenzaprine (FLEXERIL) 10 MG tablet, Take 1 tablet (10 mg total) by mouth every evening., Disp: 30 tablet, Rfl: 5    dicyclomine (BENTYL) 20 mg tablet, Take 1 tablet by mouth once daily., Disp: , Rfl:     diltiaZEM (CARDIZEM CD) 120 MG Cp24, Take 120 mg by mouth., Disp: , Rfl:     diphenoxylate-atropine 2.5-0.025 mg (LOMOTIL) 2.5-0.025 mg per tablet, Take 1 tablet by mouth 3 (three) times daily as needed.,  Disp: , Rfl:     DULoxetine (CYMBALTA) 30 MG capsule, Take 1 capsule (30 mg total) by mouth once daily., Disp: 90 capsule, Rfl: 0    DULoxetine (CYMBALTA) 60 MG capsule, Take 1 capsule by mouth once daily, Disp: 90 capsule, Rfl: 1    ergocalciferol (ERGOCALCIFEROL) 50,000 unit Cap, Take 1 capsule by mouth once a week, Disp: 12 capsule, Rfl: 3    estradiol 0.025 mg/24 hr 0.025 mg/24 hr, APPLY 1 PATCH TOPICALLY ONCE A WEEK EVERY  FRIDAY., Disp: 12 patch, Rfl: 0    famotidine (PEPCID) 40 MG tablet, Take 40 mg by mouth every evening., Disp: , Rfl:     fluticasone-umeclidin-vilanter (TRELEGY ELLIPTA) 100-62.5-25 mcg DsDv, Inhale 1 puff into the lungs once daily., Disp: 60 each, Rfl: 11    hyoscyamine (LEVSIN/SL) 0.125 mg Subl, 60 tablets., Disp: , Rfl:     iron bis-gly/FA/C/B12/Ca/succ (IRON-150 ORAL), Take by mouth., Disp: , Rfl:     L.acidophilus-B.bifidum,longum 150 mg (3 billion cell) Chew, , Disp: , Rfl:     mirtazapine (REMERON) 45 MG tablet, Take 1 tablet (45 mg total) by mouth every evening., Disp: 30 tablet, Rfl: 5    mupirocin (BACTROBAN) 2 % ointment, APPLY OINTMENT TOPICALLY TWICE DAILY TO SORES, Disp: , Rfl:     ondansetron (ZOFRAN) 8 MG tablet, Take 8 mg by mouth every 8 (eight) hours as needed., Disp: , Rfl:     ondansetron (ZOFRAN-ODT) 8 MG TbDL, DISSOLVE 1 TABLET IN MOUTH EVERY 6 HOURS AS NEEDED FOR NAUSEA, Disp: , Rfl:     pantoprazole (PROTONIX) 40 MG tablet, TAKE 1 TABLET BY MOUTH TWICE DAILY TAKE AT LEAST 30 MINUTES BEFORE MEAL BREAKFAST AND EVENINGS MEALS, Disp: , Rfl:     roflumilast (DALIRESP) 500 mcg Tab, Take 1 tablet (500 mcg total) by mouth once daily., Disp: 30 tablet, Rfl: 11    sodium chloride 3% 3 % nebulizer solution, USE 4 ML IN NEBULIZER ONCE DAILY, Disp: , Rfl:     sucralfate (CARAFATE) 1 gram tablet, 90 tablets., Disp: , Rfl:     temazepam (RESTORIL) 30 mg capsule, TAKE 1 CAPSULE BY MOUTH NIGHTLY AS NEEDED FOR INSOMNIA, Disp: 30 capsule, Rfl: 5    umeclidinium-vilanteroL (ANORO  ELLIPTA) 62.5-25 mcg/actuation DsDv, Inhale 1 puff into the lungs once daily. Controller, Disp: 60 each, Rfl: 11    valsartan (DIOVAN) 160 MG tablet, Take 160 mg by mouth once daily., Disp: , Rfl:       ROS:    She continues to have GI issues; she sees Dr. Davey.  She has an appointment with a pulmonologist in [place] next week.      PE:    ..  Vitals:    02/02/24 1107   BP: (!) 155/69   Pulse: 109   Temp: 97.9 °F (36.6 °C)        General:  She is a well-developed well-nourished elderly white female in no apparent distress.  She is alert and oriented.  She has a depressed affect.  She interacts appropriately.  She is appropriately dressed and groomed.        1. Depressive disorder  Overview:  Doing well overall; she continues to have stress secondary to health issues and 's dementia.  Continue current medications.    08/29/2023: Patient has had increased depression since her 's passing 1 month ago.  She is also grieving.  She reports sadness, depression crying spells.    She has problems with staying asleep.    She has no appetite and has lost 15 lb.  We will increase her mirtazapine to 45 mg daily.  Continue Cymbalta and Abilify.  ER precautions given   Follow-up in 1 month.    10/26/2023: Patient continues to be depressed.  She is only eating 1 meal a day.  She has a lack of appetite.  She does not feel Abilify is helpful.    Will discontinue Abilify.    Start Rexulti 1 mg daily.  Follow-up office visit in 1 month.    ER precautions given.    11/27/2023: Patient states her depression is improving.  She is now eating 2 meals a day; however, she has lost 5 more lb.  She is sleeping well.    She has less crying spells and feels sad less often.    Increase Rexulti to 2 mg daily.    Continue mirtazapine 45 mg daily; refills sent.    Continue Cymbalta.  Follow-up office visit in 1 month.    ER precautions given.    12/13/2023: Patient is still grieving loss of her .    She discontinued Rexulti as  she did not feel it was helping; she was concerned it was upsetting her stomach.    We will increase his Cymbalta to 90 mg daily.  She denies any suicidal or harmful thoughts; ER precautions given.    Follow-up office visit in one-month.    02/02/2024:  Patient continues to report depression, sadness and crying spells.  She would like to get off of Cymbalta; patient advised that we will need to taper her off this medication.  She is to decrease her dosage to 60 mg daily.  She would like to resume Rexulti; this medication was not affecting her stomach.  Resume 2 mg daily.  She denies any harmful or suicidal thoughts; ER precautions given.  She is to call with an update in 2-3 weeks.    Follow-up office visit in 6 weeks.      2. Anxiety  Overview:  Obviously, her anxiety has been increased with loss of her .    Continue current medications.    10/26/2023: Her anxiety continues to be increased.  Will discontinue Abilify secondary to lack of efficacy.    Start Rexulti 1 mg daily.    12/13/2023:  Patient continues to report anxiety; she is always worrying.    Will increase Cymbalta to a total of 90 mg daily.    Prescription for 30 mg dosage sent to take along with her existing 60 mg dosage.  Patient discontinued Rexulti due to proceed stomach upset.  Follow-up office visit in one-month.    02/02/2024:  See ' Depressive disorder. '                ..Follow up in about 6 weeks (around 3/15/2024) for Recheck anxiety/depression.

## 2024-02-05 ENCOUNTER — TELEPHONE (OUTPATIENT)
Dept: PRIMARY CARE CLINIC | Facility: CLINIC | Age: 74
End: 2024-02-05
Payer: MEDICARE

## 2024-02-05 NOTE — TELEPHONE ENCOUNTER
----- Message from Chuck Estrella MD sent at 2/2/2024 12:53 PM CST -----  Her screening mammogram is negative.    We will repeat in 1 year.

## 2024-02-07 DIAGNOSIS — J45.20 MILD INTERMITTENT ASTHMA, UNCOMPLICATED: ICD-10-CM

## 2024-02-07 DIAGNOSIS — A31.0 MYCOBACTERIUM AVIUM COMPLEX: Primary | ICD-10-CM

## 2024-02-07 DIAGNOSIS — R91.1 SOLITARY PULMONARY NODULE: ICD-10-CM

## 2024-02-07 DIAGNOSIS — J47.9 BRONCHIECTASIS WITHOUT COMPLICATION: ICD-10-CM

## 2024-02-09 ENCOUNTER — LAB VISIT (OUTPATIENT)
Dept: LAB | Facility: HOSPITAL | Age: 74
End: 2024-02-09
Attending: STUDENT IN AN ORGANIZED HEALTH CARE EDUCATION/TRAINING PROGRAM
Payer: MEDICARE

## 2024-02-09 ENCOUNTER — TELEPHONE (OUTPATIENT)
Dept: PRIMARY CARE CLINIC | Facility: CLINIC | Age: 74
End: 2024-02-09
Payer: MEDICARE

## 2024-02-09 DIAGNOSIS — J47.9 BRONCHIECTASIS WITHOUT COMPLICATION: ICD-10-CM

## 2024-02-09 DIAGNOSIS — J45.20 MILD INTERMITTENT ASTHMA, UNCOMPLICATED: ICD-10-CM

## 2024-02-09 DIAGNOSIS — A31.0 MYCOBACTERIUM AVIUM COMPLEX: ICD-10-CM

## 2024-02-09 LAB
BASOPHILS # BLD AUTO: 0.04 X10(3)/MCL
BASOPHILS NFR BLD AUTO: 0.7 %
EOSINOPHIL # BLD AUTO: 0.17 X10(3)/MCL (ref 0–0.9)
EOSINOPHIL NFR BLD AUTO: 2.8 %
ERYTHROCYTE [DISTWIDTH] IN BLOOD BY AUTOMATED COUNT: 13.2 % (ref 11.5–17)
HCT VFR BLD AUTO: 36.4 % (ref 37–47)
HGB BLD-MCNC: 11.5 G/DL (ref 12–16)
IMM GRANULOCYTES # BLD AUTO: 0.01 X10(3)/MCL (ref 0–0.04)
IMM GRANULOCYTES NFR BLD AUTO: 0.2 %
LYMPHOCYTES # BLD AUTO: 1.08 X10(3)/MCL (ref 0.6–4.6)
LYMPHOCYTES NFR BLD AUTO: 17.6 %
MCH RBC QN AUTO: 27.5 PG (ref 27–31)
MCHC RBC AUTO-ENTMCNC: 31.6 G/DL (ref 33–36)
MCV RBC AUTO: 87.1 FL (ref 80–94)
MONOCYTES # BLD AUTO: 0.75 X10(3)/MCL (ref 0.1–1.3)
MONOCYTES NFR BLD AUTO: 12.2 %
NEUTROPHILS # BLD AUTO: 4.08 X10(3)/MCL (ref 2.1–9.2)
NEUTROPHILS NFR BLD AUTO: 66.5 %
NRBC BLD AUTO-RTO: 0 %
PLATELET # BLD AUTO: 387 X10(3)/MCL (ref 130–400)
PMV BLD AUTO: 9.5 FL (ref 7.4–10.4)
RBC # BLD AUTO: 4.18 X10(6)/MCL (ref 4.2–5.4)
WBC # SPEC AUTO: 6.13 X10(3)/MCL (ref 4.5–11.5)

## 2024-02-09 PROCEDURE — 86225 DNA ANTIBODY NATIVE: CPT

## 2024-02-09 PROCEDURE — 86003 ALLG SPEC IGE CRUDE XTRC EA: CPT

## 2024-02-09 PROCEDURE — 36415 COLL VENOUS BLD VENIPUNCTURE: CPT

## 2024-02-09 PROCEDURE — 87206 SMEAR FLUORESCENT/ACID STAI: CPT

## 2024-02-09 PROCEDURE — 87070 CULTURE OTHR SPECIMN AEROBIC: CPT

## 2024-02-09 PROCEDURE — 85025 COMPLETE CBC W/AUTO DIFF WBC: CPT

## 2024-02-09 PROCEDURE — 86200 CCP ANTIBODY: CPT

## 2024-02-09 PROCEDURE — 87205 SMEAR GRAM STAIN: CPT

## 2024-02-09 PROCEDURE — 86431 RHEUMATOID FACTOR QUANT: CPT

## 2024-02-09 PROCEDURE — 87116 MYCOBACTERIA CULTURE: CPT

## 2024-02-09 NOTE — TELEPHONE ENCOUNTER
----- Message from Alessia Patel sent at 2/9/2024  8:57 AM CST -----  Regarding: update  .Type:  Needs Medical Advice    Who Called: pt  Would the patient rather a call back or a response via [facility]? sue  Best Call Back Number:  [phone]  Additional Information: update on medical condition

## 2024-02-09 NOTE — TELEPHONE ENCOUNTER
Pt stated she start Rexulti and it is not helping. Pt had visit with Kristine Reyes MD. PT has lung infection and is under weight at 103 lbs. Pt is requesting a medication for her anxiety and depression and to help her gain weight. Please advise. Thanks

## 2024-02-09 NOTE — LETTER
AUTHORIZATION FOR RELEASE OF   CONFIDENTIAL INFORMATION    Dear Dr. Reyes,    We are seeing Alesia Walden, date of birth 1950, in the clinic at [facility]. Chuck Estrella MD is the patient's PCP. Alesia Walden has an outstanding lab/procedure at the time we reviewed her chart. In order to help keep her health information updated, she has authorized us to request the following medical record(s):        (  )  MAMMOGRAM                                      (  )  COLONOSCOPY      (  )  PAP SMEAR                                          (  )  OUTSIDE LAB RESULTS     (  )  DEXA SCAN                                          (  )  EYE EXAM            (  )  FOOT EXAM                                          (  )  ENTIRE RECORD     (  )  OUTSIDE IMMUNIZATIONS                 ( X )  LAST OFFICE NOTE         Please fax records to Chuck Estrella MD, [phone]     If you have any questions, please contact Prudence Manriquez MA at [phone].           Patient Name: Alesia Walden  : 1950  Patient Phone #: [phone]

## 2024-02-10 LAB
GRAM STN SPEC: NORMAL
GRAM STN SPEC: NORMAL

## 2024-02-11 LAB
BACTERIA SPEC CULT: NORMAL
RHODAMINE-AURAMINE STN SPEC: NORMAL

## 2024-02-14 LAB
ANTINUCLEAR ANTIBODY SCREEN (OHS): NEGATIVE
CYCLIC CITRULLINATED PEPTIDE (CCP) (OHS): 0.4 U/ML
DSDNA AB QUANT (OHS): <0.6 IU/ML

## 2024-02-16 LAB
ALLERGEN ASPERGILLUS FUMIGATUS IGE (OLG): <0.1 KUA/L
PHADIOTOP IGE QN: 76.7 KU/L

## 2024-02-20 LAB — RHEUMATOID FACT SERPL-ACNC: <10 IU/ML

## 2024-02-23 ENCOUNTER — TELEPHONE (OUTPATIENT)
Dept: PRIMARY CARE CLINIC | Facility: CLINIC | Age: 74
End: 2024-02-23
Payer: MEDICARE

## 2024-02-23 NOTE — TELEPHONE ENCOUNTER
----- Message from Gisselle Bergman sent at 2/23/2024 11:16 AM CST -----  Regarding: return call  Type:  Patient Returning Call    Who Called: pt      Does the patient know what this is regarding?: questions of concern (medication)    Would the patient rather a call back or a response via [facility]?  Yes    Best Call Back Number: [phone]    Additional Information:   pt called in regards to questions of concern if she could take (xanax with rexulti), please advise, thanks

## 2024-02-28 NOTE — PROGRESS NOTES
.Follow-up (6 week f/u)         HPI:    Patient presents for 6 week recheck of depression and anxiety.  At her last office visit, we decrease her Cymbalta to 60 mg daily and we resumed her resulting in 2 mg daily.  She is very anxious secondary to recent findings on CT with subsequent testing.   She is not eating well and has lost 3 #.  She has been sleeping well overall.    Current Outpatient Medications:     albuterol (PROVENTIL/VENTOLIN HFA) 90 mcg/actuation inhaler, Inhale 1-2 puffs into the lungs every 6 (six) hours as needed for Wheezing. Rescue, Disp: 27 g, Rfl: 11    albuterol-ipratropium (DUO-NEB) 2.5 mg-0.5 mg/3 mL nebulizer solution, USE 1 AMPULE IN NEBULIZER ONCE DAILY FOLLOWING AMIKACIN NEBULIZATION, Disp: 75 mL, Rfl: 11    alendronate (FOSAMAX) 70 MG tablet, TAKE 1 TABLET BY MOUTH ONCE A WEEK ON  EVERY  TUESDAY, Disp: 12 tablet, Rfl: 1    ALPRAZolam (XANAX) 0.5 MG tablet, TAKE 1 TABLET BY MOUTH THREE TIMES DAILY AS NEEDED FOR ANXIETY, Disp: 270 tablet, Rfl: 1    amLODIPine (NORVASC) 10 MG tablet, Take 10 mg by mouth., Disp: , Rfl:     aspirin (ECOTRIN) 81 MG EC tablet, tablet, Disp: , Rfl:     atorvastatin (LIPITOR) 80 MG tablet, Take 80 mg by mouth., Disp: , Rfl:     azithromycin (Z-AMANDO) 250 MG tablet, Take 1 tablet (250 mg total) by mouth every Mon, Wed, Fri., Disp: 36 tablet, Rfl: 11    azithromycin (Z-AMANDO) 250 MG tablet, 3 (three) times a week., Disp: , Rfl:     diltiaZEM (CARDIZEM CD) 120 MG Cp24, Take 120 mg by mouth., Disp: , Rfl:     diphenoxylate-atropine 2.5-0.025 mg (LOMOTIL) 2.5-0.025 mg per tablet, Take 1 tablet by mouth 3 (three) times daily as needed., Disp: , Rfl:     DULoxetine (CYMBALTA) 60 MG capsule, Take 1 capsule by mouth once daily, Disp: 90 capsule, Rfl: 0    ergocalciferol (ERGOCALCIFEROL) 50,000 unit Cap, Take 1 capsule by mouth once a week, Disp: 12 capsule, Rfl: 3    estradiol 0.025 mg/24 hr 0.025 mg/24 hr, APPLY 1 PATCH TOPICALLY ONCE A WEEK EVERY  FRIDAY., Disp: 12 patch,  Rfl: 0    famotidine (PEPCID) 40 MG tablet, Take 40 mg by mouth every evening., Disp: , Rfl:     fluticasone-umeclidin-vilanter (TRELEGY ELLIPTA) 100-62.5-25 mcg DsDv, Inhale 1 puff into the lungs once daily., Disp: 60 each, Rfl: 11    hyoscyamine (LEVSIN/SL) 0.125 mg Subl, 60 tablets., Disp: , Rfl:     L.acidophilus-B.bifidum,longum 150 mg (3 billion cell) Chew, , Disp: , Rfl:     mupirocin (BACTROBAN) 2 % ointment, APPLY OINTMENT TOPICALLY TWICE DAILY TO SORES, Disp: , Rfl:     ondansetron (ZOFRAN) 8 MG tablet, Take 8 mg by mouth every 8 (eight) hours as needed., Disp: , Rfl:     ondansetron (ZOFRAN-ODT) 8 MG TbDL, DISSOLVE 1 TABLET IN MOUTH EVERY 6 HOURS AS NEEDED FOR NAUSEA, Disp: , Rfl:     pantoprazole (PROTONIX) 40 MG tablet, TAKE 1 TABLET BY MOUTH TWICE DAILY TAKE AT LEAST 30 MINUTES BEFORE MEAL BREAKFAST AND EVENINGS MEALS, Disp: , Rfl:     roflumilast (DALIRESP) 500 mcg Tab, Take 1 tablet (500 mcg total) by mouth once daily., Disp: 30 tablet, Rfl: 11    sodium chloride 3% 3 % nebulizer solution, Inhale 4 mLs into the lungs 2 (two) times daily as needed., Disp: , Rfl:     sucralfate (CARAFATE) 1 gram tablet, 90 tablets., Disp: , Rfl:     temazepam (RESTORIL) 30 mg capsule, TAKE 1 CAPSULE BY MOUTH NIGHTLY AS NEEDED FOR INSOMNIA, Disp: 30 capsule, Rfl: 5    umeclidinium-vilanteroL (ANORO ELLIPTA) 62.5-25 mcg/actuation DsDv, Inhale 1 puff into the lungs once daily. Controller, Disp: 60 each, Rfl: 11    valsartan (DIOVAN) 160 MG tablet, Take 160 mg by mouth once daily., Disp: , Rfl:     Bifidobacterium infantis (ALIGN) 4 mg Cap, capsule, Disp: , Rfl:     cyclobenzaprine (FLEXERIL) 10 MG tablet, Take 1 tablet (10 mg total) by mouth every evening., Disp: 30 tablet, Rfl: 5    dicyclomine (BENTYL) 20 mg tablet, Take 1 tablet by mouth once daily., Disp: , Rfl:     iron bis-gly/FA/C/B12/Ca/succ (IRON-150 ORAL), Take by mouth., Disp: , Rfl:     omeprazole (PRILOSEC) 40 MG capsule, Take 40 mg by mouth before  breakfast., Disp: , Rfl:     sodium chloride 3% 3 % nebulizer solution, USE 4 ML IN NEBULIZER ONCE DAILY, Disp: , Rfl:     vortioxetine (TRINTELLIX) 10 mg Tab, Take 1 tablet by mouth daily., Disp: 30 tablet, Rfl: 1      ROS:    She did her 6 mo chest CT in November; Dr Cortés had her see her pulmonologist in [place], Dr Reyes. She ordered another CT and blood work. Her CT is next week.      PE:    ..  Vitals:    03/01/24 0838   BP: (!) 140/70   Pulse: 81   Temp: 97.8 °F (36.6 °C)        General:  She is well-developed well-nourished elderly white female in no apparent distress.  She is alert and oriented.  She is anxious and depressed.  She answers questions appropriately.  She is appropriately dressed and groomed.        1. Depressive disorder  Overview:  Doing well overall; she continues to have stress secondary to health issues and 's dementia.  Continue current medications.    08/29/2023: Patient has had increased depression since her 's passing 1 month ago.  She is also grieving.  She reports sadness, depression crying spells.    She has problems with staying asleep.    She has no appetite and has lost 15 lb.  We will increase her mirtazapine to 45 mg daily.  Continue Cymbalta and Abilify.  ER precautions given   Follow-up in 1 month.    10/26/2023: Patient continues to be depressed.  She is only eating 1 meal a day.  She has a lack of appetite.  She does not feel Abilify is helpful.    Will discontinue Abilify.    Start Rexulti 1 mg daily.  Follow-up office visit in 1 month.    ER precautions given.    11/27/2023: Patient states her depression is improving.  She is now eating 2 meals a day; however, she has lost 5 more lb.  She is sleeping well.    She has less crying spells and feels sad less often.    Increase Rexulti to 2 mg daily.    Continue mirtazapine 45 mg daily; refills sent.    Continue Cymbalta.  Follow-up office visit in 1 month.    ER precautions given.    12/13/2023: Patient is  still grieving loss of her .    She discontinued Rexulti as she did not feel it was helping; she was concerned it was upsetting her stomach.    We will increase his Cymbalta to 90 mg daily.  She denies any suicidal or harmful thoughts; ER precautions given.    Follow-up office visit in one-month.    02/02/2024:  Patient continues to report depression, sadness and crying spells.  She would like to get off of Cymbalta; patient advised that we will need to taper her off this medication.  She is to decrease her dosage to 60 mg daily.  She would like to resume Rexulti; this medication was not affecting her stomach.  Resume 2 mg daily.  She denies any harmful or suicidal thoughts; ER precautions given.  She is to call with an update in 2-3 weeks.    Follow-up office visit in 6 weeks.    03/01/2024:  Patient has not notice any improvement on resulting; will discontinue.  Her daughter is on Trintellix and doing well.  Patient would like to try Trintellix.    Start Trintellix 10 mg daily; potential side effects discussed with patient.    Hold Cymbalta for now.  Patient denies any suicidal or harmful thoughts; ER precautions given.    Follow-up office visit in one-month.      2. Anxiety  Overview:  Obviously, her anxiety has been increased with loss of her .    Continue current medications.    10/26/2023: Her anxiety continues to be increased.  Will discontinue Abilify secondary to lack of efficacy.    Start Rexulti 1 mg daily.    12/13/2023:  Patient continues to report anxiety; she is always worrying.    Will increase Cymbalta to a total of 90 mg daily.    Prescription for 30 mg dosage sent to take along with her existing 60 mg dosage.  Patient discontinued Rexulti due to proceed stomach upset.  Follow-up office visit in one-month.    02/02/2024:  See ' Depressive disorder. '    03/01/2024:  Her anxiety has been increased with recent abnormal CT findings.      3. Angina pectoris,  unspecified  Overview:  Stable; followed by her cardiologist, Dr. Amaya.      4. Atherosclerosis of aorta  Overview:  Stable; followed by her cardiologist, Dr. Amaya.      Other orders  -     vortioxetine (TRINTELLIX) 10 mg Tab; Take 1 tablet by mouth daily.  Dispense: 30 tablet; Refill: 1              ..Follow up in about 1 month (around 4/1/2024) for Recheck depression/anxiety.

## 2024-03-01 ENCOUNTER — OFFICE VISIT (OUTPATIENT)
Dept: PRIMARY CARE CLINIC | Facility: CLINIC | Age: 74
End: 2024-03-01
Payer: MEDICARE

## 2024-03-01 VITALS
TEMPERATURE: 98 F | WEIGHT: 99.81 LBS | DIASTOLIC BLOOD PRESSURE: 70 MMHG | OXYGEN SATURATION: 93 % | HEART RATE: 81 BPM | SYSTOLIC BLOOD PRESSURE: 140 MMHG | BODY MASS INDEX: 17.68 KG/M2 | HEIGHT: 63 IN

## 2024-03-01 DIAGNOSIS — I70.0 ATHEROSCLEROSIS OF AORTA: ICD-10-CM

## 2024-03-01 DIAGNOSIS — F32.A DEPRESSIVE DISORDER: Primary | ICD-10-CM

## 2024-03-01 DIAGNOSIS — I20.9 ANGINA PECTORIS, UNSPECIFIED: ICD-10-CM

## 2024-03-01 DIAGNOSIS — F41.9 ANXIETY: ICD-10-CM

## 2024-03-01 PROBLEM — N18.31 CHRONIC KIDNEY DISEASE, STAGE 3A: Status: RESOLVED | Noted: 2024-02-02 | Resolved: 2024-03-01

## 2024-03-01 PROCEDURE — 99213 OFFICE O/P EST LOW 20 MIN: CPT | Mod: ,,, | Performed by: FAMILY MEDICINE

## 2024-03-01 RX ORDER — VORTIOXETINE 10 MG/1
TABLET, FILM COATED ORAL
Qty: 30 TABLET | Refills: 1 | Status: SHIPPED | OUTPATIENT
Start: 2024-03-01 | End: 2024-04-16 | Stop reason: SDUPTHER

## 2024-03-01 RX ORDER — SODIUM CHLORIDE FOR INHALATION 3 %
4 VIAL, NEBULIZER (ML) INHALATION 2 TIMES DAILY PRN
COMMUNITY
Start: 2024-02-07

## 2024-03-01 RX ORDER — OMEPRAZOLE 40 MG/1
40 CAPSULE, DELAYED RELEASE ORAL
COMMUNITY
Start: 2023-12-22

## 2024-03-07 ENCOUNTER — HOSPITAL ENCOUNTER (OUTPATIENT)
Dept: RADIOLOGY | Facility: HOSPITAL | Age: 74
Discharge: HOME | End: 2024-03-07
Attending: STUDENT IN AN ORGANIZED HEALTH CARE EDUCATION/TRAINING PROGRAM
Payer: MEDICARE

## 2024-03-07 DIAGNOSIS — R91.1 SOLITARY PULMONARY NODULE: ICD-10-CM

## 2024-03-07 PROCEDURE — 71250 CT THORAX DX C-: CPT | Mod: TC

## 2024-03-19 ENCOUNTER — PROCEDURE VISIT (OUTPATIENT)
Dept: RESPIRATORY THERAPY | Facility: HOSPITAL | Age: 74
End: 2024-03-19
Attending: STUDENT IN AN ORGANIZED HEALTH CARE EDUCATION/TRAINING PROGRAM
Payer: MEDICARE

## 2024-03-19 VITALS — HEART RATE: 72 BPM | OXYGEN SATURATION: 96 % | RESPIRATION RATE: 18 BRPM

## 2024-03-19 DIAGNOSIS — J47.9 BRONCHIECTASIS WITHOUT COMPLICATION: ICD-10-CM

## 2024-03-19 LAB
DLCO ADJ PRE: 11.71 ML/(MIN*MMHG) (ref 14.63–26.1)
DLCO SINGLE BREATH LLN: 14.63
DLCO SINGLE BREATH PRE REF: 57.5 %
DLCO SINGLE BREATH REF: 20.36
DLCOC SBVA LLN: 2.78
DLCOC SBVA PRE REF: 94.1 %
DLCOC SBVA REF: 4.27
DLCOC SINGLE BREATH LLN: 14.63
DLCOC SINGLE BREATH PRE REF: 57.5 %
DLCOC SINGLE BREATH REF: 20.36
DLCOVA LLN: 2.78
DLCOVA PRE REF: 94.1 %
DLCOVA PRE: 4.02 ML/(MIN*MMHG*L) (ref 2.78–5.76)
DLCOVA REF: 4.27
DLVAADJ PRE: 4.02 ML/(MIN*MMHG*L) (ref 2.78–5.76)
ERV LLN: -16449.39
ERV PRE REF: 96.2 %
ERV REF: 0.61
FEF 25 75 CHG: 10.6 %
FEF 25 75 LLN: 0.79
FEF 25 75 POST REF: 13.1 %
FEF 25 75 PRE REF: 11.9 %
FEF 25 75 REF: 1.74
FET100 CHG: -5.3 %
FEV1 CHG: -1 %
FEV1 FVC CHG: -2.3 %
FEV1 FVC LLN: 64
FEV1 FVC POST REF: 48.7 %
FEV1 FVC PRE REF: 49.8 %
FEV1 FVC REF: 78
FEV1 LLN: 1.49
FEV1 POST REF: 39.5 %
FEV1 PRE REF: 39.8 %
FEV1 REF: 2.07
FRCPLETH LLN: 1.83
FRCPLETH PREREF: 105.9 %
FRCPLETH REF: 2.66
FVC CHG: 1.3 %
FVC LLN: 1.93
FVC POST REF: 80.4 %
FVC PRE REF: 79.3 %
FVC REF: 2.67
IVC PRE: 2.08 L (ref 1.93–3.41)
IVC SINGLE BREATH LLN: 1.93
IVC SINGLE BREATH PRE REF: 78 %
IVC SINGLE BREATH REF: 2.67
MVV LLN: 65
MVV PRE REF: 43 %
MVV REF: 77
PEF CHG: -3.8 %
PEF LLN: 3.68
PEF POST REF: 46.1 %
PEF PRE REF: 47.9 %
PEF REF: 5.34
POST FEF 25 75: 0.23 L/S (ref 0.79–2.7)
POST FET 100: 14.7 SEC
POST FEV1 FVC: 37.96 % (ref 64.32–91.6)
POST FEV1: 0.81 L (ref 1.49–2.64)
POST FVC: 2.15 L (ref 1.93–3.41)
POST PEF: 2.46 L/S (ref 3.68–7)
PRE DLCO: 11.71 ML/(MIN*MMHG) (ref 14.63–26.1)
PRE ERV: 0.58 L (ref -16449.39–16450.61)
PRE FEF 25 75: 0.21 L/S (ref 0.79–2.7)
PRE FET 100: 15.52 SEC
PRE FEV1 FVC: 38.84 % (ref 64.32–91.6)
PRE FEV1: 0.82 L (ref 1.49–2.64)
PRE FRC PL: 2.81 L
PRE FVC: 2.12 L (ref 1.93–3.41)
PRE MVV: 33 L/MIN (ref 65.28–88.32)
PRE PEF: 2.56 L/S (ref 3.68–7)
PRE RV: 2.17 L (ref 1.47–2.62)
PRE TLC: 4.29 L (ref 3.78–5.76)
RAW LLN: 3.06
RAW PRE REF: 372.2 %
RAW PRE: 11.39 CMH2O*S/L (ref 3.06–3.06)
RAW REF: 3.06
RV LLN: 1.47
RV PRE REF: 105.8 %
RV REF: 2.05
RVTLC LLN: 34
RVTLC PRE REF: 116.4 %
RVTLC PRE: 50.55 % (ref 33.85–53.03)
RVTLC REF: 43
TLC LLN: 3.78
TLC PRE REF: 89.8 %
TLC REF: 4.77
VA PRE: 2.92 L (ref 4.62–4.62)
VA SINGLE BREATH LLN: 4.62
VA SINGLE BREATH PRE REF: 63.1 %
VA SINGLE BREATH REF: 4.62
VC LLN: 1.93
VC PRE REF: 79.3 %
VC PRE: 2.12 L (ref 1.93–3.41)
VC REF: 2.67
VTGRAWPRE: 3.53 L

## 2024-03-19 PROCEDURE — 99900031 HC PATIENT EDUCATION (STAT)

## 2024-03-19 PROCEDURE — 94060 EVALUATION OF WHEEZING: CPT

## 2024-03-19 PROCEDURE — 99900035 HC TECH TIME PER 15 MIN (STAT)

## 2024-03-19 PROCEDURE — 94760 N-INVAS EAR/PLS OXIMETRY 1: CPT

## 2024-03-19 PROCEDURE — 94727 GAS DIL/WSHOT DETER LNG VOL: CPT

## 2024-03-19 PROCEDURE — 25000242 PHARM REV CODE 250 ALT 637 W/ HCPCS: Performed by: STUDENT IN AN ORGANIZED HEALTH CARE EDUCATION/TRAINING PROGRAM

## 2024-03-19 PROCEDURE — 94729 DIFFUSING CAPACITY: CPT

## 2024-03-19 RX ORDER — ALBUTEROL SULFATE 0.83 MG/ML
2.5 SOLUTION RESPIRATORY (INHALATION)
Status: COMPLETED | OUTPATIENT
Start: 2024-03-19 | End: 2024-03-19

## 2024-03-19 RX ADMIN — ALBUTEROL SULFATE 2.5 MG: 2.5 SOLUTION RESPIRATORY (INHALATION) at 01:03

## 2024-03-24 LAB — CEFTIBUTEN ISLT MIC: NORMAL

## 2024-03-30 NOTE — PROGRESS NOTES
Follow-up (1 month follow up)       HPI:    Patient presents for one-month follow-up of anxiety and depression.  We started her on Trintellix and held her Cymbalta.  She was quite anxious secondary to recent abnormal CT.    She reports her stomach is not feeling well. She is not able to eat secondary to her stomach hurting. She is seeing him tomorrow. Her stomach has been bothering her for a while; she had an EGD 2-3 months ago.     She did a sputum test which revealed bacteria. She was prescribed Omnicef. She has a phone conference on Monday to discuss results.     She can not tell if Trintellix is helping.   She has a lot of anxiety.       Current Outpatient Medications   Medication Instructions    albuterol (PROVENTIL/VENTOLIN HFA) 90 mcg/actuation inhaler 1-2 puffs, Inhalation, Every 6 hours PRN, Rescue    albuterol-ipratropium (DUO-NEB) 2.5 mg-0.5 mg/3 mL nebulizer solution USE 1 AMPULE IN NEBULIZER ONCE DAILY FOLLOWING AMIKACIN NEBULIZATION    alendronate (FOSAMAX) 70 MG tablet TAKE 1 TABLET BY MOUTH ONCE A WEEK ON  EVERY  TUESDAY    ALPRAZolam (XANAX) 0.5 MG tablet TAKE 1 TABLET BY MOUTH THREE TIMES DAILY AS NEEDED FOR ANXIETY    amLODIPine (NORVASC) 10 mg, Oral    aspirin (ECOTRIN) 81 MG EC tablet tablet    atorvastatin (LIPITOR) 80 mg, Oral    azithromycin (Z-AMANDO) 250 MG tablet Three times weekly    azithromycin (Z-AMANDO) 250 mg, Oral, Every Mon, Wed, Fri    Bifidobacterium infantis (ALIGN) 4 mg Cap capsule    cefdinir (OMNICEF) 300 mg, Oral, 2 times daily    cyclobenzaprine (FLEXERIL) 10 mg, Oral, Nightly    dicyclomine (BENTYL) 20 mg tablet 1 tablet, Oral, Daily    diltiaZEM (CARDIZEM CD) 120 mg, Oral    diphenoxylate-atropine 2.5-0.025 mg (LOMOTIL) 2.5-0.025 mg per tablet 1 tablet, Oral, 3 times daily PRN    DULoxetine (CYMBALTA) 60 MG capsule Take 1 capsule by mouth once daily    ergocalciferol (ERGOCALCIFEROL) 50,000 unit Cap Take 1 capsule by mouth once a week    estradiol 0.025 mg/24 hr 0.025 mg/24 hr  "APPLY 1 PATCH TOPICALLY ONCE A WEEK EVERY  FRIDAY    famotidine (PEPCID) 40 mg, Oral, Nightly    fluticasone-umeclidin-vilanter (TRELEGY ELLIPTA) 100-62.5-25 mcg DsDv 1 puff, Inhalation, Daily    hyoscyamine (LEVSIN/SL) 0.125 mg Subl 60 tablets    iron bis-gly/FA/C/B12/Ca/succ (IRON-150 ORAL) Oral    L.acidophilus-B.bifidum,longum 150 mg (3 billion cell) Chew No dose, route, or frequency recorded.    mupirocin (BACTROBAN) 2 % ointment APPLY OINTMENT TOPICALLY TWICE DAILY TO SORES    omeprazole (PRILOSEC) 40 mg, Oral, Before breakfast    ondansetron (ZOFRAN) 8 mg, Oral, Every 8 hours PRN    ondansetron (ZOFRAN-ODT) 8 MG TbDL DISSOLVE 1 TABLET IN MOUTH EVERY 6 HOURS AS NEEDED FOR NAUSEA    pantoprazole (PROTONIX) 40 MG tablet TAKE 1 TABLET BY MOUTH TWICE DAILY TAKE AT LEAST 30 MINUTES BEFORE MEAL BREAKFAST AND EVENINGS MEALS    REXULTI 2 mg Tab Take 1 tablet by mouth once daily    roflumilast (DALIRESP) 500 mcg, Oral, Daily    sodium chloride 3% 3 % nebulizer solution USE 4 ML IN NEBULIZER ONCE DAILY    sodium chloride 3% 3 % nebulizer solution 4 mLs, Inhalation, 2 times daily PRN    sucralfate (CARAFATE) 1 gram tablet 90 tablets    temazepam (RESTORIL) 30 mg, Oral, Nightly PRN    umeclidinium-vilanteroL (ANORO ELLIPTA) 62.5-25 mcg/actuation DsDv 1 puff, Inhalation, Daily, Controller    valsartan (DIOVAN) 160 mg, Oral, Daily    vortioxetine (TRINTELLIX) 10 mg Tab Take 1 tablet by mouth daily.         ROS:    See HPI.      PE:    ..Visit Vitals  /68   Pulse 70   Temp 98 °F (36.7 °C)   Ht 5' 3" (1.6 m)   Wt 41.5 kg (91 lb 9.6 oz)   SpO2 100%   BMI 16.23 kg/m²        General:  She is well-developed well-nourished elderly white female in no apparent distress.  She is alert and oriented.  She has a depressed affect.  She is accompanied by her daughter Maeve.        1. Depressive disorder  Overview:  Doing well overall; she continues to have stress secondary to health issues and 's dementia.  Continue current " medications.    08/29/2023: Patient has had increased depression since her 's passing 1 month ago.  She is also grieving.  She reports sadness, depression crying spells.    She has problems with staying asleep.    She has no appetite and has lost 15 lb.  We will increase her mirtazapine to 45 mg daily.  Continue Cymbalta and Abilify.  ER precautions given   Follow-up in 1 month.    10/26/2023: Patient continues to be depressed.  She is only eating 1 meal a day.  She has a lack of appetite.  She does not feel Abilify is helpful.    Will discontinue Abilify.    Start Rexulti 1 mg daily.  Follow-up office visit in 1 month.    ER precautions given.    11/27/2023: Patient states her depression is improving.  She is now eating 2 meals a day; however, she has lost 5 more lb.  She is sleeping well.    She has less crying spells and feels sad less often.    Increase Rexulti to 2 mg daily.    Continue mirtazapine 45 mg daily; refills sent.    Continue Cymbalta.  Follow-up office visit in 1 month.    ER precautions given.    12/13/2023: Patient is still grieving loss of her .    She discontinued Rexulti as she did not feel it was helping; she was concerned it was upsetting her stomach.    We will increase his Cymbalta to 90 mg daily.  She denies any suicidal or harmful thoughts; ER precautions given.    Follow-up office visit in one-month.    02/02/2024:  Patient continues to report depression, sadness and crying spells.  She would like to get off of Cymbalta; patient advised that we will need to taper her off this medication.  She is to decrease her dosage to 60 mg daily.  She would like to resume Rexulti; this medication was not affecting her stomach.  Resume 2 mg daily.  She denies any harmful or suicidal thoughts; ER precautions given.  She is to call with an update in 2-3 weeks.    Follow-up office visit in 6 weeks.    03/01/2024:  Patient has not notice any improvement on resulting; will discontinue.  Her  daughter is on Trintellix and doing well.  Patient would like to try Trintellix.    Start Trintellix 10 mg daily; potential side effects discussed with patient.    Hold Cymbalta for now.  Patient denies any suicidal or harmful thoughts; ER precautions given.    Follow-up office visit in one-month.    04/02/2024: Patient continues to be depressed.  She is sad most of the time.  It appears most of her depression is due to persistent anxiety.  She has been having a lot of GI distress recently.  She is also awaiting the final results of her pulmonary testing.  Her daughter is having hard surgery.  Continue Trintellix at current dosage.      2. Anxiety  Overview:  Obviously, her anxiety has been increased with loss of her .    Continue current medications.    10/26/2023: Her anxiety continues to be increased.  Will discontinue Abilify secondary to lack of efficacy.    Start Rexulti 1 mg daily.    12/13/2023:  Patient continues to report anxiety; she is always worrying.    Will increase Cymbalta to a total of 90 mg daily.    Prescription for 30 mg dosage sent to take along with her existing 60 mg dosage.  Patient discontinued Rexulti due to proceed stomach upset.  Follow-up office visit in one-month.    02/02/2024:  See ' Depressive disorder. '    03/01/2024:  Her anxiety has been increased with recent abnormal CT findings.    04/02/2024:  Patient continues to have a lot of anxiety; she has not been feeling well.  She worries all the time.  Patient has been taking 1 Xanax before bedtime.  Patient advised to take 1/2 tablet in a.m., 1/2 tablet at noon and continue 1/2 tablet at bedtime.  Potential risks and side effects discussed with patient/daughter.  I advise patient/daughter that I believe that getting her anxiety under control would help a lot of her issues.  They expressed understanding.                ..Follow up in about 1 month (around 5/2/2024) for Follow Up.       Future Appointments   Date Time Provider  Department Center   5/2/2024 10:30 AM [facility]   5/2/2024  2:20 PM [facility]

## 2024-04-02 ENCOUNTER — OFFICE VISIT (OUTPATIENT)
Dept: PRIMARY CARE CLINIC | Facility: CLINIC | Age: 74
End: 2024-04-02
Payer: MEDICARE

## 2024-04-02 VITALS
HEART RATE: 70 BPM | WEIGHT: 91.63 LBS | HEIGHT: 63 IN | TEMPERATURE: 98 F | OXYGEN SATURATION: 100 % | DIASTOLIC BLOOD PRESSURE: 68 MMHG | BODY MASS INDEX: 16.23 KG/M2 | SYSTOLIC BLOOD PRESSURE: 122 MMHG

## 2024-04-02 DIAGNOSIS — F32.A DEPRESSIVE DISORDER: Primary | ICD-10-CM

## 2024-04-02 DIAGNOSIS — F41.9 ANXIETY: ICD-10-CM

## 2024-04-02 PROCEDURE — 99213 OFFICE O/P EST LOW 20 MIN: CPT | Mod: ,,, | Performed by: FAMILY MEDICINE

## 2024-04-02 RX ORDER — CEFDINIR 300 MG/1
300 CAPSULE ORAL 2 TIMES DAILY
COMMUNITY
Start: 2024-04-01 | End: 2024-05-02

## 2024-04-16 DIAGNOSIS — F41.9 ANXIETY: Primary | ICD-10-CM

## 2024-04-16 RX ORDER — VORTIOXETINE 10 MG/1
TABLET, FILM COATED ORAL
Qty: 30 TABLET | Refills: 1 | Status: SHIPPED | OUTPATIENT
Start: 2024-04-16

## 2024-04-17 DIAGNOSIS — J44.9 CHRONIC OBSTRUCTIVE PULMONARY DISEASE, UNSPECIFIED COPD TYPE: Primary | ICD-10-CM

## 2024-04-25 DIAGNOSIS — F41.9 ANXIETY: Primary | ICD-10-CM

## 2024-04-25 RX ORDER — TEMAZEPAM 30 MG/1
30 CAPSULE ORAL NIGHTLY PRN
Qty: 30 CAPSULE | Refills: 5 | OUTPATIENT
Start: 2024-04-25

## 2024-05-02 ENCOUNTER — HOSPITAL ENCOUNTER (OUTPATIENT)
Dept: RADIOLOGY | Facility: HOSPITAL | Age: 74
Discharge: HOME | End: 2024-05-02
Attending: INTERNAL MEDICINE
Payer: MEDICARE

## 2024-05-02 DIAGNOSIS — J44.9 CHRONIC OBSTRUCTIVE PULMONARY DISEASE, UNSPECIFIED COPD TYPE: ICD-10-CM

## 2024-05-02 DIAGNOSIS — J42 CHRONIC BRONCHITIS, UNSPECIFIED CHRONIC BRONCHITIS TYPE: ICD-10-CM

## 2024-05-02 PROCEDURE — 71046 X-RAY EXAM CHEST 2 VIEWS: CPT | Mod: TC

## 2024-05-17 NOTE — PROGRESS NOTES
Dictation #1  MRN:[id number]  CSN:[id number] Follow-up (1 month follow up)       HPI:    Patient presents for recheck of anxiety and depression.  When we last saw patient on 04/02/2024, she was continuing to have a lot of anxiety.  We changed her alprazolam dosing to 1/2 tablet in a.m., 1/2 tablet at noon and 1 tablet at bedtime.  She has been taking Trintellix and Xanax as above. Her depression has been doing better overall. She has infrequent crying spells. She has been sleeping well.       Current Outpatient Medications   Medication Instructions    albuterol (PROVENTIL/VENTOLIN HFA) 90 mcg/actuation inhaler 1-2 puffs, Inhalation, Every 6 hours PRN, Rescue    albuterol-ipratropium (DUO-NEB) 2.5 mg-0.5 mg/3 mL nebulizer solution USE 1 AMPULE IN NEBULIZER ONCE DAILY FOLLOWING AMIKACIN NEBULIZATION    alendronate (FOSAMAX) 70 MG tablet TAKE 1 TABLET BY MOUTH ONCE A WEEK ON  EVERY  TUESDAY    ALPRAZolam (XANAX) 0.5 MG tablet Take 1/2 tablet in am, 1/2 tablet at lunch and 1 tablet in pm.    amLODIPine (NORVASC) 10 mg, Oral    aspirin (ECOTRIN) 81 MG EC tablet tablet    atorvastatin (LIPITOR) 80 mg, Oral    azithromycin (Z-AMANDO) 250 mg, Oral, Every Mon, Wed, Fri    Bifidobacterium infantis (ALIGN) 4 mg Cap capsule    cyclobenzaprine (FLEXERIL) 10 mg, Oral, Nightly    dicyclomine (BENTYL) 20 mg tablet 1 tablet, Oral, Daily    diltiaZEM (CARDIZEM CD) 120 mg, Oral    diphenoxylate-atropine 2.5-0.025 mg (LOMOTIL) 2.5-0.025 mg per tablet 1 tablet, 3 times daily PRN    DULoxetine (CYMBALTA) 60 MG capsule Take 1 capsule by mouth once daily    ergocalciferol (ERGOCALCIFEROL) 50,000 unit Cap Take 1 capsule by mouth once a week    estradiol 0.025 mg/24 hr 0.025 mg/24 hr APPLY 1 PATCH TOPICALLY ONCE A WEEK EVERY  FRIDAY    famotidine (PEPCID) 40 mg, Oral, Nightly    hyoscyamine (LEVSIN/SL) 0.125 mg Subl 60 tablets    iron bis-gly/FA/C/B12/Ca/succ (IRON-150 ORAL) Oral    L.acidophilus-B.bifidum,longum 150 mg (3 billion cell) Chew No  "dose, route, or frequency recorded.    mirtazapine (REMERON) 45 mg, Oral    mupirocin (BACTROBAN) 2 % ointment APPLY OINTMENT TOPICALLY TWICE DAILY TO SORES    omeprazole (PRILOSEC) 40 mg, Before breakfast    ondansetron (ZOFRAN-ODT) 8 MG TbDL DISSOLVE 1 TABLET IN MOUTH EVERY 6 HOURS AS NEEDED FOR NAUSEA    pantoprazole (PROTONIX) 40 MG tablet TAKE 1 TABLET BY MOUTH TWICE DAILY TAKE AT LEAST 30 MINUTES BEFORE MEAL BREAKFAST AND EVENINGS MEALS    REXULTI 2 mg Tab Take 1 tablet by mouth once daily    roflumilast (DALIRESP) 500 mcg, Oral, Daily    sodium chloride 3% 3 % nebulizer solution USE 4 ML IN NEBULIZER ONCE DAILY    sodium chloride 3% 3 % nebulizer solution 4 mLs, Inhalation, 2 times daily PRN    sucralfate (CARAFATE) 1 gram tablet 90 tablets    temazepam (RESTORIL) 30 mg, Oral, Nightly PRN    umeclidinium-vilanteroL (ANORO ELLIPTA) 62.5-25 mcg/actuation DsDv 1 puff, Inhalation, Daily, Controller    valsartan (DIOVAN) 160 mg, Oral, Daily    vortioxetine (TRINTELLIX) 10 mg Tab Take 1 tablet by mouth daily.         ROS:    She still reports weight loss; she has not had any abdominal pain in 3 weeks.    She states her ears have been popping for a few weeks. She uses saline spray and Flonase. No ear pain. + Nasal congestion.     PE:    ..Visit Vitals  /62   Pulse 66   Temp 98.3 °F (36.8 °C)   Ht 5' 3" (1.6 m)   Wt 37.9 kg (83 lb 8 oz)   SpO2 95%   BMI 14.79 kg/m²        General:  She is a well-developed well-nourished thin elderly white female in no apparent distress.  She is alert and oriented.  She has a depressed affect.        1. Anxiety  Overview:  Obviously, her anxiety has been increased with loss of her .    Continue current medications.    10/26/2023: Her anxiety continues to be increased.  Will discontinue Abilify secondary to lack of efficacy.    Start Rexulti 1 mg daily.    12/13/2023:  Patient continues to report anxiety; she is always worrying.    Will increase Cymbalta to a total of 90 " mg daily.    Prescription for 30 mg dosage sent to take along with her existing 60 mg dosage.  Patient discontinued Rexulti due to proceed stomach upset.  Follow-up office visit in one-month.    02/02/2024:  See ' Depressive disorder. '    03/01/2024:  Her anxiety has been increased with recent abnormal CT findings.    04/02/2024:  Patient continues to have a lot of anxiety; she has not been feeling well.  She worries all the time.  Patient has been taking 1 Xanax before bedtime.  Patient advised to take 1/2 tablet in a.m., 1/2 tablet at noon and continue 1 tablet at bedtime.  Potential risks and side effects discussed with patient/daughter.  I advise patient/daughter that I believe that getting her anxiety under control would help a lot of her issues.  They expressed understanding.    05/20/2024: Improved with adjustment of alprazolam dosage.  Refills for alprazolam sent.    Orders:  -     ALPRAZolam (XANAX) 0.5 MG tablet; Take 1/2 tablet in am, 1/2 tablet at lunch and 1 tablet in pm.  Dispense: 180 tablet; Refill: 1    2. Depressive disorder  Overview:  Doing well overall; she continues to have stress secondary to health issues and 's dementia.  Continue current medications.    08/29/2023: Patient has had increased depression since her 's passing 1 month ago.  She is also grieving.  She reports sadness, depression crying spells.    She has problems with staying asleep.    She has no appetite and has lost 15 lb.  We will increase her mirtazapine to 45 mg daily.  Continue Cymbalta and Abilify.  ER precautions given   Follow-up in 1 month.    10/26/2023: Patient continues to be depressed.  She is only eating 1 meal a day.  She has a lack of appetite.  She does not feel Abilify is helpful.    Will discontinue Abilify.    Start Rexulti 1 mg daily.  Follow-up office visit in 1 month.    ER precautions given.    11/27/2023: Patient states her depression is improving.  She is now eating 2 meals a day;  however, she has lost 5 more lb.  She is sleeping well.    She has less crying spells and feels sad less often.    Increase Rexulti to 2 mg daily.    Continue mirtazapine 45 mg daily; refills sent.    Continue Cymbalta.  Follow-up office visit in 1 month.    ER precautions given.    12/13/2023: Patient is still grieving loss of her .    She discontinued Rexulti as she did not feel it was helping; she was concerned it was upsetting her stomach.    We will increase his Cymbalta to 90 mg daily.  She denies any suicidal or harmful thoughts; ER precautions given.    Follow-up office visit in one-month.    02/02/2024:  Patient continues to report depression, sadness and crying spells.  She would like to get off of Cymbalta; patient advised that we will need to taper her off this medication.  She is to decrease her dosage to 60 mg daily.  She would like to resume Rexulti; this medication was not affecting her stomach.  Resume 2 mg daily.  She denies any harmful or suicidal thoughts; ER precautions given.  She is to call with an update in 2-3 weeks.    Follow-up office visit in 6 weeks.    03/01/2024:  Patient has not notice any improvement on resulting; will discontinue.  Her daughter is on Trintellix and doing well.  Patient would like to try Trintellix.    Start Trintellix 10 mg daily; potential side effects discussed with patient.    Hold Cymbalta for now.  Patient denies any suicidal or harmful thoughts; ER precautions given.    Follow-up office visit in one-month.    04/02/2024: Patient continues to be depressed.  She is sad most of the time.  It appears most of her depression is due to persistent anxiety.  She has been having a lot of GI distress recently.  She is also awaiting the final results of her pulmonary testing.  Her daughter is having hard surgery.  Continue Trintellix at current dosage.    05/20/2024:  Her depression is improving; continue Trintellix.    Patient denies any suicidal or harmful  thoughts; ER precautions given.      3. Non-recurrent acute serous otitis media of both ears  Overview:  Patient reports occasional popping of her ears.  She denies any otalgia or otorrhea.  She has slight fluid present bilaterally.  Patient advised to continue sinus rinses and Flonase.      4. Weight loss  Overview:  Patient has lost 6.8 lb since 05/02/2024.  She has not been eating much secondary to GI distress; she reports lack of appetite.  [facility] feels her weight loss is secondary to GI issues and not her pulmonary disease.  Patient encouraged to supplement her p.o. intake with Boost or Ensure twice daily.                ..Follow up in about 3 months (around 8/20/2024) for Follow Up.       Future Appointments   Date Time Provider Department Center   8/20/2024  2:00 PM [facility]    11/20/2024 10:40 AM [facility]

## 2024-05-20 ENCOUNTER — OFFICE VISIT (OUTPATIENT)
Dept: PRIMARY CARE CLINIC | Facility: CLINIC | Age: 74
End: 2024-05-20
Payer: MEDICARE

## 2024-05-20 VITALS
SYSTOLIC BLOOD PRESSURE: 113 MMHG | WEIGHT: 83.5 LBS | BODY MASS INDEX: 14.79 KG/M2 | OXYGEN SATURATION: 95 % | HEART RATE: 66 BPM | DIASTOLIC BLOOD PRESSURE: 62 MMHG | HEIGHT: 63 IN | TEMPERATURE: 98 F

## 2024-05-20 DIAGNOSIS — F41.9 ANXIETY: Primary | ICD-10-CM

## 2024-05-20 DIAGNOSIS — R63.4 WEIGHT LOSS: ICD-10-CM

## 2024-05-20 DIAGNOSIS — F32.A DEPRESSIVE DISORDER: ICD-10-CM

## 2024-05-20 DIAGNOSIS — H65.03 NON-RECURRENT ACUTE SEROUS OTITIS MEDIA OF BOTH EARS: ICD-10-CM

## 2024-05-20 PROCEDURE — 99214 OFFICE O/P EST MOD 30 MIN: CPT | Mod: ,,, | Performed by: FAMILY MEDICINE

## 2024-05-20 RX ORDER — ALPRAZOLAM 0.5 MG/1
TABLET ORAL
Qty: 180 TABLET | Refills: 1 | Status: SHIPPED | OUTPATIENT
Start: 2024-05-20

## 2024-05-20 RX ORDER — MIRTAZAPINE 45 MG/1
45 TABLET, FILM COATED ORAL
COMMUNITY
Start: 2024-05-04 | End: 2024-06-03

## 2024-05-24 ENCOUNTER — HOSPITAL ENCOUNTER (INPATIENT)
Facility: HOSPITAL | Age: 74
LOS: 5 days | Discharge: HOSPICE | DRG: 280 | End: 2024-05-29
Attending: STUDENT IN AN ORGANIZED HEALTH CARE EDUCATION/TRAINING PROGRAM | Admitting: STUDENT IN AN ORGANIZED HEALTH CARE EDUCATION/TRAINING PROGRAM
Payer: MEDICARE

## 2024-05-24 DIAGNOSIS — R53.81 PHYSICAL DECONDITIONING: Primary | ICD-10-CM

## 2024-05-24 DIAGNOSIS — F32.A DEPRESSIVE DISORDER: ICD-10-CM

## 2024-05-24 DIAGNOSIS — R06.02 SHORTNESS OF BREATH: ICD-10-CM

## 2024-05-24 DIAGNOSIS — F41.9 ANXIETY: ICD-10-CM

## 2024-05-24 DIAGNOSIS — I50.9 CHF (CONGESTIVE HEART FAILURE): ICD-10-CM

## 2024-05-24 LAB
25(OH)D3+25(OH)D2 SERPL-MCNC: 84 NG/ML (ref 30–80)
ALBUMIN SERPL-MCNC: 3.1 G/DL (ref 3.4–4.8)
ALBUMIN/GLOB SERPL: 0.9 RATIO (ref 1.1–2)
ALP SERPL-CCNC: 72 UNIT/L (ref 40–150)
ALT SERPL-CCNC: 11 UNIT/L (ref 0–55)
ANION GAP SERPL CALC-SCNC: 10 MEQ/L
ANION GAP SERPL CALC-SCNC: 9 MEQ/L
AST SERPL-CCNC: 18 UNIT/L (ref 5–34)
B PERT.PT PRMT NPH QL NAA+NON-PROBE: NOT DETECTED
BASOPHILS # BLD AUTO: 0.02 X10(3)/MCL
BASOPHILS NFR BLD AUTO: 0.3 %
BILIRUB SERPL-MCNC: 0.8 MG/DL
BNP BLD-MCNC: 143.8 PG/ML
BUN SERPL-MCNC: 24.2 MG/DL (ref 9.8–20.1)
BUN SERPL-MCNC: 26.8 MG/DL (ref 9.8–20.1)
C PNEUM DNA NPH QL NAA+NON-PROBE: NOT DETECTED
CALCIUM SERPL-MCNC: 14.2 MG/DL (ref 8.4–10.2)
CALCIUM SERPL-MCNC: 14.2 MG/DL (ref 8.4–10.2)
CHLORIDE SERPL-SCNC: 101 MMOL/L (ref 98–107)
CHLORIDE SERPL-SCNC: 102 MMOL/L (ref 98–107)
CK MB SERPL-MCNC: 2.5 NG/ML
CK SERPL-CCNC: 95 U/L (ref 29–168)
CO2 SERPL-SCNC: 34 MMOL/L (ref 23–31)
CO2 SERPL-SCNC: 35 MMOL/L (ref 23–31)
CREAT SERPL-MCNC: 1.15 MG/DL (ref 0.55–1.02)
CREAT SERPL-MCNC: 1.22 MG/DL (ref 0.55–1.02)
CREAT/UREA NIT SERPL: 20
CREAT/UREA NIT SERPL: 23
EOSINOPHIL # BLD AUTO: 0.07 X10(3)/MCL (ref 0–0.9)
EOSINOPHIL NFR BLD AUTO: 1.2 %
ERYTHROCYTE [DISTWIDTH] IN BLOOD BY AUTOMATED COUNT: 13.8 % (ref 11.5–17)
FLUAV AG UPPER RESP QL IA.RAPID: NOT DETECTED
FLUBV AG UPPER RESP QL IA.RAPID: NOT DETECTED
GFR SERPLBLD CREATININE-BSD FMLA CKD-EPI: 47 ML/MIN/1.73/M2
GFR SERPLBLD CREATININE-BSD FMLA CKD-EPI: 51 ML/MIN/1.73/M2
GLOBULIN SER-MCNC: 3.4 GM/DL (ref 2.4–3.5)
GLUCOSE SERPL-MCNC: 104 MG/DL (ref 82–115)
GLUCOSE SERPL-MCNC: 122 MG/DL (ref 82–115)
HADV DNA NPH QL NAA+NON-PROBE: NOT DETECTED
HCOV 229E RNA NPH QL NAA+NON-PROBE: NOT DETECTED
HCOV HKU1 RNA NPH QL NAA+NON-PROBE: NOT DETECTED
HCOV NL63 RNA NPH QL NAA+NON-PROBE: NOT DETECTED
HCOV OC43 RNA NPH QL NAA+NON-PROBE: NOT DETECTED
HCT VFR BLD AUTO: 35.2 % (ref 37–47)
HGB BLD-MCNC: 11.5 G/DL (ref 12–16)
HMPV RNA NPH QL NAA+NON-PROBE: NOT DETECTED
HPIV1 RNA NPH QL NAA+NON-PROBE: NOT DETECTED
HPIV2 RNA NPH QL NAA+NON-PROBE: NOT DETECTED
HPIV3 RNA NPH QL NAA+NON-PROBE: NOT DETECTED
HPIV4 RNA NPH QL NAA+NON-PROBE: NOT DETECTED
IMM GRANULOCYTES # BLD AUTO: 0.03 X10(3)/MCL (ref 0–0.04)
IMM GRANULOCYTES NFR BLD AUTO: 0.5 %
INR PPP: 1.1
LACTATE SERPL-SCNC: 1.3 MMOL/L (ref 0.5–2.2)
LYMPHOCYTES # BLD AUTO: 0.75 X10(3)/MCL (ref 0.6–4.6)
LYMPHOCYTES NFR BLD AUTO: 13.1 %
M PNEUMO DNA NPH QL NAA+NON-PROBE: NOT DETECTED
MAGNESIUM SERPL-MCNC: 2.2 MG/DL (ref 1.6–2.6)
MCH RBC QN AUTO: 26.9 PG (ref 27–31)
MCHC RBC AUTO-ENTMCNC: 32.7 G/DL (ref 33–36)
MCV RBC AUTO: 82.2 FL (ref 80–94)
MONOCYTES # BLD AUTO: 0.63 X10(3)/MCL (ref 0.1–1.3)
MONOCYTES NFR BLD AUTO: 11 %
NEUTROPHILS # BLD AUTO: 4.22 X10(3)/MCL (ref 2.1–9.2)
NEUTROPHILS NFR BLD AUTO: 73.9 %
NRBC BLD AUTO-RTO: 0 %
OHS QRS DURATION: 150 MS
OHS QTC CALCULATION: 392 MS
PHOSPHATE SERPL-MCNC: 3.2 MG/DL (ref 2.3–4.7)
PLATELET # BLD AUTO: 225 X10(3)/MCL (ref 130–400)
PMV BLD AUTO: 9.9 FL (ref 7.4–10.4)
POTASSIUM SERPL-SCNC: 2.3 MMOL/L (ref 3.5–5.1)
POTASSIUM SERPL-SCNC: 2.4 MMOL/L (ref 3.5–5.1)
PROT SERPL-MCNC: 6.5 GM/DL (ref 5.8–7.6)
PROTHROMBIN TIME: 14 SECONDS (ref 12.5–14.5)
PTH-INTACT SERPL-MCNC: 31.3 PG/ML (ref 8.7–77)
RBC # BLD AUTO: 4.28 X10(6)/MCL (ref 4.2–5.4)
RSV A 5' UTR RNA NPH QL NAA+PROBE: NOT DETECTED
RSV RNA NPH QL NAA+NON-PROBE: NOT DETECTED
RV+EV RNA NPH QL NAA+NON-PROBE: NOT DETECTED
SARS-COV-2 RNA RESP QL NAA+PROBE: NOT DETECTED
SODIUM SERPL-SCNC: 145 MMOL/L (ref 136–145)
SODIUM SERPL-SCNC: 146 MMOL/L (ref 136–145)
TROPONIN I SERPL-MCNC: 0.02 NG/ML (ref 0–0.04)
TROPONIN I SERPL-MCNC: 0.5 NG/ML (ref 0–0.04)
WBC # SPEC AUTO: 5.72 X10(3)/MCL (ref 4.5–11.5)

## 2024-05-24 PROCEDURE — 99900035 HC TECH TIME PER 15 MIN (STAT)

## 2024-05-24 PROCEDURE — 82553 CREATINE MB FRACTION: CPT | Performed by: STUDENT IN AN ORGANIZED HEALTH CARE EDUCATION/TRAINING PROGRAM

## 2024-05-24 PROCEDURE — 85025 COMPLETE CBC W/AUTO DIFF WBC: CPT | Performed by: STUDENT IN AN ORGANIZED HEALTH CARE EDUCATION/TRAINING PROGRAM

## 2024-05-24 PROCEDURE — 84100 ASSAY OF PHOSPHORUS: CPT | Performed by: STUDENT IN AN ORGANIZED HEALTH CARE EDUCATION/TRAINING PROGRAM

## 2024-05-24 PROCEDURE — 99900031 HC PATIENT EDUCATION (STAT)

## 2024-05-24 PROCEDURE — 11000001 HC ACUTE MED/SURG PRIVATE ROOM

## 2024-05-24 PROCEDURE — 83970 ASSAY OF PARATHORMONE: CPT | Performed by: STUDENT IN AN ORGANIZED HEALTH CARE EDUCATION/TRAINING PROGRAM

## 2024-05-24 PROCEDURE — 85610 PROTHROMBIN TIME: CPT | Performed by: STUDENT IN AN ORGANIZED HEALTH CARE EDUCATION/TRAINING PROGRAM

## 2024-05-24 PROCEDURE — 96365 THER/PROPH/DIAG IV INF INIT: CPT

## 2024-05-24 PROCEDURE — 25000242 PHARM REV CODE 250 ALT 637 W/ HCPCS: Performed by: STUDENT IN AN ORGANIZED HEALTH CARE EDUCATION/TRAINING PROGRAM

## 2024-05-24 PROCEDURE — 87798 DETECT AGENT NOS DNA AMP: CPT | Performed by: STUDENT IN AN ORGANIZED HEALTH CARE EDUCATION/TRAINING PROGRAM

## 2024-05-24 PROCEDURE — 84484 ASSAY OF TROPONIN QUANT: CPT | Performed by: STUDENT IN AN ORGANIZED HEALTH CARE EDUCATION/TRAINING PROGRAM

## 2024-05-24 PROCEDURE — 87040 BLOOD CULTURE FOR BACTERIA: CPT | Performed by: STUDENT IN AN ORGANIZED HEALTH CARE EDUCATION/TRAINING PROGRAM

## 2024-05-24 PROCEDURE — 63600175 PHARM REV CODE 636 W HCPCS: Performed by: PHYSICIAN ASSISTANT

## 2024-05-24 PROCEDURE — 36415 COLL VENOUS BLD VENIPUNCTURE: CPT | Performed by: STUDENT IN AN ORGANIZED HEALTH CARE EDUCATION/TRAINING PROGRAM

## 2024-05-24 PROCEDURE — 21400001 HC TELEMETRY ROOM

## 2024-05-24 PROCEDURE — 80053 COMPREHEN METABOLIC PANEL: CPT | Performed by: STUDENT IN AN ORGANIZED HEALTH CARE EDUCATION/TRAINING PROGRAM

## 2024-05-24 PROCEDURE — 82306 VITAMIN D 25 HYDROXY: CPT | Performed by: STUDENT IN AN ORGANIZED HEALTH CARE EDUCATION/TRAINING PROGRAM

## 2024-05-24 PROCEDURE — 83605 ASSAY OF LACTIC ACID: CPT | Performed by: STUDENT IN AN ORGANIZED HEALTH CARE EDUCATION/TRAINING PROGRAM

## 2024-05-24 PROCEDURE — 93005 ELECTROCARDIOGRAM TRACING: CPT

## 2024-05-24 PROCEDURE — 94640 AIRWAY INHALATION TREATMENT: CPT

## 2024-05-24 PROCEDURE — 0241U COVID/RSV/FLU A&B PCR: CPT | Performed by: STUDENT IN AN ORGANIZED HEALTH CARE EDUCATION/TRAINING PROGRAM

## 2024-05-24 PROCEDURE — 99291 CRITICAL CARE FIRST HOUR: CPT

## 2024-05-24 PROCEDURE — 25000003 PHARM REV CODE 250: Performed by: STUDENT IN AN ORGANIZED HEALTH CARE EDUCATION/TRAINING PROGRAM

## 2024-05-24 PROCEDURE — 25500020 PHARM REV CODE 255: Performed by: STUDENT IN AN ORGANIZED HEALTH CARE EDUCATION/TRAINING PROGRAM

## 2024-05-24 PROCEDURE — 82550 ASSAY OF CK (CPK): CPT | Performed by: STUDENT IN AN ORGANIZED HEALTH CARE EDUCATION/TRAINING PROGRAM

## 2024-05-24 PROCEDURE — 94644 CONT INHLJ TX 1ST HOUR: CPT

## 2024-05-24 PROCEDURE — 63600175 PHARM REV CODE 636 W HCPCS: Performed by: STUDENT IN AN ORGANIZED HEALTH CARE EDUCATION/TRAINING PROGRAM

## 2024-05-24 PROCEDURE — 93010 ELECTROCARDIOGRAM REPORT: CPT | Mod: ,,, | Performed by: INTERNAL MEDICINE

## 2024-05-24 PROCEDURE — 96366 THER/PROPH/DIAG IV INF ADDON: CPT

## 2024-05-24 PROCEDURE — 25000003 PHARM REV CODE 250: Performed by: NURSE PRACTITIONER

## 2024-05-24 PROCEDURE — 96375 TX/PRO/DX INJ NEW DRUG ADDON: CPT

## 2024-05-24 PROCEDURE — 94760 N-INVAS EAR/PLS OXIMETRY 1: CPT

## 2024-05-24 PROCEDURE — 83735 ASSAY OF MAGNESIUM: CPT | Performed by: STUDENT IN AN ORGANIZED HEALTH CARE EDUCATION/TRAINING PROGRAM

## 2024-05-24 PROCEDURE — 27000221 HC OXYGEN, UP TO 24 HOURS

## 2024-05-24 PROCEDURE — 83880 ASSAY OF NATRIURETIC PEPTIDE: CPT | Performed by: STUDENT IN AN ORGANIZED HEALTH CARE EDUCATION/TRAINING PROGRAM

## 2024-05-24 RX ORDER — ENOXAPARIN SODIUM 100 MG/ML
30 INJECTION SUBCUTANEOUS EVERY 24 HOURS
Status: DISCONTINUED | OUTPATIENT
Start: 2024-05-24 | End: 2024-05-24

## 2024-05-24 RX ORDER — MIRTAZAPINE 15 MG/1
45 TABLET, FILM COATED ORAL NIGHTLY
Status: DISCONTINUED | OUTPATIENT
Start: 2024-05-24 | End: 2024-05-29 | Stop reason: HOSPADM

## 2024-05-24 RX ORDER — POTASSIUM CHLORIDE 14.9 MG/ML
40 INJECTION INTRAVENOUS ONCE
Status: COMPLETED | OUTPATIENT
Start: 2024-05-24 | End: 2024-05-24

## 2024-05-24 RX ORDER — DILTIAZEM HYDROCHLORIDE 120 MG/1
120 CAPSULE, COATED, EXTENDED RELEASE ORAL DAILY
Status: DISCONTINUED | OUTPATIENT
Start: 2024-05-25 | End: 2024-05-29 | Stop reason: HOSPADM

## 2024-05-24 RX ORDER — ENOXAPARIN SODIUM 100 MG/ML
1 INJECTION SUBCUTANEOUS
Status: DISCONTINUED | OUTPATIENT
Start: 2024-05-25 | End: 2024-05-27

## 2024-05-24 RX ORDER — ONDANSETRON HYDROCHLORIDE 2 MG/ML
4 INJECTION, SOLUTION INTRAVENOUS EVERY 6 HOURS PRN
Status: DISCONTINUED | OUTPATIENT
Start: 2024-05-24 | End: 2024-05-29 | Stop reason: HOSPADM

## 2024-05-24 RX ORDER — ACETAMINOPHEN 325 MG/1
650 TABLET ORAL EVERY 4 HOURS PRN
Status: DISCONTINUED | OUTPATIENT
Start: 2024-05-24 | End: 2024-05-29 | Stop reason: HOSPADM

## 2024-05-24 RX ORDER — SODIUM CHLORIDE 9 MG/ML
INJECTION, SOLUTION INTRAVENOUS CONTINUOUS
Status: DISCONTINUED | OUTPATIENT
Start: 2024-05-24 | End: 2024-05-24

## 2024-05-24 RX ORDER — SODIUM CHLORIDE 9 MG/ML
INJECTION, SOLUTION INTRAVENOUS CONTINUOUS
Status: DISCONTINUED | OUTPATIENT
Start: 2024-05-25 | End: 2024-05-25

## 2024-05-24 RX ORDER — FAMOTIDINE 20 MG/1
20 TABLET, FILM COATED ORAL NIGHTLY
Status: DISCONTINUED | OUTPATIENT
Start: 2024-05-24 | End: 2024-05-26

## 2024-05-24 RX ORDER — TALC
6 POWDER (GRAM) TOPICAL NIGHTLY PRN
Status: DISCONTINUED | OUTPATIENT
Start: 2024-05-24 | End: 2024-05-29 | Stop reason: HOSPADM

## 2024-05-24 RX ORDER — IPRATROPIUM BROMIDE AND ALBUTEROL SULFATE 2.5; .5 MG/3ML; MG/3ML
3 SOLUTION RESPIRATORY (INHALATION) EVERY 4 HOURS
Status: DISCONTINUED | OUTPATIENT
Start: 2024-05-24 | End: 2024-05-25

## 2024-05-24 RX ORDER — ATORVASTATIN CALCIUM 40 MG/1
80 TABLET, FILM COATED ORAL DAILY
Status: DISCONTINUED | OUTPATIENT
Start: 2024-05-25 | End: 2024-05-29 | Stop reason: HOSPADM

## 2024-05-24 RX ORDER — HYDRALAZINE HYDROCHLORIDE 20 MG/ML
10 INJECTION INTRAMUSCULAR; INTRAVENOUS EVERY 4 HOURS PRN
Status: DISCONTINUED | OUTPATIENT
Start: 2024-05-24 | End: 2024-05-29 | Stop reason: HOSPADM

## 2024-05-24 RX ORDER — AMLODIPINE BESYLATE 5 MG/1
10 TABLET ORAL DAILY
Status: DISCONTINUED | OUTPATIENT
Start: 2024-05-24 | End: 2024-05-26

## 2024-05-24 RX ORDER — POTASSIUM CHLORIDE 20 MEQ/1
40 TABLET, EXTENDED RELEASE ORAL 2 TIMES DAILY
Status: DISCONTINUED | OUTPATIENT
Start: 2024-05-24 | End: 2024-05-25

## 2024-05-24 RX ORDER — POTASSIUM CHLORIDE 7.45 MG/ML
10 INJECTION INTRAVENOUS
Status: DISPENSED | OUTPATIENT
Start: 2024-05-24 | End: 2024-05-24

## 2024-05-24 RX ORDER — AZITHROMYCIN 250 MG/1
250 TABLET, FILM COATED ORAL
Status: DISCONTINUED | OUTPATIENT
Start: 2024-05-24 | End: 2024-05-29 | Stop reason: HOSPADM

## 2024-05-24 RX ORDER — ALBUTEROL SULFATE 0.83 MG/ML
10 SOLUTION RESPIRATORY (INHALATION)
Status: COMPLETED | OUTPATIENT
Start: 2024-05-24 | End: 2024-05-24

## 2024-05-24 RX ORDER — CALCITONIN SALMON 200 [USP'U]/ML
4 INJECTION, SOLUTION INTRAMUSCULAR; SUBCUTANEOUS EVERY 12 HOURS
Status: DISCONTINUED | OUTPATIENT
Start: 2024-05-24 | End: 2024-05-25

## 2024-05-24 RX ORDER — POTASSIUM CHLORIDE 20 MEQ/1
40 TABLET, EXTENDED RELEASE ORAL 2 TIMES DAILY
Status: DISCONTINUED | OUTPATIENT
Start: 2024-05-24 | End: 2024-05-24

## 2024-05-24 RX ORDER — LABETALOL HYDROCHLORIDE 5 MG/ML
10 INJECTION, SOLUTION INTRAVENOUS
Status: DISCONTINUED | OUTPATIENT
Start: 2024-05-24 | End: 2024-05-24

## 2024-05-24 RX ORDER — POTASSIUM CHLORIDE 14.9 MG/ML
20 INJECTION INTRAVENOUS ONCE
Status: DISCONTINUED | OUTPATIENT
Start: 2024-05-24 | End: 2024-05-24

## 2024-05-24 RX ORDER — ASPIRIN 81 MG/1
81 TABLET ORAL DAILY
Status: DISCONTINUED | OUTPATIENT
Start: 2024-05-25 | End: 2024-05-29 | Stop reason: HOSPADM

## 2024-05-24 RX ORDER — DICYCLOMINE HYDROCHLORIDE 20 MG/1
20 TABLET ORAL DAILY
Status: DISCONTINUED | OUTPATIENT
Start: 2024-05-25 | End: 2024-05-29 | Stop reason: HOSPADM

## 2024-05-24 RX ORDER — SODIUM CHLORIDE, SODIUM LACTATE, POTASSIUM CHLORIDE, CALCIUM CHLORIDE 600; 310; 30; 20 MG/100ML; MG/100ML; MG/100ML; MG/100ML
INJECTION, SOLUTION INTRAVENOUS CONTINUOUS
Status: DISCONTINUED | OUTPATIENT
Start: 2024-05-24 | End: 2024-05-24

## 2024-05-24 RX ORDER — IPRATROPIUM BROMIDE 0.5 MG/2.5ML
0.5 SOLUTION RESPIRATORY (INHALATION)
Status: COMPLETED | OUTPATIENT
Start: 2024-05-24 | End: 2024-05-24

## 2024-05-24 RX ORDER — LABETALOL HYDROCHLORIDE 5 MG/ML
10 INJECTION, SOLUTION INTRAVENOUS
Status: COMPLETED | OUTPATIENT
Start: 2024-05-24 | End: 2024-05-24

## 2024-05-24 RX ORDER — HYDRALAZINE HYDROCHLORIDE 20 MG/ML
10 INJECTION INTRAMUSCULAR; INTRAVENOUS
Status: COMPLETED | OUTPATIENT
Start: 2024-05-24 | End: 2024-05-24

## 2024-05-24 RX ADMIN — IPRATROPIUM BROMIDE 0.5 MG: 0.5 SOLUTION RESPIRATORY (INHALATION) at 11:05

## 2024-05-24 RX ADMIN — IOHEXOL 60 ML: 350 INJECTION, SOLUTION INTRAVENOUS at 12:05

## 2024-05-24 RX ADMIN — IPRATROPIUM BROMIDE AND ALBUTEROL SULFATE 3 ML: 2.5; .5 SOLUTION RESPIRATORY (INHALATION) at 07:05

## 2024-05-24 RX ADMIN — MIRTAZAPINE 45 MG: 15 TABLET, FILM COATED ORAL at 08:05

## 2024-05-24 RX ADMIN — FAMOTIDINE 20 MG: 20 TABLET, FILM COATED ORAL at 08:05

## 2024-05-24 RX ADMIN — POTASSIUM CHLORIDE 20 MEQ: 14.9 INJECTION, SOLUTION INTRAVENOUS at 10:05

## 2024-05-24 RX ADMIN — ENOXAPARIN SODIUM 30 MG: 30 INJECTION SUBCUTANEOUS at 06:05

## 2024-05-24 RX ADMIN — DOXYCYCLINE 100 MG: 100 INJECTION, POWDER, LYOPHILIZED, FOR SOLUTION INTRAVENOUS at 06:05

## 2024-05-24 RX ADMIN — POTASSIUM CHLORIDE 40 MEQ: 1500 TABLET, EXTENDED RELEASE ORAL at 08:05

## 2024-05-24 RX ADMIN — ALBUTEROL SULFATE 10 MG: 2.5 SOLUTION RESPIRATORY (INHALATION) at 11:05

## 2024-05-24 RX ADMIN — SODIUM CHLORIDE, POTASSIUM CHLORIDE, SODIUM LACTATE AND CALCIUM CHLORIDE 500 ML: 600; 310; 30; 20 INJECTION, SOLUTION INTRAVENOUS at 09:05

## 2024-05-24 RX ADMIN — LABETALOL HYDROCHLORIDE 10 MG: 5 INJECTION, SOLUTION INTRAVENOUS at 03:05

## 2024-05-24 RX ADMIN — SODIUM CHLORIDE, POTASSIUM CHLORIDE, SODIUM LACTATE AND CALCIUM CHLORIDE 500 ML: 600; 310; 30; 20 INJECTION, SOLUTION INTRAVENOUS at 11:05

## 2024-05-24 RX ADMIN — Medication 6 MG: at 08:05

## 2024-05-24 RX ADMIN — POTASSIUM CHLORIDE 10 MEQ: 7.46 INJECTION, SOLUTION INTRAVENOUS at 12:05

## 2024-05-24 RX ADMIN — SODIUM CHLORIDE: 9 INJECTION, SOLUTION INTRAVENOUS at 06:05

## 2024-05-24 RX ADMIN — SODIUM CHLORIDE, POTASSIUM CHLORIDE, SODIUM LACTATE AND CALCIUM CHLORIDE: 600; 310; 30; 20 INJECTION, SOLUTION INTRAVENOUS at 09:05

## 2024-05-24 RX ADMIN — SODIUM CHLORIDE, POTASSIUM CHLORIDE, SODIUM LACTATE AND CALCIUM CHLORIDE 500 ML: 600; 310; 30; 20 INJECTION, SOLUTION INTRAVENOUS at 02:05

## 2024-05-24 RX ADMIN — HYDRALAZINE HYDROCHLORIDE 10 MG: 20 INJECTION INTRAMUSCULAR; INTRAVENOUS at 01:05

## 2024-05-24 NOTE — PROGRESS NOTES
Pharmacist Renal Dose Adjustment Note    Alesia Walden is a 73 y.o. female being treated with the medication famotidine    Patient Data:    Vital Signs (Most Recent):  Temp: 98.1 °F (36.7 °C) (05/24/24 1614)  Pulse: 64 (05/24/24 1614)  Resp: (!) 22 (05/24/24 1614)  BP: (!) 171/71 (05/24/24 1614)  SpO2: 97 % (05/24/24 1614) Vital Signs (72h Range):  Temp:  [98.1 °F (36.7 °C)-99.9 °F (37.7 °C)]   Pulse:  [55-90]   Resp:  [12-27]   BP: (162-183)/(61-71)   SpO2:  [93 %-100 %]      Recent Labs   Lab 05/24/24  1109   CREATININE 1.15*     Serum creatinine: 1.15 mg/dL (H) 05/24/24 1109  Estimated creatinine clearance: 26.9 mL/min (A)    Medication:famotidine dose: 40mg frequency q24h will be changed to medication:famotidine dose:20mg frequency:q24h per renal dose adjustment protocol for CrCl 10-50    Pharmacist's Name: Angie Montano  Pharmacist's Extension: [phone]

## 2024-05-24 NOTE — PROGRESS NOTES
Pharmacist Renal Dose Adjustment Note    Alesia Walden is a 73 y.o. female being treated with the medication enoxaparin    Patient Data:    Vital Signs (Most Recent):  Temp: 99.9 °F (37.7 °C) (05/24/24 1040)  Pulse: 88 (05/24/24 1329)  Resp: (!) 24 (05/24/24 1329)  BP: (!) 175/66 (05/24/24 1301)  SpO2: 99 % (05/24/24 1329) Vital Signs (72h Range):  Temp:  [99.9 °F (37.7 °C)]   Pulse:  [64-90]   Resp:  [19-27]   BP: (162-178)/(61-67)   SpO2:  [93 %-99 %]      Recent Labs   Lab 05/24/24  1109   CREATININE 1.15*     Serum creatinine: 1.15 mg/dL (H) 05/24/24 1109  Estimated creatinine clearance: 26.9 mL/min (A)    Medication: enoxaparin dose: 40 mg frequency daily will be changed to medication: enoxaparin dose: 30 mg frequency: daily    Pharmacist's Name: Joan Rainey  Pharmacist's Extension: [phone]

## 2024-05-24 NOTE — NURSING
Nurses Note -- 4 Eyes      5/24/2024   6:51 PM      Skin assessed during: Admit      [x] No Altered Skin Integrity Present    []Prevention Measures Documented      [] Yes- Altered Skin Integrity Present or Discovered   [] LDA Added if Not in Epic (Describe Wound)   [] New Altered Skin Integrity was Present on Admit and Documented in LDA   [] Wound Image Taken    Wound Care Consulted? No    Attending Nurse:  Mirta Olmedo RN/Staff Member:   Yuliana

## 2024-05-24 NOTE — ED PROVIDER NOTES
Encounter Date: 5/24/2024       History     Chief Complaint   Patient presents with    Shortness of Breath     Pt. C/o SOB, generalized weakness, nausea, vomiting, and decreased appetite.       Alesia Walden is a 73 y.o. female with a complex past medical history who presents to the ED for steadily worsening shortness of breath, nausea, decreased appetite, and elderly to tolerate p.o. intake.  She also reports some generalized weakness.  She denies any fevers but does report intermittent chills.  She denies any chest pain.  She denies any swelling.  Her pulmonologist is Dr. Ray.         Review of patient's allergies indicates:   Allergen Reactions    No known drug allergies      Past Medical History:   Diagnosis Date    Abdominal pain, LLQ     Abdominal pain, RLQ     Acute bilateral low back pain with left-sided sciatica     Anxiety     Cardiac microvascular disease     Carotid bruit     Constipation, unspecified     COPD (chronic obstructive pulmonary disease)     Depressive disorder     Diverticulitis     Dyslipidemia     GERD (gastroesophageal reflux disease), chronic     Heart valve disorder     Iron deficiency anemia     Moderate aortic regurgitation     Primary hypertension     PTCA with stent insertion     Shortness of breath     Status post laser ablation of incompetent vein     Thyroid enlarged     Varicose veins of bilateral lower extremities with other complications     Venous insufficiency      Past Surgical History:   Procedure Laterality Date    COLONOSCOPY N/A 2/6/2023    Procedure: COLON;  Surgeon: Dorothea Davey III, MD;  Location: [facility] ENDOSCOPY;  Service: Gastroenterology;  Laterality: N/A;    COLONOSCOPY, WITH POLYPECTOMY USING SNARE  2/6/2023    Procedure: COLONOSCOPY, WITH POLYPECTOMY USING SNARE;  Surgeon: Dorothea Davey III, MD;  Location: [facility] ENDOSCOPY;  Service: Gastroenterology;;    EGD, WITH CLOSED BIOPSY N/A 3/7/2023    Procedure: EGD;  Surgeon: Dorothea Davey III, MD;   Location: [facility] ENDOSCOPY;  Service: Gastroenterology;  Laterality: N/A;  Pre OP packet for procedure was mailed to patient     Family History   Problem Relation Name Age of Onset    Heart attack Mother      COPD Father          sepsis    No Known Problems Brother       Social History     Tobacco Use    Smoking status: Never     Passive exposure: Never    Smokeless tobacco: Never   Substance Use Topics    Alcohol use: Never    Drug use: Never     Review of Systems   Constitutional:  Positive for chills. Negative for fever.   HENT:  Negative for congestion, drooling and sore throat.    Eyes:  Negative for pain and visual disturbance.   Respiratory:  Positive for cough, shortness of breath and wheezing. Negative for chest tightness.    Cardiovascular:  Negative for chest pain, palpitations and leg swelling.   Gastrointestinal:  Negative for abdominal pain, nausea and vomiting.   Genitourinary:  Negative for dysuria and hematuria.   Musculoskeletal:  Negative for back pain, neck pain and neck stiffness.   Skin:  Negative for pallor and rash.   Neurological:  Negative for weakness and numbness.   Hematological:  Does not bruise/bleed easily.       Physical Exam     Initial Vitals [05/24/24 1040]   BP Pulse Resp Temp SpO2   (!) 162/61 64 (!) 26 99.9 °F (37.7 °C) 98 %      MAP       --         Physical Exam    Nursing note and vitals reviewed.  Constitutional: She appears well-developed.   HENT:   Head: Normocephalic.   Eyes: EOM are normal.   Cardiovascular:            Tachycardic   Pulmonary/Chest: She has wheezes. She has rales.   Abdominal: Abdomen is soft. She exhibits no distension.     Neurological: She is alert.         ED Course   Critical Care    Date/Time: 5/24/2024 2:14 PM    Performed by: Timothy Boswell MD  Authorized by: Timothy Boswell MD  Direct patient critical care time: 35 minutes  Total critical care time (exclusive of procedural time) : 35 minutes  Critical care time was exclusive of  separately billable procedures and treating other patients.  Critical care was necessary to treat or prevent imminent or life-threatening deterioration of the following conditions: respiratory failure.  Critical care was time spent personally by me on the following activities: development of treatment plan with patient or surrogate, discussions with consultants, evaluation of patient's response to treatment, examination of patient, obtaining history from patient or surrogate, ordering and performing treatments and interventions, ordering and review of laboratory studies, ordering and review of radiographic studies, pulse oximetry and re-evaluation of patient's condition.        Labs Reviewed   COMPREHENSIVE METABOLIC PANEL - Abnormal; Notable for the following components:       Result Value    Potassium 2.3 (*)     CO2 34 (*)     Blood Urea Nitrogen 26.8 (*)     Creatinine 1.15 (*)     Calcium 14.2 (*)     Albumin 3.1 (*)     Albumin/Globulin Ratio 0.9 (*)     All other components within normal limits   B-TYPE NATRIURETIC PEPTIDE - Abnormal; Notable for the following components:    Natriuretic Peptide 143.8 (*)     All other components within normal limits   CBC WITH DIFFERENTIAL - Abnormal; Notable for the following components:    Hgb 11.5 (*)     Hct 35.2 (*)     MCH 26.9 (*)     MCHC 32.7 (*)     All other components within normal limits   LACTIC ACID, PLASMA - Normal   TROPONIN I - Normal   MAGNESIUM - Normal   PROTIME-INR - Normal   COVID/RSV/FLU A&B PCR - Normal    Narrative:     The Xpert Xpress SARS-CoV-2/FLU/RSV plus is a rapid, multiplexed real-time PCR test intended for the simultaneous qualitative detection and differentiation of SARS-CoV-2, Influenza A, Influenza B, and respiratory syncytial virus (RSV) viral RNA in either nasopharyngeal swab or nasal swab specimens.         RESPIRATORY PANEL - Normal    Narrative:     The FOBOFire Respiratory Panel 2.1 (RP2.1) is a PCR-based multiplexed nucleic acid  test intended for use with the Loladex® 2.0 for simultaneous qualitative detection and identification of multiple respiratory viral and bacterial nucleic acids in nasopharyngeal swabs (NPS) obtained from individuals suspected of respiratory tract infections.   CBC W/ AUTO DIFFERENTIAL    Narrative:     The following orders were created for panel order CBC auto differential.  Procedure                               Abnormality         Status                     ---------                               -----------         ------                     CBC with Differential[[id number]]       Abnormal            Final result                 Please view results for these tests on the individual orders.        ECG Results              EKG 12-lead (Final result)        Collection Time Result Time QRS Duration OHS QTC Calculation    05/24/24 13:13:53 05/25/24 01:49:53 186 743                     Final result by Interface, Lab In [facility] (05/25/24 01:49:59)                   Narrative:    Test Reason : R06.02,    Vent. Rate : 090 BPM     Atrial Rate : 090 BPM     P-R Int : 268 ms          QRS Dur : 186 ms      QT Int : 608 ms       P-R-T Axes : 078 259 059 degrees     QTc Int : 743 ms    Sinus rhythm with 1st degree A-V block  Right atrial enlargement  Right bundle branch block  Anterolateral infarct (cited on or before 24-MAY-2024)  Abnormal ECG  Confirmed by Gordo Issa MD ([id number]) on 5/25/2024 1:49:49 AM    Referred By: [email or web address]   SELF           Confirmed By:Gordo Issa MD                                     EKG 12-lead (Final result)        Collection Time Result Time QRS Duration OHS QTC Calculation    05/24/24 10:50:25 05/24/24 13:08:12 150 392                     Final result by Interface, Lab In [facility] (05/24/24 13:08:17)                   Narrative:    Test Reason : R06.02,    Vent. Rate : 066 BPM     Atrial Rate : 066 BPM     P-R Int : 192 ms          QRS Dur : 150 ms      QT Int : 374 ms       P-R-T Axes : 078 267 038  degrees     QTc Int : 392 ms    Normal sinus rhythm  Right bundle branch block  Minimal voltage criteria for LVH, may be normal variant ( R in aVL )  Anterolateral infarct (cited on or before 24-MAY-2024)  Abnormal ECG  When compared with ECG of 02-JAN-2024 10:59,  Minimal criteria for Inferior infarct are no longer Present  Questionable change in initial forces of Anterior-lateral leads  Nonspecific T wave abnormality now evident in Inferior leads  QT has shortened  Confirmed by Herberth Amaya MD ([id number]) on 5/24/2024 1:08:07 PM    Referred By: LUIS F   SELF           Confirmed By:Herberth Amaya MD                                  Imaging Results              US Retroperitoneal Complete (Final result)  Result time 05/24/24 15:59:38      Final result by Tomasa Taylor MD (05/24/24 15:59:38)                   Impression:      Mild bilateral hydronephrosis.      Electronically signed by: Tomasa Taylor  Date:    05/24/2024  Time:    15:59               Narrative:    EXAMINATION:  US RETROPERITONEAL COMPLETE    CLINICAL HISTORY:  Bilateral hydronephrosis on CT not well visualized;    TECHNIQUE:  Ultrasound evaluation of the kidneys, region of the ureters, and urinary bladder    COMPARISON:  Ultrasound dated 04/15/2024, CT chest dated 05/24/2024    FINDINGS:  The right kidney measures 10.4 cm with a 1.6 cm angiomyolipoma.  The left kidney measures 8.3 cm. There is mild bilateral hydronephrosis.    The urinary bladder is unremarkable.  Both urinary jets are visualized.    No significant findings within the visualized segments of the abdominal aorta and IVC.                                       CTA Chest Non-Coronary (PE Studies) (Final result)  Result time 05/24/24 13:00:44      Final result by Ariana Rai MD (05/24/24 13:00:44)                   Impression:      No pulmonary embolism seen but there is severe narrowing of the right distal main pulmonary artery as well as proximal right upper and lower  lobe pulmonary arteries.  There is also near complete occlusion of the left upper lobe superior segment pulmonary artery secondary to the left suprahilar mass    Bilateral lung masses as outlined above with associated areas of scattered nodularity as outlined above overall stable since the prior examination    Bilateral hydronephrosis not well evaluated on this examination      Electronically signed by: Jonathan Rai  Date:    05/24/2024  Time:    13:00               Narrative:    EXAMINATION:  CTA CHEST NON CORONARY (PE STUDIES)    CLINICAL HISTORY:  Pulmonary embolism (PE) suspected, high prob;    TECHNIQUE:  Low dose axial images, sagittal and coronal reformations were obtained from the thoracic inlet to the lung bases following the IV administration of contrast..  Contrast timing was optimized to evaluate the pulmonary arteries.  MIP images were performed.  Automated exposure control was utilized    COMPARISON:  03/07/2024    FINDINGS:  There is a large mass seen in the right suprahilar region with associated areas of calcification.  There is also some atelectasis in the right upper lobe.  This was seen on the prior examination as well.  There is a left suprahilar spiculated mass seen as well.  It appears the slightly more prominent than the prior examination.  There is some associated calcifications.  Scattered areas nodularity is seen in the upper lobes which were seen on the prior examination as well.  There is an area of nodular scarring and nodularity in the left lower lobe medially and inferiorly.  Similar changes were seen previously.  There are changes consistent with COPD and emphysema in the lungs bilaterally.  There is a left infrahilar mass seen which is also partially calcified but unchanged.    No pulmonary embolism is seen but there is significant narrowing of the right main distal pulmonary artery as well as the right upper lobe and right lower lobe central branches.  There is essentially the  complete occlusion of the left upper lobe superior segment branches from the mass in the left suprahilar region.    The heart is normal in size.  Thoracic aorta is normal in caliber.  Some atherosclerotic plaque is seen.  No dissection or aneurysm is seen.    Upper abdomen shows evidence of bilateral hydronephrosis which are not well evaluated on this examination.  There is a fat containing lipomatous lesion in the upper pole the right kidney.                                       X-Ray Chest AP Portable (Final result)  Result time 05/24/24 11:57:20      Final result by Prabhakar Blevins MD (05/24/24 11:57:20)                   Impression:      No significant interval change.      Electronically signed by: Prabhakar Blevins  Date:    05/24/2024  Time:    11:57               Narrative:    EXAMINATION:  XR CHEST AP PORTABLE    CLINICAL HISTORY:  Shortness of breath    TECHNIQUE:  One view    COMPARISON:  May 2, 2024.    FINDINGS:  Bilateral hilar and perihilar irregular defined masslike appearance is similar.  No superimposed acute congestion, consolidation or pleural effusions.  No pneumothorax.  Cardiopericardial silhouette is within normal limits.                                       Medications   potassium chloride 10 mEq in 100 mL IVPB ( Intravenous Canceled Entry 5/24/24 1400)   hydrALAZINE injection 10 mg (10 mg Intravenous Given 5/25/24 0046)   ondansetron injection 4 mg (4 mg Intravenous Given 5/26/24 1313)   acetaminophen tablet 650 mg (has no administration in time range)   aspirin EC tablet 81 mg (81 mg Oral Given 5/26/24 0939)   atorvastatin tablet 80 mg (80 mg Oral Given 5/26/24 0939)   azithromycin tablet 250 mg (250 mg Oral Not Given 5/24/24 1800)   dicyclomine tablet 20 mg (20 mg Oral Given 5/26/24 0939)   diltiaZEM 24 hr capsule 120 mg (120 mg Oral Given 5/26/24 0939)   famotidine tablet 20 mg (20 mg Oral Given 5/25/24 2037)   mirtazapine tablet 45 mg (45 mg Oral Given 5/25/24 2037)   doxycycline 100 mg  in dextrose 5 % in water (D5W) 100 mL IVPB (MB+) (0 mg Intravenous Stopped 5/26/24 0616)   cefTRIAXone (Rocephin) 1 g in dextrose 5 % in water (D5W) 100 mL IVPB (MB+) (0 g Intravenous Stopped 5/26/24 0113)   enoxaparin injection 40 mg (40 mg Subcutaneous Given 5/26/24 0516)   melatonin tablet 6 mg (6 mg Oral Given 5/24/24 2058)   ALPRAZolam tablet 0.25 mg (0.25 mg Oral Given 5/26/24 1436)   0.45% NaCl with KCl 20 mEq infusion ( Intravenous New Bag 5/26/24 1206)   polyethylene glycol packet 17 g (17 g Oral Given 5/26/24 0943)   senna-docusate 8.6-50 mg per tablet 1 tablet (1 tablet Oral Given 5/26/24 0939)   vortioxetine Tab 10 mg (10 mg Oral Given 5/26/24 0945)   promethazine injection 12.5 mg (12.5 mg Intramuscular Given 5/26/24 0048)   albuterol-ipratropium 2.5 mg-0.5 mg/3 mL nebulizer solution 3 mL (3 mLs Nebulization Given 5/26/24 1220)   simethicone chewable tablet 80 mg (80 mg Oral Given 5/25/24 1753)   scopolamine 1.3-1.5 mg (1 mg over 3 days) 1 patch (1 patch Transdermal Patch Applied 5/26/24 1310)   megestroL tablet 40 mg (40 mg Oral Given 5/26/24 1212)   hydrocortisone 2.5 % rectal cream ( Rectal Given 5/26/24 1212)   spironolactone tablet 25 mg (25 mg Oral Given 5/26/24 0953)   isosorbide mononitrate 24 hr tablet 30 mg (has no administration in time range)   roflumilast (DALIRESP) tablet 500 mcg (has no administration in time range)   umeclidinium-vilanteroL 62.5-25 mcg/actuation DsDv 1 puff (has no administration in time range)   albuterol nebulizer solution 10 mg (10 mg Nebulization Given 5/24/24 1106)   ipratropium 0.02 % nebulizer solution 0.5 mg (0.5 mg Nebulization Given 5/24/24 1106)   lactated ringers bolus 500 mL (0 mLs Intravenous Stopped 5/24/24 1247)   iohexoL (OMNIPAQUE 350) injection 60 mL (60 mLs Intravenous Given 5/24/24 1252)   hydrALAZINE injection 10 mg (10 mg Intravenous Given 5/24/24 1323)   lactated ringers bolus 500 mL (500 mLs Intravenous New Bag 5/24/24 1453)   labetaloL injection  10 mg (10 mg Intravenous Given 5/24/24 1515)   potassium chloride 20 mEq in 100 mL IVPB (FOR CENTRAL LINE ADMINISTRATION ONLY) (20 mEq Intravenous New Bag 5/24/24 2223)   lactated ringers bolus 500 mL (0 mLs Intravenous Stopped 5/24/24 2210)   potassium chloride 20 mEq in 100 mL IVPB (FOR CENTRAL LINE ADMINISTRATION ONLY) (20 mEq Intravenous New Bag 5/25/24 1239)   calcitonin injection 154 Units (154 Units Subcutaneous Given 5/25/24 1502)   potassium chloride 20 mEq in 100 mL IVPB (FOR CENTRAL LINE ADMINISTRATION ONLY) (20 mEq Intravenous New Bag 5/26/24 1428)     Medical Decision Making  Problems Addressed:  Shortness of breath: acute illness or injury    Amount and/or Complexity of Data Reviewed  Labs: ordered.  Radiology: ordered. Decision-making details documented in ED Course.    Risk  Prescription drug management.  Decision regarding hospitalization.      Differential diagnosis (includes but is not limited to):   Pneumonia, pneumothorax, atypical infection, sepsis, viral URI, COVID, flu, ACS, electrolyte abnormality, dehydration, kidney injury    MDM Narrative  73-year-old female presents for steadily worsening shortness of breath, dyspnea on exertion, decreased appetite, and poor oral intake at home associated with some nausea as well.  She denies any fevers but does report chills.  She reports increasing shortness of breath especially with exertion.  She denies any chest pain.  She denies any abdominal pain.  She denies any urinary symptoms.  Chest x-ray reviewed.  Labs are pending.  Infectious workup including swabs, blood cultures, lactic acid pending.  Low threshold to initiate antibiotics although patient does not currently meet SIRS criteria.  IV fluid rehydration ordered.  CTA of the chest pending to evaluate for PE or infectious process.    Update:  Labs reviewed, imaging reviewed.  Patient has required an increase in her oxygen requirement to 3 L from her normal 2 L although she does feel improved  with this.  Pulmonology consulted, appreciate recommendations.  Patient is significantly hypokalemic to 2.3, replacement ordered, will require admission for further replacement.  Hypercalcemia also noted, continue IV fluid rehydration.  Case discussed with hospitalist, will admit.    Dispo: Admit    My independent radiology interpretation: as above  Point of care US (independently performed and interpreted):   Decision rules/clinical scoring:     Sepsis Perfusion Assessment:     Amount and/or Complexity of Data Reviewed  Independent historian: none   Summary of history:   External data reviewed: notes from previous admissions, notes from previous ED visits, and notes from clinic visits  Summary of data reviewed: Prior records reviewed  Risk and benefits of testing: discussed   Labs: ordered and reviewed  Radiology: ordered and independent interpretation performed (see above or ED course)  ECG/medicine tests: ordered and independent interpretation performed (see above or ED course)  Discussion of management or test interpretation with external provider(s): discussed with hospitalist physician and discussed with pulm consultant   Summary of discussion: as above    Risk  Parenteral controlled substances   Drug therapy requiring intense monitoring for toxicity   Decision regarding hospitalization  Shared decision making     Critical Care  30-74 minutes     Data Reviewed/Counseling: I have personally reviewed the patient's vital signs, nursing notes, and other relevant tests, information, and imaging. I had a detailed discussion regarding the historical points, exam findings, and any diagnostic results supporting the discharge diagnosis. I personally performed the history, PE, MDM and procedures as documented above and agree with the scribe's documentation.    Portions of this note were dictated using voice recognition software. Although it was reviewed for accuracy, some inherent voice recognition errors may have  occurred and may be present in this document.             ED Course as of 05/26/24 1546   Fri May 24, 2024   1055 EKG independently interpreted by me.  EKG: NSR @ 66, LVH, RBBB, Qtc 392 []   1218 X-Ray Chest AP Portable  Independently visualized/reviewed by me during the ED visit.  - Stable bilateral perihilar opacities, no PTX [MC]   1320 EKG independently interpreted by me.  EKG: SR @ 90, no STEMI, 1st degree AVB, Qt 608. []   1344 Hospitalist paged. [DW]      ED Course User Index  [DW] Joe Camara  [MC] Timothy Boswell MD                           Clinical Impression:  Final diagnoses:  [R06.02] Shortness of breath (Primary)          ED Disposition Condition    Admit Stable                Timothy Boswell MD  05/26/24 154

## 2024-05-25 LAB
ALBUMIN SERPL-MCNC: 2.8 G/DL (ref 3.4–4.8)
ALBUMIN/GLOB SERPL: 0.9 RATIO (ref 1.1–2)
ALP SERPL-CCNC: 67 UNIT/L (ref 40–150)
ALT SERPL-CCNC: 13 UNIT/L (ref 0–55)
ANION GAP SERPL CALC-SCNC: 8 MEQ/L
AST SERPL-CCNC: 22 UNIT/L (ref 5–34)
AV INDEX (PROSTH): 0.8
AV MEAN GRADIENT: 7 MMHG
AV PEAK GRADIENT: 12 MMHG
AV REGURGITATION PRESSURE HALF TIME: 487 MS
AV VALVE AREA BY VELOCITY RATIO: 2.55 CM²
AV VALVE AREA: 2.5 CM²
AV VELOCITY RATIO: 0.81
BACTERIA #/AREA URNS AUTO: ABNORMAL /HPF
BASOPHILS # BLD AUTO: 0.04 X10(3)/MCL
BASOPHILS NFR BLD AUTO: 0.6 %
BILIRUB SERPL-MCNC: 0.5 MG/DL
BILIRUB UR QL STRIP.AUTO: NEGATIVE
BSA FOR ECHO PROCEDURE: 1.31 M2
BUN SERPL-MCNC: 20.3 MG/DL (ref 9.8–20.1)
CALCIUM SERPL-MCNC: 12.7 MG/DL (ref 8.4–10.2)
CHLORIDE SERPL-SCNC: 104 MMOL/L (ref 98–107)
CLARITY UR: CLEAR
CO2 SERPL-SCNC: 33 MMOL/L (ref 23–31)
COLOR UR AUTO: COLORLESS
CREAT SERPL-MCNC: 1.05 MG/DL (ref 0.55–1.02)
CREAT/UREA NIT SERPL: 19
CV ECHO LV RWT: 0.46 CM
DOP CALC AO PEAK VEL: 1.7 M/S
DOP CALC AO VTI: 38.7 CM
DOP CALC LVOT AREA: 3.1 CM2
DOP CALC LVOT DIAMETER: 2 CM
DOP CALC LVOT PEAK VEL: 1.38 M/S
DOP CALC LVOT STROKE VOLUME: 96.71 CM3
DOP CALC MV VTI: 38.6 CM
DOP CALCLVOT PEAK VEL VTI: 30.8 CM
E WAVE DECELERATION TIME: 198 MSEC
E/A RATIO: 0.45
E/E' RATIO: 8.77 M/S
ECHO LV POSTERIOR WALL: 1.01 CM (ref 0.6–1.1)
EOSINOPHIL # BLD AUTO: 0.07 X10(3)/MCL (ref 0–0.9)
EOSINOPHIL NFR BLD AUTO: 1.1 %
ERYTHROCYTE [DISTWIDTH] IN BLOOD BY AUTOMATED COUNT: 14.3 % (ref 11.5–17)
FRACTIONAL SHORTENING: 37 % (ref 28–44)
GFR SERPLBLD CREATININE-BSD FMLA CKD-EPI: 57 ML/MIN/1.73/M2
GLOBULIN SER-MCNC: 3.1 GM/DL (ref 2.4–3.5)
GLUCOSE SERPL-MCNC: 126 MG/DL (ref 82–115)
GLUCOSE UR QL STRIP: NORMAL
HCT VFR BLD AUTO: 28.5 % (ref 37–47)
HGB BLD-MCNC: 9.4 G/DL (ref 12–16)
HGB UR QL STRIP: NEGATIVE
IMM GRANULOCYTES # BLD AUTO: 0.02 X10(3)/MCL (ref 0–0.04)
IMM GRANULOCYTES NFR BLD AUTO: 0.3 %
INTERVENTRICULAR SEPTUM: 0.93 CM (ref 0.6–1.1)
KETONES UR QL STRIP: NEGATIVE
LEFT ATRIUM SIZE: 3.5 CM
LEFT ATRIUM VOLUME INDEX MOD: 12 ML/M2
LEFT ATRIUM VOLUME MOD: 16.1 CM3
LEFT INTERNAL DIMENSION IN SYSTOLE: 2.76 CM (ref 2.1–4)
LEFT VENTRICLE DIASTOLIC VOLUME INDEX: 65.82 ML/M2
LEFT VENTRICLE DIASTOLIC VOLUME: 88.2 ML
LEFT VENTRICLE MASS INDEX: 106 G/M2
LEFT VENTRICLE SYSTOLIC VOLUME INDEX: 21.3 ML/M2
LEFT VENTRICLE SYSTOLIC VOLUME: 28.5 ML
LEFT VENTRICULAR INTERNAL DIMENSION IN DIASTOLE: 4.41 CM (ref 3.5–6)
LEFT VENTRICULAR MASS: 142.27 G
LEUKOCYTE ESTERASE UR QL STRIP: NEGATIVE
LV LATERAL E/E' RATIO: 6.33 M/S
LV SEPTAL E/E' RATIO: 14.25 M/S
LVOT MG: 4 MMHG
LVOT MV: 0.93 CM/S
LYMPHOCYTES # BLD AUTO: 0.67 X10(3)/MCL (ref 0.6–4.6)
LYMPHOCYTES NFR BLD AUTO: 10.6 %
MCH RBC QN AUTO: 26.9 PG (ref 27–31)
MCHC RBC AUTO-ENTMCNC: 33 G/DL (ref 33–36)
MCV RBC AUTO: 81.7 FL (ref 80–94)
MONOCYTES # BLD AUTO: 0.7 X10(3)/MCL (ref 0.1–1.3)
MONOCYTES NFR BLD AUTO: 11.1 %
MV MEAN GRADIENT: 3 MMHG
MV PEAK A VEL: 1.28 M/S
MV PEAK E VEL: 0.57 M/S
MV PEAK GRADIENT: 6 MMHG
MV STENOSIS PRESSURE HALF TIME: 56 MS
MV VALVE AREA BY CONTINUITY EQUATION: 2.51 CM2
MV VALVE AREA P 1/2 METHOD: 3.93 CM2
NEUTROPHILS # BLD AUTO: 4.83 X10(3)/MCL (ref 2.1–9.2)
NEUTROPHILS NFR BLD AUTO: 76.3 %
NITRITE UR QL STRIP: NEGATIVE
NRBC BLD AUTO-RTO: 0 %
OHS LV EJECTION FRACTION SIMPSONS BIPLANE MOD: 63 %
OHS QRS DURATION: 186 MS
OHS QTC CALCULATION: 743 MS
PH UR STRIP: 6.5 [PH]
PISA AR MAX VEL: 4.33 M/S
PISA TR MAX VEL: 2.94 M/S
PLATELET # BLD AUTO: 258 X10(3)/MCL (ref 130–400)
PMV BLD AUTO: 10 FL (ref 7.4–10.4)
POTASSIUM SERPL-SCNC: 2.4 MMOL/L (ref 3.5–5.1)
PROT SERPL-MCNC: 5.9 GM/DL (ref 5.8–7.6)
PROT UR QL STRIP: NEGATIVE
PV PEAK GRADIENT: 3 MMHG
PV PEAK VELOCITY: 0.91 M/S
RA PRESSURE ESTIMATED: 3 MMHG
RBC # BLD AUTO: 3.49 X10(6)/MCL (ref 4.2–5.4)
RBC #/AREA URNS AUTO: ABNORMAL /HPF
RV TB RVSP: 6 MMHG
SODIUM SERPL-SCNC: 145 MMOL/L (ref 136–145)
SP GR UR STRIP.AUTO: 1.01 (ref 1–1.03)
SQUAMOUS #/AREA URNS LPF: ABNORMAL /HPF
TDI LATERAL: 0.09 M/S
TDI SEPTAL: 0.04 M/S
TDI: 0.07 M/S
TR MAX PG: 35 MMHG
TRICUSPID ANNULAR PLANE SYSTOLIC EXCURSION: 2.21 CM
TROPONIN I SERPL-MCNC: 0.23 NG/ML (ref 0–0.04)
TROPONIN I SERPL-MCNC: 0.29 NG/ML (ref 0–0.04)
TROPONIN I SERPL-MCNC: 0.44 NG/ML (ref 0–0.04)
TV REST PULMONARY ARTERY PRESSURE: 38 MMHG
UROBILINOGEN UR STRIP-ACNC: NORMAL
WBC # SPEC AUTO: 6.33 X10(3)/MCL (ref 4.5–11.5)
WBC #/AREA URNS AUTO: ABNORMAL /HPF
Z-SCORE OF LEFT VENTRICULAR DIMENSION IN END DIASTOLE: 0.25
Z-SCORE OF LEFT VENTRICULAR DIMENSION IN END SYSTOLE: 0.25

## 2024-05-25 PROCEDURE — 99900031 HC PATIENT EDUCATION (STAT)

## 2024-05-25 PROCEDURE — 99900035 HC TECH TIME PER 15 MIN (STAT)

## 2024-05-25 PROCEDURE — 81001 URINALYSIS AUTO W/SCOPE: CPT | Performed by: STUDENT IN AN ORGANIZED HEALTH CARE EDUCATION/TRAINING PROGRAM

## 2024-05-25 PROCEDURE — 25000242 PHARM REV CODE 250 ALT 637 W/ HCPCS: Performed by: STUDENT IN AN ORGANIZED HEALTH CARE EDUCATION/TRAINING PROGRAM

## 2024-05-25 PROCEDURE — 36415 COLL VENOUS BLD VENIPUNCTURE: CPT | Performed by: STUDENT IN AN ORGANIZED HEALTH CARE EDUCATION/TRAINING PROGRAM

## 2024-05-25 PROCEDURE — 84484 ASSAY OF TROPONIN QUANT: CPT | Performed by: STUDENT IN AN ORGANIZED HEALTH CARE EDUCATION/TRAINING PROGRAM

## 2024-05-25 PROCEDURE — 36415 COLL VENOUS BLD VENIPUNCTURE: CPT | Performed by: PHYSICIAN ASSISTANT

## 2024-05-25 PROCEDURE — 21400001 HC TELEMETRY ROOM

## 2024-05-25 PROCEDURE — 63600175 PHARM REV CODE 636 W HCPCS: Performed by: PHYSICIAN ASSISTANT

## 2024-05-25 PROCEDURE — 27000221 HC OXYGEN, UP TO 24 HOURS

## 2024-05-25 PROCEDURE — 63600175 PHARM REV CODE 636 W HCPCS: Performed by: STUDENT IN AN ORGANIZED HEALTH CARE EDUCATION/TRAINING PROGRAM

## 2024-05-25 PROCEDURE — 25000003 PHARM REV CODE 250: Performed by: STUDENT IN AN ORGANIZED HEALTH CARE EDUCATION/TRAINING PROGRAM

## 2024-05-25 PROCEDURE — 94760 N-INVAS EAR/PLS OXIMETRY 1: CPT

## 2024-05-25 PROCEDURE — 80053 COMPREHEN METABOLIC PANEL: CPT | Performed by: PHYSICIAN ASSISTANT

## 2024-05-25 PROCEDURE — 85025 COMPLETE CBC W/AUTO DIFF WBC: CPT | Performed by: PHYSICIAN ASSISTANT

## 2024-05-25 PROCEDURE — 94640 AIRWAY INHALATION TREATMENT: CPT

## 2024-05-25 RX ORDER — ALPRAZOLAM 0.25 MG/1
0.25 TABLET ORAL 3 TIMES DAILY
Status: DISCONTINUED | OUTPATIENT
Start: 2024-05-25 | End: 2024-05-29 | Stop reason: HOSPADM

## 2024-05-25 RX ORDER — FLUTICASONE FUROATE AND VILANTEROL 100; 25 UG/1; UG/1
1 POWDER RESPIRATORY (INHALATION) DAILY
Status: DISCONTINUED | OUTPATIENT
Start: 2024-05-26 | End: 2024-05-26

## 2024-05-25 RX ORDER — PROMETHAZINE HYDROCHLORIDE 25 MG/ML
12.5 INJECTION, SOLUTION INTRAMUSCULAR; INTRAVENOUS EVERY 4 HOURS PRN
Status: DISCONTINUED | OUTPATIENT
Start: 2024-05-25 | End: 2024-05-29 | Stop reason: HOSPADM

## 2024-05-25 RX ORDER — SIMETHICONE 80 MG
1 TABLET,CHEWABLE ORAL 3 TIMES DAILY PRN
Status: DISCONTINUED | OUTPATIENT
Start: 2024-05-25 | End: 2024-05-29 | Stop reason: HOSPADM

## 2024-05-25 RX ORDER — AMOXICILLIN 250 MG
1 CAPSULE ORAL DAILY PRN
Status: DISCONTINUED | OUTPATIENT
Start: 2024-05-25 | End: 2024-05-25

## 2024-05-25 RX ORDER — CALCITONIN SALMON 200 [USP'U]/ML
4 INJECTION, SOLUTION INTRAMUSCULAR; SUBCUTANEOUS EVERY 12 HOURS
Status: DISCONTINUED | OUTPATIENT
Start: 2024-05-25 | End: 2024-05-25

## 2024-05-25 RX ORDER — AMOXICILLIN 250 MG
1 CAPSULE ORAL DAILY
Status: DISCONTINUED | OUTPATIENT
Start: 2024-05-25 | End: 2024-05-29 | Stop reason: HOSPADM

## 2024-05-25 RX ORDER — CALCITONIN SALMON 200 [USP'U]/ML
4 INJECTION, SOLUTION INTRAMUSCULAR; SUBCUTANEOUS EVERY 12 HOURS
Status: COMPLETED | OUTPATIENT
Start: 2024-05-25 | End: 2024-05-25

## 2024-05-25 RX ORDER — POTASSIUM CHLORIDE 14.9 MG/ML
20 INJECTION INTRAVENOUS
Status: COMPLETED | OUTPATIENT
Start: 2024-05-25 | End: 2024-05-25

## 2024-05-25 RX ORDER — SODIUM CHLORIDE AND POTASSIUM CHLORIDE 150; 450 MG/100ML; MG/100ML
INJECTION, SOLUTION INTRAVENOUS CONTINUOUS
Status: DISCONTINUED | OUTPATIENT
Start: 2024-05-25 | End: 2024-05-29 | Stop reason: HOSPADM

## 2024-05-25 RX ORDER — POLYETHYLENE GLYCOL 3350 17 G/17G
17 POWDER, FOR SOLUTION ORAL DAILY
Status: DISCONTINUED | OUTPATIENT
Start: 2024-05-25 | End: 2024-05-29 | Stop reason: HOSPADM

## 2024-05-25 RX ORDER — IPRATROPIUM BROMIDE AND ALBUTEROL SULFATE 2.5; .5 MG/3ML; MG/3ML
3 SOLUTION RESPIRATORY (INHALATION)
Status: DISCONTINUED | OUTPATIENT
Start: 2024-05-25 | End: 2024-05-29 | Stop reason: HOSPADM

## 2024-05-25 RX ADMIN — ONDANSETRON 4 MG: 2 INJECTION INTRAMUSCULAR; INTRAVENOUS at 08:05

## 2024-05-25 RX ADMIN — POLYETHYLENE GLYCOL 3350 17 G: 17 POWDER, FOR SOLUTION ORAL at 12:05

## 2024-05-25 RX ADMIN — CALCITONIN SALMON 154 UNITS: 200 INJECTION, SOLUTION INTRAMUSCULAR; SUBCUTANEOUS at 03:05

## 2024-05-25 RX ADMIN — VORTIOXETINE 10 MG: 10 TABLET, FILM COATED ORAL at 02:05

## 2024-05-25 RX ADMIN — ASPIRIN 81 MG: 81 TABLET, COATED ORAL at 10:05

## 2024-05-25 RX ADMIN — POTASSIUM CHLORIDE AND SODIUM CHLORIDE: 450; 150 INJECTION, SOLUTION INTRAVENOUS at 07:05

## 2024-05-25 RX ADMIN — DOXYCYCLINE 100 MG: 100 INJECTION, POWDER, LYOPHILIZED, FOR SOLUTION INTRAVENOUS at 05:05

## 2024-05-25 RX ADMIN — ALPRAZOLAM 0.25 MG: 0.25 TABLET ORAL at 10:05

## 2024-05-25 RX ADMIN — AMLODIPINE BESYLATE 10 MG: 5 TABLET ORAL at 10:05

## 2024-05-25 RX ADMIN — IPRATROPIUM BROMIDE AND ALBUTEROL SULFATE 3 ML: 2.5; .5 SOLUTION RESPIRATORY (INHALATION) at 09:05

## 2024-05-25 RX ADMIN — FAMOTIDINE 20 MG: 20 TABLET, FILM COATED ORAL at 08:05

## 2024-05-25 RX ADMIN — IPRATROPIUM BROMIDE AND ALBUTEROL SULFATE 3 ML: 2.5; .5 SOLUTION RESPIRATORY (INHALATION) at 03:05

## 2024-05-25 RX ADMIN — HYDRALAZINE HYDROCHLORIDE 10 MG: 20 INJECTION INTRAMUSCULAR; INTRAVENOUS at 12:05

## 2024-05-25 RX ADMIN — ALPRAZOLAM 0.25 MG: 0.25 TABLET ORAL at 08:05

## 2024-05-25 RX ADMIN — SENNOSIDES AND DOCUSATE SODIUM 1 TABLET: 8.6; 5 TABLET ORAL at 12:05

## 2024-05-25 RX ADMIN — MIRTAZAPINE 45 MG: 15 TABLET, FILM COATED ORAL at 08:05

## 2024-05-25 RX ADMIN — ATORVASTATIN CALCIUM 80 MG: 40 TABLET, FILM COATED ORAL at 10:05

## 2024-05-25 RX ADMIN — DILTIAZEM HYDROCHLORIDE 120 MG: 120 CAPSULE, COATED, EXTENDED RELEASE ORAL at 10:05

## 2024-05-25 RX ADMIN — CEFTRIAXONE SODIUM 1 G: 1 INJECTION, POWDER, FOR SOLUTION INTRAMUSCULAR; INTRAVENOUS at 01:05

## 2024-05-25 RX ADMIN — ONDANSETRON 4 MG: 2 INJECTION INTRAMUSCULAR; INTRAVENOUS at 03:05

## 2024-05-25 RX ADMIN — POTASSIUM CHLORIDE AND SODIUM CHLORIDE: 450; 150 INJECTION, SOLUTION INTRAVENOUS at 10:05

## 2024-05-25 RX ADMIN — IPRATROPIUM BROMIDE AND ALBUTEROL SULFATE 3 ML: 2.5; .5 SOLUTION RESPIRATORY (INHALATION) at 04:05

## 2024-05-25 RX ADMIN — SODIUM CHLORIDE: 9 INJECTION, SOLUTION INTRAVENOUS at 12:05

## 2024-05-25 RX ADMIN — IPRATROPIUM BROMIDE AND ALBUTEROL SULFATE 3 ML: 2.5; .5 SOLUTION RESPIRATORY (INHALATION) at 12:05

## 2024-05-25 RX ADMIN — PROMETHAZINE HYDROCHLORIDE 12.5 MG: 25 INJECTION INTRAMUSCULAR; INTRAVENOUS at 04:05

## 2024-05-25 RX ADMIN — ALPRAZOLAM 0.25 MG: 0.25 TABLET ORAL at 02:05

## 2024-05-25 RX ADMIN — IPRATROPIUM BROMIDE AND ALBUTEROL SULFATE 3 ML: 2.5; .5 SOLUTION RESPIRATORY (INHALATION) at 01:05

## 2024-05-25 RX ADMIN — ENOXAPARIN SODIUM 40 MG: 40 INJECTION SUBCUTANEOUS at 05:05

## 2024-05-25 RX ADMIN — DICYCLOMINE HYDROCHLORIDE 20 MG: 20 TABLET ORAL at 10:05

## 2024-05-25 RX ADMIN — CALCITONIN SALMON 154 UNITS: 200 INJECTION, SOLUTION INTRAMUSCULAR; SUBCUTANEOUS at 12:05

## 2024-05-25 RX ADMIN — POTASSIUM CHLORIDE 20 MEQ: 14.9 INJECTION, SOLUTION INTRAVENOUS at 12:05

## 2024-05-25 RX ADMIN — SIMETHICONE 80 MG: 80 TABLET, CHEWABLE ORAL at 05:05

## 2024-05-25 RX ADMIN — POTASSIUM CHLORIDE 20 MEQ: 14.9 INJECTION, SOLUTION INTRAVENOUS at 10:05

## 2024-05-25 RX ADMIN — ONDANSETRON 4 MG: 2 INJECTION INTRAMUSCULAR; INTRAVENOUS at 01:05

## 2024-05-25 NOTE — PROGRESS NOTES
[facility] Hospital Medicine Progress Note        Chief Complaint: Inpatient Follow-up for     HPI:   Mrs Walden is a 73 year old lady with PMH of HTN, HLD, CAD/Stents, PAD/B/L Stents, GERD, COPD on home O2, Fe Def Anemia, Prior MAC Infection on Chronic Suppressive Therapy who was admitted on 05/24 with complaints of nausea, vomiting for the last 2-3 days particularly on attempting PO intake. She has also been experiencing worsening SOB, fatigue and lethargy. At bedside she endorsed central sharp chest pain which was described to be non-radiating and only present since earlier in the morning on day of admission. Reported poor PO intake for the last few days. Denied any fever, hematemesis, dysphagia, night sweats, headache, numbness, weakness, dizziness, lightheadedness or LOC but did admit to having chronic epigastric abdominal pain, chills, cough productive of white sputum. On admission noted to be afebile, normotensive and saturating well on 3 L O2. Labs showed HGB/HCT of 11.5/35.2, WBCs of 5.72, platelets of 225, potassium of 2.3, calcium of 14.2, HC03 of 34, BUN/creatinine of 26.8/1.15, AST/ALT of 18/11, ALP of 72.  BNP of 143.8, troponin of 0.019 which trended up to 0.502, lactic acid of 1.3.  Respiratory PCR panel done which was unremarkable.  Blood cultures sent off.  CXR showed hyperinflated lungs with right upper zone irregular opacity with no obvious infiltrates/consolidates or effusions.  CTA chest showed no PE with stenosis of distal right main pulmonary artery, right upper and lower pulmonary arteries, right upper lobe soft tissue opacity with calcifications which was seen on prior exams as well, associated right upper lobe atelectasis, left suprahilar soft tissue opacity with calcifications also seen on prior exam, left infrahilar soft tissue opacity with some calcifications seen on prior exam, subpleural area of nodular opacity as well as some scarring in left lower lobe  also seen on previous exam, some emphysematous changes noted B/L.  U/S retroperitoneum done which showed mild bilateral hydronephrosis. Admitted to hospital medicine. Pulmonology consulted.      Pulmonology note reviewed and treatment history of MAC infection noted.  Previous AFB smears have been negative with no growth on 02/09/2024, 03/27/2024 with AFB smear/mycobacterial culture from 04/17 still pending.  Patient remains on chronic suppressive treatment with azithromycin TIW.     General: alert lady lying comfortably in bed, in no acute distress  HENT: NC in place; oral and oropharyngeal mucosa moist, pink, with no erythema or exudates, no ear pain or discharge  Neck: normal neck movement, no lymph nodes or swellings, no JVD or Carotid bruit  Respiratory: clear breathing sounds bilaterally, no crackles, rales, ronchi or wheezes  Cardiovascular: clear S1 and S2, no murmurs, rubs or gallops  Peripheral Vascular: no lesions, ulcers or erosions, normal peripheral pulses and no pedal edema  Gastrointestinal: soft, non-tender, non-distended abdomen, no guarding, rigidity or rebound tenderness, normal bowel sounds  Integumentary: normal skin color, no rashes or lesions  Neuro: AAO x 3; motor strength 5/5 in B/L UEs & LEs; sensation intact to gross and fine touch B/L; CN II-XII grossly intact     Will start on DuoNeb q4hrs & IV Rocephin/Doxycycline. Will continue PO Azithromycin TIW. Pulmonology on-board; will follow recommendations. In light of ongoing chest pain & troponinemia, will start FD Lovenox & consult CIS. Will keep trending Troponins. Will start IV NS for WISAM & worsened hypercalcemia likely d/t intravascular depletion. Will give SC Calcitonin BID x 2 doses given corrected Calcium is > 14 with symptoms of lethargy & dyspnea. Will hold off on IV Zoledronic Acid for now and dose if Calcium levels do not show improvement. Metabolic alkalemia noted likely d/t N/V. Will replace K. Family requested Palliative Care  consult for discussion regarding goals of care; will place consult. Continuing home Amlodipine 10 mg, ASA 81 mg, Atorvastatin 80 mg, Bentyl 20 mg, Diltiazem 120 mg, Pepcid 20 mg, Mirtazapine 45 mg. Continue monitoring symptoms.      Interval Hx:   Patient seen and examined by bedside.  Family by bedside.  Patient continues to feel nauseous this a.m..  Continues to have abdominal pain.  Denies any diarrhea, fever, does report some chills.  Says that has been having this abdominal pain since the death of her  2 years ago.  Currently is on her chronic O2 requirements.    Case was discussed with patient's nurse and  on the floor.    Objective/physical exam:  General: In no acute distress, afebrile, cachectic  Chest: Clear to auscultation bilaterally, on her chronic home O2 requirement  Heart: RRR, +S1, S2, no appreciable murmur  Abdomen: Soft, nontender, BS +  MSK: Warm, no lower extremity edema, no clubbing or cyanosis  Neurologic: Alert and oriented x4, Cranial nerve II-XII intact, Strength 5/5 in all 4 extremities    VITAL SIGNS: 24 HRS MIN & MAX LAST   Temp  Min: 98.1 °F (36.7 °C)  Max: 98.8 °F (37.1 °C) 98.1 °F (36.7 °C)   BP  Min: 131/68  Max: 160/65 137/61   Pulse  Min: 52  Max: 70  70   Resp  Min: 17  Max: 25 19   SpO2  Min: 95 %  Max: 100 % 98 %     I have reviewed the following labs:  Recent Labs   Lab 05/24/24  1109 05/25/24  0246   WBC 5.72 6.33   RBC 4.28 3.49*   HGB 11.5* 9.4*   HCT 35.2* 28.5*   MCV 82.2 81.7   MCH 26.9* 26.9*   MCHC 32.7* 33.0   RDW 13.8 14.3    258   MPV 9.9 10.0     Recent Labs   Lab 05/24/24  1109 05/24/24  1906 05/25/24  0246    146* 145   K 2.3* 2.4* 2.4*   CO2 34* 35* 33*   BUN 26.8* 24.2* 20.3*   CREATININE 1.15* 1.22* 1.05*   CALCIUM 14.2* 14.2* 12.7*   MG 2.20  --   --    ALBUMIN 3.1*  --  2.8*   ALKPHOS 72  --  67   ALT 11  --  13   AST 18  --  22   BILITOT 0.8  --  0.5     Microbiology Results (last 7 days)       Procedure Component Value Units  Date/Time    Blood Culture #1 **CANNOT BE ORDERED STAT** [[id number]]  (Normal) Collected: 05/24/24 1113    Order Status: Completed Specimen: Blood Updated: 05/25/24 1300     Blood Culture No Growth At 24 Hours    Blood Culture #1 **CANNOT BE ORDERED STAT** [[id number]]  (Normal) Collected: 05/24/24 1114    Order Status: Completed Specimen: Blood Updated: 05/25/24 1300     Blood Culture No Growth At 24 Hours             See below for Radiology    Scheduled Med:   albuterol-ipratropium  3 mL Nebulization Q4H    ALPRAZolam  0.25 mg Oral TID    amLODIPine  10 mg Oral Daily    aspirin  81 mg Oral Daily    atorvastatin  80 mg Oral Daily    azithromycin  250 mg Oral Every Mon, Wed, Fri    cefTRIAXone (Rocephin) IV (PEDS and ADULTS)  1 g Intravenous Q24H    dicyclomine  20 mg Oral Daily    diltiaZEM  120 mg Oral Daily    doxycycline IV (PEDS and ADULTS)  100 mg Intravenous Q12H    enoxparin  1 mg/kg Subcutaneous Q24H    famotidine  20 mg Oral QHS    mirtazapine  45 mg Oral Nightly    polyethylene glycol  17 g Oral Daily    senna-docusate 8.6-50 mg  1 tablet Oral Daily    vortioxetine  10 mg Oral Daily      Continuous Infusions:   0.45 % NaCl with KCl 20 mEq   Intravenous Continuous 150 mL/hr at 05/25/24 1022 New Bag at 05/25/24 1022      PRN Meds:    Current Facility-Administered Medications:     acetaminophen, 650 mg, Oral, Q4H PRN    hydrALAZINE, 10 mg, Intravenous, Q4H PRN    melatonin, 6 mg, Oral, Nightly PRN    ondansetron, 4 mg, Intravenous, Q6H PRN    promethazine, 12.5 mg, Intramuscular, Q4H PRN     Patient meets ASPEN criteria for  (unable to eval, will attempt upon F/U) malnutrition of   per RD assessment as evidenced by:                       A minimum of two characteristics is recommended for diagnosis of either severe or non-severe malnutrition.    Assessment/Plan:  Severe hypokalemia   Metabolic alkalosis, secondary to nausea and vomiting   WISAM, likely secondary to dehydration   History of MAC, currently on  chronic suppressive therapy with azithromycin   Chronic right middle lobe atelectasis and linear atelectasis right upper lobe in a left upper lobe   Chronic obstructive pulmonary disease with severe baseline obstruction  Chronic hypoxic respiratory failure  Ca D status post stent on 11/2017  Chronic epigastric abdominal pain , GI extensive workup has been done in the past with no clear cause  Elevated troponin   Hypercalcemia, possibly likely secondary to dehydration  ? 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 Zithromax and plans are for lifetime antibiotic suppressive therapy   Slight hemoglobin drop from 11.5-9.4, likely delusional secondary to fluids   Continue Rocephin and doxycycline for possible pneumonia  Patient unable to tolerate any oral pills at this time due to dehydration.  We will change fluids to 0.45% sodium chloride with KCl 20 mEq at 150 cc/hour along with 40 mg of IV potassium   Also give 2 g of Mag   Cardiology consulted for elevated troponins, appreciate recommendations   Recommend to medically managed at this time   Add Phenergan for nausea if Zofran is on able to control  Nutrition consult, appreciate recommendations   Calcitonin x2 doses given for hypercalcemia, likely secondary to dehydration   We will increase fluids to 150 cc/hour for aggressive resuscitation and monitor closely   Recheck labs in a.m.  If hypercalcemia continues to be elevated, we will consider Zometa dose  Patient has a pretty extensive workup done for epigastric pain with recent EGD and colonoscopy done earlier this year which was negative.  Patient says  pain started after loss of her has been, suspect lot of stress/anxiety related   Pulmonology consulted due to significant pulmonary findings and CT scan and history, patient is also well known to the pulmonology group, appreciate recommendations  Palliative care consult for goals of care discussion   Further recs based on clinical course    Critical care note:  Critical care diagnosis:  Hypercalcemia needing aggressive IV fluid resuscitation  Critical care interventions: Hands-on evaluation, review of labs/radiographs/records and discussion with patient and family if present  Critical care time spent: 35 minutes         VTE prophylaxis:  Lovenox    Patient condition:  Stable/Fair/Guarded/ Serious/ Critical    Anticipated discharge and Disposition:   To be determined      All diagnosis and differential diagnosis have been reviewed; assessment and plan has been documented; I have personally reviewed the labs and test results that are presently available; I have reviewed the patients medication list; I have reviewed the consulting providers response and recommendations. I have reviewed or attempted to review medical records based upon their availability    All of the patient's questions have been  addressed and answered. Patient's is agreeable to the above stated plan. I will continue to monitor closely and make adjustments to medical management as needed.  _____________________________________________________________________    Nutrition Status:    Radiology:  I have personally reviewed the following imaging and agree with the radiologist.     Echo    Left Ventricle: The left ventricle is normal in size. Normal wall   thickness. There is normal systolic function with a visually estimated   ejection fraction of 60 - 65%. Grade I diastolic dysfunction.    Right Ventricle: Normal right ventricular cavity size. Systolic   function is normal.    Aortic Valve: There is mild aortic valve sclerosis. There is mild   aortic  regurgitation.    Mitral Valve: There is no stenosis. The mean pressure gradient across   the mitral valve is 3 mmHg at a heart rate of  bpm. There is mild   regurgitation.    Tricuspid Valve: There is mild regurgitation.    IVC/SVC: Normal venous pressure at 3 mmHg.      Cate Hairston DO  Department of Hospital Medicine  [facility]  05/25/2024

## 2024-05-25 NOTE — CONSULTS
Inpatient consult to Cardiology  Consult performed by: Elaine Thapa FNP  Consult ordered by: Jose Vázquez MD        [facility] - Observation Unit    Cardiology  Consult Note    Patient Name: Alesia Walden  MRN: [id number]  Admission Date: 5/24/2024  Hospital Length of Stay: 1 days  Code Status: No Order   Attending Provider: Jose Vázquez MD   Consulting Provider: JEANA Lincoln  Primary Care Physician: Chuck Estrella MD  Principal Problem:<principal problem not specified>    Patient information was obtained from patient, past medical records, and ER records.     Subjective:     Chief Complaint/Reason for Consult: NSTEMI    HPI:   Ms. Walden is a 74 y/o female, followed by Dr. Amaya, who presented to ED with c/o SOB, generalized weakness, N/V and chronic epigastric pain. CTA of chest negative for PE but did reveal severe narrowing of right distal main Pulmonary artery, near complete occlusion of SHAYLA superior segment pulmonary artery s/t left suprahilar mass. Lab significant for K+ 2.4, BUN/creatinine 24.2/1.22, troponin 0.019-0.502-0.443. She was started on ABX/nebs, K+ supplemented and admitted to hospital medicine. CIS  consulted due to NSTEMI      PMH: Native CAD/PCI dLAD, HTN, VHD- AR, COPD/Home O2, HLD, GERD, fungal infection of lung, FAVIOLA, PADMAJA, Prior MAC infection/ chronic suppressive therapy  PSH: LHC, L GSV ablation, hysterectomy, colon surgery, bladder surgery, left ankle joint replacement, colonoscopy  Family History: Mother- MI;   Social History: never smoker    Previous Cardiac Diagnostics:   TTE 05.24.24:  Left Ventricle: The left ventricle is normal in size. Normal wall thickness. There is normal systolic function with a visually estimated ejection fraction of 60 - 65%. Grade I diastolic dysfunction.    Right Ventricle: Normal right ventricular cavity size. Systolic function is normal.    Aortic Valve: There is mild aortic valve sclerosis. There is mild aortic  regurgitation.    Mitral Valve: There is no stenosis. The mean pressure gradient across the mitral valve is 3 mmHg at a heart rate of  bpm. There is mild regurgitation.    Tricuspid Valve: There is mild regurgitation.    IVC/SVC: Normal venous pressure at 3 mmHg.     This scan is suggestive of moderate risk for future cardiovascular events.    Medium reversible perfusion abnormality of moderate intensity in the anterior region.    Perfusion imaging is suggestive of single vessel disease. Perfusion defect is in the distribution of left anterior descending artery.    The left ventricular cavity is noted to be normal on the stress studies. The stress left ventricular ejection fraction was calculated to be 74% and left ventricular global function is normal. The rest left ventricular cavity is noted to be normal. The rest left ventricular ejection fraction was calculated to be 77% and rest left ventricular global function is normal.    When compared to the resting ejection fraction (77%), the stress ejection fraction (74%) has decreased.    Transient ischemic dilatation is present and has been described as a marker for high risk coronary artery disease. It has also been described in microvascular disease, hypertensive heart disease as well as cardiac deconditioning.    CUS 06.06.23:  1. The study quality is average.   2. 1-39% stenosis in the proximal right internal carotid artery based on Bluth Criteria. Antegrade right vertebral artery flow.   3. 1-39% stenosis in the proximal left internal carotid artery based on Bluth Criteria. Antegrade left vertebral artery flow.   4. 1-39% stenosis in the proximal left internal carotid artery based on Bluth Criteria.   5. 1-39% stenosis in the proximal right internal carotid artery based on Bluth Criteria.   6. Antegrade left vertebral artery flow.   7. Antegrade right vertebral artery flow.     TTE 06.06.23:  1. The study quality is average.   2. The left ventricle is normal in  size. Global left ventricular systolic function is normal. The left ventricular ejection fraction is 60%. Left ventricular diastolic function is normal.   3. Mild (1+) mitral regurgitation.   4. Mild to moderate (1-2+) aortic regurgitation. Mild calcification of the aortic valve is noted with adequate cuspal excursion.   5. The pulmonary artery systolic pressure is 43 mmHg.     [facility] 07.18.18:  LM: normal  LAD: patent dLAD stent; mid LAD 35% stenosis  Lcx: normal  RCA: Normal  Ramus: Normal    Past Medical History:   Diagnosis Date    Abdominal pain, LLQ     Abdominal pain, RLQ     Acute bilateral low back pain with left-sided sciatica     Anxiety     Cardiac microvascular disease     Carotid bruit     Constipation, unspecified     COPD (chronic obstructive pulmonary disease)     Depressive disorder     Diverticulitis     Dyslipidemia     GERD (gastroesophageal reflux disease), chronic     Heart valve disorder     Iron deficiency anemia     Moderate aortic regurgitation     Primary hypertension     PTCA with stent insertion     Shortness of breath     Status post laser ablation of incompetent vein     Thyroid enlarged     Varicose veins of bilateral lower extremities with other complications     Venous insufficiency      Past Surgical History:   Procedure Laterality Date    COLONOSCOPY N/A 2/6/2023    Procedure: COLON;  Surgeon: Dorothea Davey III, MD;  Location: [facility] ENDOSCOPY;  Service: Gastroenterology;  Laterality: N/A;    COLONOSCOPY, WITH POLYPECTOMY USING SNARE  2/6/2023    Procedure: COLONOSCOPY, WITH POLYPECTOMY USING SNARE;  Surgeon: Dorothea Davey III, MD;  Location: [facility] ENDOSCOPY;  Service: Gastroenterology;;    EGD, WITH CLOSED BIOPSY N/A 3/7/2023    Procedure: EGD;  Surgeon: Dorothea Davey III, MD;  Location: [facility] ENDOSCOPY;  Service: Gastroenterology;  Laterality: N/A;  Pre OP packet for procedure was mailed to patient     Review of patient's allergies indicates:   Allergen Reactions     No known drug allergies      No current facility-administered medications on file prior to encounter.     Current Outpatient Medications on File Prior to Encounter   Medication Sig    albuterol (PROVENTIL/VENTOLIN HFA) 90 mcg/actuation inhaler Inhale 1-2 puffs into the lungs every 6 (six) hours as needed for Wheezing. Rescue    albuterol-ipratropium (DUO-NEB) 2.5 mg-0.5 mg/3 mL nebulizer solution USE 1 AMPULE IN NEBULIZER ONCE DAILY FOLLOWING AMIKACIN NEBULIZATION    ALPRAZolam (XANAX) 0.5 MG tablet Take 1/2 tablet in am, 1/2 tablet at lunch and 1 tablet in pm.    amLODIPine (NORVASC) 10 MG tablet Take 10 mg by mouth.    aspirin (ECOTRIN) 81 MG EC tablet tablet    cyclobenzaprine (FLEXERIL) 10 MG tablet Take 1 tablet (10 mg total) by mouth every evening.    dicyclomine (BENTYL) 20 mg tablet Take 1 tablet by mouth once daily.    diphenoxylate-atropine 2.5-0.025 mg (LOMOTIL) 2.5-0.025 mg per tablet Take 1 tablet by mouth 3 (three) times daily as needed.    ergocalciferol (ERGOCALCIFEROL) 50,000 unit Cap Take 1 capsule by mouth once a week    omeprazole (PRILOSEC) 40 MG capsule Take 40 mg by mouth before breakfast.    ondansetron (ZOFRAN-ODT) 8 MG TbDL DISSOLVE 1 TABLET IN MOUTH EVERY 6 HOURS AS NEEDED FOR NAUSEA    sucralfate (CARAFATE) 1 gram tablet 90 tablets.    temazepam (RESTORIL) 30 mg capsule TAKE 1 CAPSULE BY MOUTH NIGHTLY AS NEEDED FOR INSOMNIA    alendronate (FOSAMAX) 70 MG tablet TAKE 1 TABLET BY MOUTH ONCE A WEEK ON  EVERY  TUESDAY    atorvastatin (LIPITOR) 80 MG tablet Take 80 mg by mouth.    azithromycin (Z-AMANDO) 250 MG tablet Take 1 tablet (250 mg total) by mouth every Mon, Wed, Fri.    Bifidobacterium infantis (ALIGN) 4 mg Cap capsule    diltiaZEM (CARDIZEM CD) 120 MG Cp24 Take 120 mg by mouth.    DULoxetine (CYMBALTA) 60 MG capsule Take 1 capsule by mouth once daily (Patient not taking: Reported on 5/20/2024)    estradiol 0.025 mg/24 hr 0.025 mg/24 hr APPLY 1 PATCH TOPICALLY ONCE A WEEK EVERY  FRIDAY     famotidine (PEPCID) 40 MG tablet Take 40 mg by mouth every evening.    hyoscyamine (LEVSIN/SL) 0.125 mg Subl 60 tablets. (Patient not taking: Reported on 5/20/2024)    iron bis-gly/FA/C/B12/Ca/succ (IRON-150 ORAL) Take by mouth.    L.acidophilus-B.bifidum,longum 150 mg (3 billion cell) Chew     mirtazapine (REMERON) 45 MG tablet Take 45 mg by mouth.    mupirocin (BACTROBAN) 2 % ointment APPLY OINTMENT TOPICALLY TWICE DAILY TO SORES    pantoprazole (PROTONIX) 40 MG tablet TAKE 1 TABLET BY MOUTH TWICE DAILY TAKE AT LEAST 30 MINUTES BEFORE MEAL BREAKFAST AND EVENINGS MEALS    REXULTI 2 mg Tab Take 1 tablet by mouth once daily    roflumilast (DALIRESP) 500 mcg Tab Take 1 tablet (500 mcg total) by mouth once daily.    sodium chloride 3% 3 % nebulizer solution USE 4 ML IN NEBULIZER ONCE DAILY    sodium chloride 3% 3 % nebulizer solution Inhale 4 mLs into the lungs 2 (two) times daily as needed.    umeclidinium-vilanteroL (ANORO ELLIPTA) 62.5-25 mcg/actuation DsDv Inhale 1 puff into the lungs once daily. Controller    valsartan (DIOVAN) 160 MG tablet Take 160 mg by mouth once daily.    vortioxetine (TRINTELLIX) 10 mg Tab Take 1 tablet by mouth daily.     Family History       Problem Relation (Age of Onset)    COPD Father    Heart attack Mother    No Known Problems Brother          Tobacco Use    Smoking status: Never     Passive exposure: Never    Smokeless tobacco: Never   Substance and Sexual Activity    Alcohol use: Never    Drug use: Never    Sexual activity: Not Currently       Review of Systems   Constitutional: Negative.    Respiratory:  Positive for shortness of breath.    Cardiovascular: Negative.    Gastrointestinal: Negative.    Musculoskeletal: Negative.    Neurological: Negative.    Psychiatric/Behavioral: Negative.       Objective:     Vital Signs (Most Recent):  Temp: 98.2 °F (36.8 °C) (05/25/24 0338)  Pulse: 69 (05/25/24 0350)  Resp: 18 (05/25/24 0350)  BP: (!) 139/53 (05/25/24 0338)  SpO2: 98 %  (05/25/24 2160) Vital Signs (24h Range):  Temp:  [98.1 °F (36.7 °C)-99.9 °F (37.7 °C)] 98.2 °F (36.8 °C)  Pulse:  [52-90] 69  Resp:  [12-27] 18  SpO2:  [93 %-100 %] 98 %  BP: (139-183)/(53-71) 139/53   Weight: 38.6 kg (85 lb)  Body mass index is 15.06 kg/m².  SpO2: 98 %       Intake/Output Summary (Last 24 hours) at 5/25/2024 0619  Last data filed at 5/24/2024 2205  Gross per 24 hour   Intake 240 ml   Output --   Net 240 ml     Lines/Drains/Airways       Peripheral Intravenous Line  Duration                  Peripheral IV - Single Lumen 05/25/24 0430 20 G Anterior;Right Forearm <1 day                  Significant Labs:  Recent Results (from the past 72 hour(s))   EKG 12-lead    Collection Time: 05/24/24 10:50 AM   Result Value Ref Range    QRS Duration 150 ms    [facility] QTC Calculation 392 ms   Comprehensive metabolic panel    Collection Time: 05/24/24 11:09 AM   Result Value Ref Range    Sodium 145 136 - 145 mmol/L    Potassium 2.3 (LL) 3.5 - 5.1 mmol/L    Chloride 101 98 - 107 mmol/L    CO2 34 (H) 23 - 31 mmol/L    Glucose 104 82 - 115 mg/dL    Blood Urea Nitrogen 26.8 (H) 9.8 - 20.1 mg/dL    Creatinine 1.15 (H) 0.55 - 1.02 mg/dL    Calcium 14.2 (HH) 8.4 - 10.2 mg/dL    Protein Total 6.5 5.8 - 7.6 gm/dL    Albumin 3.1 (L) 3.4 - 4.8 g/dL    Globulin 3.4 2.4 - 3.5 gm/dL    Albumin/Globulin Ratio 0.9 (L) 1.1 - 2.0 ratio    Bilirubin Total 0.8 <=1.5 mg/dL    ALP 72 40 - 150 unit/L    ALT 11 0 - 55 unit/L    AST 18 5 - 34 unit/L    eGFR 51 mL/min/1.73/m2    Anion Gap 10.0 mEq/L    BUN/Creatinine Ratio 23    Troponin I    Collection Time: 05/24/24 11:09 AM   Result Value Ref Range    Troponin-I 0.019 0.000 - 0.045 ng/mL   Brain natriuretic peptide    Collection Time: 05/24/24 11:09 AM   Result Value Ref Range    Natriuretic Peptide 143.8 (H) <=100.0 pg/mL   Magnesium    Collection Time: 05/24/24 11:09 AM   Result Value Ref Range    Magnesium Level 2.20 1.60 - 2.60 mg/dL   Protime-INR    Collection Time: 05/24/24 11:09 AM    Result Value Ref Range    PT 14.0 12.5 - 14.5 seconds    INR 1.1 <=1.3   COVID/RSV/FLU A&B PCR    Collection Time: 05/24/24 11:09 AM   Result Value Ref Range    Influenza A PCR Not Detected Not Detected    Influenza B PCR Not Detected Not Detected    Respiratory Syncytial Virus PCR Not Detected Not Detected    SARS-CoV-2 PCR Not Detected Not Detected, Negative   Respiratory Panel    Collection Time: 05/24/24 11:09 AM   Result Value Ref Range    Adenovirus Not Detected Not Detected    Coronavirus 229E Not Detected Not Detected    Coronavirus HKU1 Not Detected Not Detected    Coronavirus NL63 Not Detected Not Detected    Coronavirus OC43 PCR, Common Cold Virus Not Detected Not Detected    Human Metapneumovirus Not Detected Not Detected    Parainfluenza Virus 1 Not Detected Not Detected    Parainfluenza Virus 2 Not Detected Not Detected    Parainfluenza Virus 3 Not Detected Not Detected    Parainfluenza Virus 4 Not Detected Not Detected    Bordetella pertussis (ptxP) Not Detected Not Detected    Chlamydia pneumoniae Not Detected Not Detected    Mycoplasma pneumoniae Not Detected Not Detected    Human Rhinovirus/Enterovirus Not Detected Not Detected    Bordetella parapertussis (KY8560) Not Detected Not Detected   CBC with Differential    Collection Time: 05/24/24 11:09 AM   Result Value Ref Range    WBC 5.72 4.50 - 11.50 x10(3)/mcL    RBC 4.28 4.20 - 5.40 x10(6)/mcL    Hgb 11.5 (L) 12.0 - 16.0 g/dL    Hct 35.2 (L) 37.0 - 47.0 %    MCV 82.2 80.0 - 94.0 fL    MCH 26.9 (L) 27.0 - 31.0 pg    MCHC 32.7 (L) 33.0 - 36.0 g/dL    RDW 13.8 11.5 - 17.0 %    Platelet 225 130 - 400 x10(3)/mcL    MPV 9.9 7.4 - 10.4 fL    Neut % 73.9 %    Lymph % 13.1 %    Mono % 11.0 %    Eos % 1.2 %    Basophil % 0.3 %    Lymph # 0.75 0.6 - 4.6 x10(3)/mcL    Neut # 4.22 2.1 - 9.2 x10(3)/mcL    Mono # 0.63 0.1 - 1.3 x10(3)/mcL    Eos # 0.07 0 - 0.9 x10(3)/mcL    Baso # 0.02 <=0.2 x10(3)/mcL    IG# 0.03 0 - 0.04 x10(3)/mcL    IG% 0.5 %    NRBC% 0.0  %   Vitamin D    Collection Time: 05/24/24 11:09 AM   Result Value Ref Range    Vitamin D 84 (H) 30 - 80 ng/mL   PTH, Intact    Collection Time: 05/24/24 11:09 AM   Result Value Ref Range    PARATHYROID HORMONE INTACT 31.3 8.7 - 77.0 pg/mL   Phosphorus    Collection Time: 05/24/24 11:09 AM   Result Value Ref Range    Phosphorus Level 3.2 2.3 - 4.7 mg/dL   Lactic acid, plasma    Collection Time: 05/24/24 11:13 AM   Result Value Ref Range    Lactic Acid Level 1.3 0.5 - 2.2 mmol/L   EKG 12-lead    Collection Time: 05/24/24  1:13 PM   Result Value Ref Range    QRS Duration 186 ms    OHS QTC Calculation 743 ms   Echo    Collection Time: 05/24/24  5:20 PM   Result Value Ref Range    BSA 1.31 m2    Knight's Biplane MOD Ejection Fraction 63 %    LVOT stroke volume 96.71 cm3    LVIDd 4.41 3.5 - 6.0 cm    LV Systolic Volume 28.50 mL    LV Systolic Volume Index 21.3 mL/m2    LVIDs 2.76 2.1 - 4.0 cm    LV Diastolic Volume 88.20 mL    LV Diastolic Volume Index 65.82 mL/m2    IVS 0.93 0.6 - 1.1 cm    LVOT diameter 2.00 cm    LVOT area 3.1 cm2    FS 37 28 - 44 %    Left Ventricle Relative Wall Thickness 0.46 cm    Posterior Wall 1.01 0.6 - 1.1 cm    LV mass 142.27 g    LV Mass Index 106 g/m2    MV Peak E Harmeet 0.57 m/s    TDI LATERAL 0.09 m/s    TDI SEPTAL 0.04 m/s    E/E' ratio 8.77 m/s    MV Peak A Harmeet 1.28 m/s    TR Max Harmeet 2.94 m/s    E/A ratio 0.45     E wave deceleration time 198.00 msec    LV SEPTAL E/E' RATIO 14.25 m/s    LV LATERAL E/E' RATIO 6.33 m/s    LVOT peak harmeet 1.38 m/s    Left Ventricular Outflow Tract Mean Velocity 0.93 cm/s    Left Ventricular Outflow Tract Mean Gradient 4.00 mmHg    TAPSE 2.21 cm    LA size 3.50 cm    LA volume (mod) 16.10 cm3    LA Volume Index (Mod) 12.0 mL/m2    AV regurgitation pressure 1/2 time 487 ms    AR Max Harmeet 4.33 m/s    AV mean gradient 7 mmHg    AV peak gradient 12 mmHg    Ao peak harmeet 1.70 m/s    Ao VTI 38.70 cm    LVOT peak VTI 30.80 cm    AV valve area 2.50 cm²    AV Velocity  Ratio 0.81     AV index (prosthetic) 0.80     ERIK by Velocity Ratio 2.55 cm²    MV mean gradient 3 mmHg    MV peak gradient 6 mmHg    MV stenosis pressure 1/2 time 56.00 ms    MV valve area p 1/2 method 3.93 cm2    MV valve area by continuity eq 2.51 cm2    MV VTI 38.6 cm    Triscuspid Valve Regurgitation Peak Gradient 35 mmHg    PV PEAK VELOCITY 0.91 m/s    PV peak gradient 3 mmHg    Mean e' 0.07 m/s    ZLVIDS 0.25     ZLVIDD 0.25     TV resting pulmonary artery pressure 38 mmHg    RV TB RVSP 6 mmHg    Est. RA pres 3 mmHg   Basic Metabolic Panel    Collection Time: 05/24/24  7:06 PM   Result Value Ref Range    Sodium 146 (H) 136 - 145 mmol/L    Potassium 2.4 (LL) 3.5 - 5.1 mmol/L    Chloride 102 98 - 107 mmol/L    CO2 35 (H) 23 - 31 mmol/L    Glucose 122 (H) 82 - 115 mg/dL    Blood Urea Nitrogen 24.2 (H) 9.8 - 20.1 mg/dL    Creatinine 1.22 (H) 0.55 - 1.02 mg/dL    BUN/Creatinine Ratio 20     Calcium 14.2 (HH) 8.4 - 10.2 mg/dL    Anion Gap 9.0 mEq/L    eGFR 47 mL/min/1.73/m2   Troponin I    Collection Time: 05/24/24  7:06 PM   Result Value Ref Range    Troponin-I 0.502 (H) 0.000 - 0.045 ng/mL   CK    Collection Time: 05/24/24  7:06 PM   Result Value Ref Range    Creatine Kinase 95 29 - 168 U/L   CK-MB    Collection Time: 05/24/24  7:06 PM   Result Value Ref Range    Creatine Kinase MB 2.5 <=3.4 ng/mL   Comprehensive Metabolic Panel    Collection Time: 05/25/24  2:46 AM   Result Value Ref Range    Sodium 145 136 - 145 mmol/L    Potassium 2.4 (LL) 3.5 - 5.1 mmol/L    Chloride 104 98 - 107 mmol/L    CO2 33 (H) 23 - 31 mmol/L    Glucose 126 (H) 82 - 115 mg/dL    Blood Urea Nitrogen 20.3 (H) 9.8 - 20.1 mg/dL    Creatinine 1.05 (H) 0.55 - 1.02 mg/dL    Calcium 12.7 (HH) 8.4 - 10.2 mg/dL    Protein Total 5.9 5.8 - 7.6 gm/dL    Albumin 2.8 (L) 3.4 - 4.8 g/dL    Globulin 3.1 2.4 - 3.5 gm/dL    Albumin/Globulin Ratio 0.9 (L) 1.1 - 2.0 ratio    Bilirubin Total 0.5 <=1.5 mg/dL    ALP 67 40 - 150 unit/L    ALT 13 0 - 55 unit/L     AST 22 5 - 34 unit/L    eGFR 57 mL/min/1.73/m2    Anion Gap 8.0 mEq/L    BUN/Creatinine Ratio 19    Troponin I    Collection Time: 05/25/24  2:46 AM   Result Value Ref Range    Troponin-I 0.443 (H) 0.000 - 0.045 ng/mL   CBC with Differential    Collection Time: 05/25/24  2:46 AM   Result Value Ref Range    WBC 6.33 4.50 - 11.50 x10(3)/mcL    RBC 3.49 (L) 4.20 - 5.40 x10(6)/mcL    Hgb 9.4 (L) 12.0 - 16.0 g/dL    Hct 28.5 (L) 37.0 - 47.0 %    MCV 81.7 80.0 - 94.0 fL    MCH 26.9 (L) 27.0 - 31.0 pg    MCHC 33.0 33.0 - 36.0 g/dL    RDW 14.3 11.5 - 17.0 %    Platelet 258 130 - 400 x10(3)/mcL    MPV 10.0 7.4 - 10.4 fL    Neut % 76.3 %    Lymph % 10.6 %    Mono % 11.1 %    Eos % 1.1 %    Basophil % 0.6 %    Lymph # 0.67 0.6 - 4.6 x10(3)/mcL    Neut # 4.83 2.1 - 9.2 x10(3)/mcL    Mono # 0.70 0.1 - 1.3 x10(3)/mcL    Eos # 0.07 0 - 0.9 x10(3)/mcL    Baso # 0.04 <=0.2 x10(3)/mcL    IG# 0.02 0 - 0.04 x10(3)/mcL    IG% 0.3 %    NRBC% 0.0 %     Significant Imaging:  Imaging Results              US Retroperitoneal Complete (Final result)  Result time 05/24/24 15:59:38      Final result by Tomasa Taylor MD (05/24/24 15:59:38)                   Impression:      Mild bilateral hydronephrosis.      Electronically signed by: Tomasa Taylor  Date:    05/24/2024  Time:    15:59               Narrative:    EXAMINATION:  US RETROPERITONEAL COMPLETE    CLINICAL HISTORY:  Bilateral hydronephrosis on CT not well visualized;    TECHNIQUE:  Ultrasound evaluation of the kidneys, region of the ureters, and urinary bladder    COMPARISON:  Ultrasound dated 04/15/2024, CT chest dated 05/24/2024    FINDINGS:  The right kidney measures 10.4 cm with a 1.6 cm angiomyolipoma.  The left kidney measures 8.3 cm. There is mild bilateral hydronephrosis.    The urinary bladder is unremarkable.  Both urinary jets are visualized.    No significant findings within the visualized segments of the abdominal aorta and IVC.                                        CTA Chest Non-Coronary (PE Studies) (Final result)  Result time 05/24/24 13:00:44      Final result by Ariana Rai MD (05/24/24 13:00:44)                   Impression:      No pulmonary embolism seen but there is severe narrowing of the right distal main pulmonary artery as well as proximal right upper and lower lobe pulmonary arteries.  There is also near complete occlusion of the left upper lobe superior segment pulmonary artery secondary to the left suprahilar mass    Bilateral lung masses as outlined above with associated areas of scattered nodularity as outlined above overall stable since the prior examination    Bilateral hydronephrosis not well evaluated on this examination      Electronically signed by: Jonathan Rai  Date:    05/24/2024  Time:    13:00               Narrative:    EXAMINATION:  CTA CHEST NON CORONARY (PE STUDIES)    CLINICAL HISTORY:  Pulmonary embolism (PE) suspected, high prob;    TECHNIQUE:  Low dose axial images, sagittal and coronal reformations were obtained from the thoracic inlet to the lung bases following the IV administration of contrast..  Contrast timing was optimized to evaluate the pulmonary arteries.  MIP images were performed.  Automated exposure control was utilized    COMPARISON:  03/07/2024    FINDINGS:  There is a large mass seen in the right suprahilar region with associated areas of calcification.  There is also some atelectasis in the right upper lobe.  This was seen on the prior examination as well.  There is a left suprahilar spiculated mass seen as well.  It appears the slightly more prominent than the prior examination.  There is some associated calcifications.  Scattered areas nodularity is seen in the upper lobes which were seen on the prior examination as well.  There is an area of nodular scarring and nodularity in the left lower lobe medially and inferiorly.  Similar changes were seen previously.  There are changes consistent with COPD and  emphysema in the lungs bilaterally.  There is a left infrahilar mass seen which is also partially calcified but unchanged.    No pulmonary embolism is seen but there is significant narrowing of the right main distal pulmonary artery as well as the right upper lobe and right lower lobe central branches.  There is essentially the complete occlusion of the left upper lobe superior segment branches from the mass in the left suprahilar region.    The heart is normal in size.  Thoracic aorta is normal in caliber.  Some atherosclerotic plaque is seen.  No dissection or aneurysm is seen.    Upper abdomen shows evidence of bilateral hydronephrosis which are not well evaluated on this examination.  There is a fat containing lipomatous lesion in the upper pole the right kidney.                                       X-Ray Chest AP Portable (Final result)  Result time 05/24/24 11:57:20      Final result by Prabhakar Blevins MD (05/24/24 11:57:20)                   Impression:      No significant interval change.      Electronically signed by: Prabhakar Blevins  Date:    05/24/2024  Time:    11:57               Narrative:    EXAMINATION:  XR CHEST AP PORTABLE    CLINICAL HISTORY:  Shortness of breath    TECHNIQUE:  One view    COMPARISON:  May 2, 2024.    FINDINGS:  Bilateral hilar and perihilar irregular defined masslike appearance is similar.  No superimposed acute congestion, consolidation or pleural effusions.  No pneumothorax.  Cardiopericardial silhouette is within normal limits.                                    EKG:         Physical Exam  Cardiovascular:      Rate and Rhythm: Normal rate and regular rhythm.      Pulses: Normal pulses.      Heart sounds: Normal heart sounds.   Pulmonary:      Breath sounds: Normal breath sounds.   Abdominal:      Palpations: Abdomen is soft.   Musculoskeletal:         General: Normal range of motion.   Skin:     General: Skin is warm.      Capillary Refill: Capillary refill takes less than 2  seconds.   Neurological:      General: No focal deficit present.      Mental Status: She is alert.   Psychiatric:         Mood and Affect: Mood normal.       Home Medications:   No current facility-administered medications on file prior to encounter.     Current Outpatient Medications on File Prior to Encounter   Medication Sig Dispense Refill    albuterol (PROVENTIL/VENTOLIN HFA) 90 mcg/actuation inhaler Inhale 1-2 puffs into the lungs every 6 (six) hours as needed for Wheezing. Rescue 27 g 11    albuterol-ipratropium (DUO-NEB) 2.5 mg-0.5 mg/3 mL nebulizer solution USE 1 AMPULE IN NEBULIZER ONCE DAILY FOLLOWING AMIKACIN NEBULIZATION 75 mL 11    ALPRAZolam (XANAX) 0.5 MG tablet Take 1/2 tablet in am, 1/2 tablet at lunch and 1 tablet in pm. 180 tablet 1    amLODIPine (NORVASC) 10 MG tablet Take 10 mg by mouth.      aspirin (ECOTRIN) 81 MG EC tablet tablet      cyclobenzaprine (FLEXERIL) 10 MG tablet Take 1 tablet (10 mg total) by mouth every evening. 30 tablet 5    dicyclomine (BENTYL) 20 mg tablet Take 1 tablet by mouth once daily.      diphenoxylate-atropine 2.5-0.025 mg (LOMOTIL) 2.5-0.025 mg per tablet Take 1 tablet by mouth 3 (three) times daily as needed.      ergocalciferol (ERGOCALCIFEROL) 50,000 unit Cap Take 1 capsule by mouth once a week 12 capsule 0    omeprazole (PRILOSEC) 40 MG capsule Take 40 mg by mouth before breakfast.      ondansetron (ZOFRAN-ODT) 8 MG TbDL DISSOLVE 1 TABLET IN MOUTH EVERY 6 HOURS AS NEEDED FOR NAUSEA      sucralfate (CARAFATE) 1 gram tablet 90 tablets.      temazepam (RESTORIL) 30 mg capsule TAKE 1 CAPSULE BY MOUTH NIGHTLY AS NEEDED FOR INSOMNIA 30 capsule 5    alendronate (FOSAMAX) 70 MG tablet TAKE 1 TABLET BY MOUTH ONCE A WEEK ON  EVERY  TUESDAY 12 tablet 1    atorvastatin (LIPITOR) 80 MG tablet Take 80 mg by mouth.      azithromycin (Z-AMANDO) 250 MG tablet Take 1 tablet (250 mg total) by mouth every Mon, Wed, Fri. 36 tablet 11    Bifidobacterium infantis (ALIGN) 4 mg Cap capsule       diltiaZEM (CARDIZEM CD) 120 MG Cp24 Take 120 mg by mouth.      DULoxetine (CYMBALTA) 60 MG capsule Take 1 capsule by mouth once daily (Patient not taking: Reported on 5/20/2024) 90 capsule 0    estradiol 0.025 mg/24 hr 0.025 mg/24 hr APPLY 1 PATCH TOPICALLY ONCE A WEEK EVERY  FRIDAY 12 patch 0    famotidine (PEPCID) 40 MG tablet Take 40 mg by mouth every evening.      hyoscyamine (LEVSIN/SL) 0.125 mg Subl 60 tablets. (Patient not taking: Reported on 5/20/2024)      iron bis-gly/FA/C/B12/Ca/succ (IRON-150 ORAL) Take by mouth.      L.acidophilus-B.bifidum,longum 150 mg (3 billion cell) Chew       mirtazapine (REMERON) 45 MG tablet Take 45 mg by mouth.      mupirocin (BACTROBAN) 2 % ointment APPLY OINTMENT TOPICALLY TWICE DAILY TO SORES      pantoprazole (PROTONIX) 40 MG tablet TAKE 1 TABLET BY MOUTH TWICE DAILY TAKE AT LEAST 30 MINUTES BEFORE MEAL BREAKFAST AND EVENINGS MEALS      REXULTI 2 mg Tab Take 1 tablet by mouth once daily 30 tablet 0    roflumilast (DALIRESP) 500 mcg Tab Take 1 tablet (500 mcg total) by mouth once daily. 30 tablet 11    sodium chloride 3% 3 % nebulizer solution USE 4 ML IN NEBULIZER ONCE DAILY      sodium chloride 3% 3 % nebulizer solution Inhale 4 mLs into the lungs 2 (two) times daily as needed.      umeclidinium-vilanteroL (ANORO ELLIPTA) 62.5-25 mcg/actuation DsDv Inhale 1 puff into the lungs once daily. Controller 60 each 11    valsartan (DIOVAN) 160 MG tablet Take 160 mg by mouth once daily.      vortioxetine (TRINTELLIX) 10 mg Tab Take 1 tablet by mouth daily. 30 tablet 1     Current Inpatient Medications:    Current Facility-Administered Medications:     0.9%  NaCl infusion, , Intravenous, Continuous, Jose Vázquez MD, Last Rate: 75 mL/hr at 05/25/24 0042, New Bag at 05/25/24 0042    acetaminophen tablet 650 mg, 650 mg, Oral, Q4H PRN, Cortez, Wanda E., PA-C    albuterol-ipratropium 2.5 mg-0.5 mg/3 mL nebulizer solution 3 mL, 3 mL, Nebulization, Q4H, Jose Vázquez MD, 3 mL at  05/25/24 0350    amLODIPine tablet 10 mg, 10 mg, Oral, Daily, Jose Vázquez MD    aspirin EC tablet 81 mg, 81 mg, Oral, Daily, Jose Vázquez MD    atorvastatin tablet 80 mg, 80 mg, Oral, Daily, Jose Vázquez MD    azithromycin tablet 250 mg, 250 mg, Oral, Every Mon, Wed, Fri, Jose Vázquez MD    calcitonin injection 154 Units, 4 Units/kg (Dosing Weight), Subcutaneous, Q12H, Jose Vázquez MD, 154 Units at 05/25/24 0039    cefTRIAXone (Rocephin) 1 g in dextrose 5 % in water (D5W) 100 mL IVPB (MB+), 1 g, Intravenous, Q24H, Jose Vázquez MD, Stopped at 05/25/24 0137    dicyclomine tablet 20 mg, 20 mg, Oral, Daily, Jose Vázquez MD    diltiaZEM 24 hr capsule 120 mg, 120 mg, Oral, Daily, Jose Vázquez MD    doxycycline 100 mg in dextrose 5 % in water (D5W) 100 mL IVPB (MB+), 100 mg, Intravenous, Q12H, Jose Vázquez MD, Last Rate: 100 mL/hr at 05/25/24 0516, 100 mg at 05/25/24 0516    enoxaparin injection 40 mg, 1 mg/kg, Subcutaneous, Q24H, Jose Vázquez MD, 40 mg at 05/25/24 0516    famotidine tablet 20 mg, 20 mg, Oral, QHS, Jose Vázquez MD, 20 mg at 05/24/24 2058    hydrALAZINE injection 10 mg, 10 mg, Intravenous, Q4H PRN, Wanda Cortez PA-C, 10 mg at 05/25/24 0046    melatonin tablet 6 mg, 6 mg, Oral, Nightly PRN, Lamar Matias AGACNP-BC, 6 mg at 05/24/24 2058    mirtazapine tablet 45 mg, 45 mg, Oral, Nightly, Jose Vázquez MD, 45 mg at 05/24/24 2058    ondansetron injection 4 mg, 4 mg, Intravenous, Q6H PRN, Wanda Cortez PA-C, 4 mg at 05/25/24 0149    potassium chloride SA CR tablet 40 mEq, 40 mEq, Oral, BID, Jose Vázquez MD, 40 mEq at 05/24/24 2058  VTE Risk Mitigation (From admission, onward)           Ordered     enoxaparin injection 40 mg  Every 24 hours (non-standard times)         05/24/24 2017                  Assessment:   NSTEMI, unspecified  --troponin peak 0.502  Native CAD  --C 07/18/18: patent dLAD stent; mLAD 35% stenosis  --PET 07/06/23: reversible perfusion  abnormality in anterior region  Grade I Diastolic dysfunction  --BNP--143  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     JEANA Lincoln  Cardiology  [facility]  05/25/2024     Pt seen and examined by me, Lucien Ha MD. I have reviewed the NP's note, assessment and plan. I have personally interviewed and examined the patient at bedside and agree with the findings. Medical decision making listed above were done under my guidance.     Physical exam:  NAD  Cardiovascular system: regular rhythm, no murmur.  Lungs: CTAB.  Abd: S/ST/ND  Extremities: No leg edema.      Assessment and Plan:  Patient seen and evaluated.  Abnormal perfusion imaging 2023 with questionable lad ischemia that was being managed medically.  If patient requires invasive workup for pulmonary issues would consider LHC prior to cardiac risk assessment.  We will continue to follow

## 2024-05-25 NOTE — PLAN OF CARE
Problem: Adult Inpatient Plan of Care  Goal: Plan of Care Review  Outcome: Progressing  Goal: Patient-Specific Goal (Individualized)  Outcome: Progressing  Goal: Absence of Hospital-Acquired Illness or Injury  Outcome: Progressing  Goal: Optimal Comfort and Wellbeing  Outcome: Progressing  Goal: Readiness for Transition of Care  Outcome: Progressing     Problem: Coping Ineffective  Goal: Effective Coping  Outcome: Progressing     Problem: Skin Injury Risk Increased  Goal: Skin Health and Integrity  Outcome: Progressing

## 2024-05-26 LAB
ALBUMIN SERPL-MCNC: 2.8 G/DL (ref 3.4–4.8)
ALBUMIN/GLOB SERPL: 0.9 RATIO (ref 1.1–2)
ALP SERPL-CCNC: 62 UNIT/L (ref 40–150)
ALT SERPL-CCNC: 16 UNIT/L (ref 0–55)
ANION GAP SERPL CALC-SCNC: 8 MEQ/L
AST SERPL-CCNC: 36 UNIT/L (ref 5–34)
BILIRUB SERPL-MCNC: 0.4 MG/DL
BUN SERPL-MCNC: 11.4 MG/DL (ref 9.8–20.1)
CALCIUM SERPL-MCNC: 10.6 MG/DL (ref 8.4–10.2)
CHLORIDE SERPL-SCNC: 104 MMOL/L (ref 98–107)
CO2 SERPL-SCNC: 30 MMOL/L (ref 23–31)
CREAT SERPL-MCNC: 0.77 MG/DL (ref 0.55–1.02)
CREAT/UREA NIT SERPL: 15
ERYTHROCYTE [DISTWIDTH] IN BLOOD BY AUTOMATED COUNT: 14.2 % (ref 11.5–17)
GFR SERPLBLD CREATININE-BSD FMLA CKD-EPI: >60 ML/MIN/1.73/M2
GLOBULIN SER-MCNC: 3 GM/DL (ref 2.4–3.5)
GLUCOSE SERPL-MCNC: 88 MG/DL (ref 82–115)
HCT VFR BLD AUTO: 29.6 % (ref 37–47)
HGB BLD-MCNC: 9.7 G/DL (ref 12–16)
MCH RBC QN AUTO: 26.9 PG (ref 27–31)
MCHC RBC AUTO-ENTMCNC: 32.8 G/DL (ref 33–36)
MCV RBC AUTO: 82.2 FL (ref 80–94)
NRBC BLD AUTO-RTO: 0 %
PLATELET # BLD AUTO: 299 X10(3)/MCL (ref 130–400)
PMV BLD AUTO: 10 FL (ref 7.4–10.4)
POTASSIUM SERPL-SCNC: 2.8 MMOL/L (ref 3.5–5.1)
PROT SERPL-MCNC: 5.8 GM/DL (ref 5.8–7.6)
RBC # BLD AUTO: 3.6 X10(6)/MCL (ref 4.2–5.4)
SODIUM SERPL-SCNC: 142 MMOL/L (ref 136–145)
WBC # SPEC AUTO: 7.25 X10(3)/MCL (ref 4.5–11.5)

## 2024-05-26 PROCEDURE — 25000003 PHARM REV CODE 250: Performed by: NURSE PRACTITIONER

## 2024-05-26 PROCEDURE — 99900035 HC TECH TIME PER 15 MIN (STAT)

## 2024-05-26 PROCEDURE — 94760 N-INVAS EAR/PLS OXIMETRY 1: CPT

## 2024-05-26 PROCEDURE — 21400001 HC TELEMETRY ROOM

## 2024-05-26 PROCEDURE — 63600175 PHARM REV CODE 636 W HCPCS: Performed by: PHYSICIAN ASSISTANT

## 2024-05-26 PROCEDURE — 94761 N-INVAS EAR/PLS OXIMETRY MLT: CPT

## 2024-05-26 PROCEDURE — 94640 AIRWAY INHALATION TREATMENT: CPT

## 2024-05-26 PROCEDURE — 27000221 HC OXYGEN, UP TO 24 HOURS

## 2024-05-26 PROCEDURE — 25000003 PHARM REV CODE 250: Performed by: STUDENT IN AN ORGANIZED HEALTH CARE EDUCATION/TRAINING PROGRAM

## 2024-05-26 PROCEDURE — 63600175 PHARM REV CODE 636 W HCPCS: Performed by: STUDENT IN AN ORGANIZED HEALTH CARE EDUCATION/TRAINING PROGRAM

## 2024-05-26 PROCEDURE — 85027 COMPLETE CBC AUTOMATED: CPT | Performed by: STUDENT IN AN ORGANIZED HEALTH CARE EDUCATION/TRAINING PROGRAM

## 2024-05-26 PROCEDURE — 99900031 HC PATIENT EDUCATION (STAT)

## 2024-05-26 PROCEDURE — 25000242 PHARM REV CODE 250 ALT 637 W/ HCPCS: Performed by: STUDENT IN AN ORGANIZED HEALTH CARE EDUCATION/TRAINING PROGRAM

## 2024-05-26 PROCEDURE — 36415 COLL VENOUS BLD VENIPUNCTURE: CPT | Performed by: STUDENT IN AN ORGANIZED HEALTH CARE EDUCATION/TRAINING PROGRAM

## 2024-05-26 PROCEDURE — 80053 COMPREHEN METABOLIC PANEL: CPT | Performed by: STUDENT IN AN ORGANIZED HEALTH CARE EDUCATION/TRAINING PROGRAM

## 2024-05-26 RX ORDER — FAMOTIDINE 20 MG/1
20 TABLET, FILM COATED ORAL DAILY
Status: DISCONTINUED | OUTPATIENT
Start: 2024-05-27 | End: 2024-05-29 | Stop reason: HOSPADM

## 2024-05-26 RX ORDER — HYDROCORTISONE 25 MG/G
CREAM TOPICAL 2 TIMES DAILY
Status: DISCONTINUED | OUTPATIENT
Start: 2024-05-26 | End: 2024-05-29 | Stop reason: HOSPADM

## 2024-05-26 RX ORDER — POTASSIUM CHLORIDE 14.9 MG/ML
20 INJECTION INTRAVENOUS
Status: COMPLETED | OUTPATIENT
Start: 2024-05-26 | End: 2024-05-26

## 2024-05-26 RX ORDER — SCOLOPAMINE TRANSDERMAL SYSTEM 1 MG/1
1 PATCH, EXTENDED RELEASE TRANSDERMAL
Status: DISCONTINUED | OUTPATIENT
Start: 2024-05-26 | End: 2024-05-29 | Stop reason: HOSPADM

## 2024-05-26 RX ORDER — SPIRONOLACTONE 25 MG/1
25 TABLET ORAL DAILY
Status: DISCONTINUED | OUTPATIENT
Start: 2024-05-26 | End: 2024-05-29 | Stop reason: HOSPADM

## 2024-05-26 RX ORDER — ROFLUMILAST 500 UG/1
500 TABLET ORAL DAILY
Status: DISCONTINUED | OUTPATIENT
Start: 2024-05-26 | End: 2024-05-27

## 2024-05-26 RX ORDER — MEGESTROL ACETATE 20 MG/1
40 TABLET ORAL DAILY
Status: DISCONTINUED | OUTPATIENT
Start: 2024-05-26 | End: 2024-05-29 | Stop reason: HOSPADM

## 2024-05-26 RX ORDER — ISOSORBIDE MONONITRATE 30 MG/1
30 TABLET, EXTENDED RELEASE ORAL DAILY
Status: DISCONTINUED | OUTPATIENT
Start: 2024-05-27 | End: 2024-05-29 | Stop reason: HOSPADM

## 2024-05-26 RX ORDER — PANTOPRAZOLE SODIUM 40 MG/1
40 TABLET, DELAYED RELEASE ORAL DAILY
Status: DISCONTINUED | OUTPATIENT
Start: 2024-05-27 | End: 2024-05-29 | Stop reason: HOSPADM

## 2024-05-26 RX ADMIN — ONDANSETRON 4 MG: 2 INJECTION INTRAMUSCULAR; INTRAVENOUS at 01:05

## 2024-05-26 RX ADMIN — MIRTAZAPINE 45 MG: 15 TABLET, FILM COATED ORAL at 09:05

## 2024-05-26 RX ADMIN — ALPRAZOLAM 0.25 MG: 0.25 TABLET ORAL at 02:05

## 2024-05-26 RX ADMIN — DILTIAZEM HYDROCHLORIDE 120 MG: 120 CAPSULE, COATED, EXTENDED RELEASE ORAL at 09:05

## 2024-05-26 RX ADMIN — HYDROCORTISONE: 25 CREAM TOPICAL at 12:05

## 2024-05-26 RX ADMIN — POTASSIUM CHLORIDE 20 MEQ: 14.9 INJECTION, SOLUTION INTRAVENOUS at 12:05

## 2024-05-26 RX ADMIN — VORTIOXETINE 10 MG: 10 TABLET, FILM COATED ORAL at 09:05

## 2024-05-26 RX ADMIN — ENOXAPARIN SODIUM 40 MG: 40 INJECTION SUBCUTANEOUS at 05:05

## 2024-05-26 RX ADMIN — SIMETHICONE 80 MG: 80 TABLET, CHEWABLE ORAL at 09:05

## 2024-05-26 RX ADMIN — SCOPOLAMINE 1 PATCH: 1 PATCH TRANSDERMAL at 01:05

## 2024-05-26 RX ADMIN — Medication 6 MG: at 09:05

## 2024-05-26 RX ADMIN — POLYETHYLENE GLYCOL 3350 17 G: 17 POWDER, FOR SOLUTION ORAL at 09:05

## 2024-05-26 RX ADMIN — PROMETHAZINE HYDROCHLORIDE 12.5 MG: 25 INJECTION INTRAMUSCULAR; INTRAVENOUS at 12:05

## 2024-05-26 RX ADMIN — POTASSIUM CHLORIDE 20 MEQ: 14.9 INJECTION, SOLUTION INTRAVENOUS at 02:05

## 2024-05-26 RX ADMIN — ASPIRIN 81 MG: 81 TABLET, COATED ORAL at 09:05

## 2024-05-26 RX ADMIN — HYDROCORTISONE: 25 CREAM TOPICAL at 09:05

## 2024-05-26 RX ADMIN — DOXYCYCLINE 100 MG: 100 INJECTION, POWDER, LYOPHILIZED, FOR SOLUTION INTRAVENOUS at 05:05

## 2024-05-26 RX ADMIN — IPRATROPIUM BROMIDE AND ALBUTEROL SULFATE 3 ML: .5; 3 SOLUTION RESPIRATORY (INHALATION) at 12:05

## 2024-05-26 RX ADMIN — POTASSIUM CHLORIDE AND SODIUM CHLORIDE: 450; 150 INJECTION, SOLUTION INTRAVENOUS at 12:05

## 2024-05-26 RX ADMIN — ONDANSETRON 4 MG: 2 INJECTION INTRAMUSCULAR; INTRAVENOUS at 09:05

## 2024-05-26 RX ADMIN — IPRATROPIUM BROMIDE AND ALBUTEROL SULFATE 3 ML: .5; 3 SOLUTION RESPIRATORY (INHALATION) at 07:05

## 2024-05-26 RX ADMIN — POTASSIUM CHLORIDE AND SODIUM CHLORIDE: 450; 150 INJECTION, SOLUTION INTRAVENOUS at 03:05

## 2024-05-26 RX ADMIN — UMECLIDINIUM BROMIDE AND VILANTEROL TRIFENATATE 1 PUFF: 62.5; 25 POWDER RESPIRATORY (INHALATION) at 05:05

## 2024-05-26 RX ADMIN — ALPRAZOLAM 0.25 MG: 0.25 TABLET ORAL at 09:05

## 2024-05-26 RX ADMIN — IPRATROPIUM BROMIDE AND ALBUTEROL SULFATE 3 ML: .5; 3 SOLUTION RESPIRATORY (INHALATION) at 08:05

## 2024-05-26 RX ADMIN — SPIRONOLACTONE 25 MG: 25 TABLET ORAL at 09:05

## 2024-05-26 RX ADMIN — SENNOSIDES AND DOCUSATE SODIUM 1 TABLET: 8.6; 5 TABLET ORAL at 09:05

## 2024-05-26 RX ADMIN — MEGESTROL ACETATE 40 MG: 20 TABLET ORAL at 12:05

## 2024-05-26 RX ADMIN — ATORVASTATIN CALCIUM 80 MG: 40 TABLET, FILM COATED ORAL at 09:05

## 2024-05-26 RX ADMIN — CEFTRIAXONE SODIUM 1 G: 1 INJECTION, POWDER, FOR SOLUTION INTRAMUSCULAR; INTRAVENOUS at 12:05

## 2024-05-26 RX ADMIN — DICYCLOMINE HYDROCHLORIDE 20 MG: 20 TABLET ORAL at 09:05

## 2024-05-26 NOTE — PROGRESS NOTES
[facility] Hospital Medicine Progress Note        Chief Complaint: Inpatient Follow-up for     HPI:   Mrs Walden is a 73 year old lady with PMH of HTN, HLD, CAD/Stents, PAD/B/L Stents, GERD, COPD on home O2, Fe Def Anemia, Prior MAC Infection on Chronic Suppressive Therapy who was admitted on 05/24 with complaints of nausea, vomiting for the last 2-3 days particularly on attempting PO intake. She has also been experiencing worsening SOB, fatigue and lethargy. At bedside she endorsed central sharp chest pain which was described to be non-radiating and only present since earlier in the morning on day of admission. Reported poor PO intake for the last few days. Denied any fever, hematemesis, dysphagia, night sweats, headache, numbness, weakness, dizziness, lightheadedness or LOC but did admit to having chronic epigastric abdominal pain, chills, cough productive of white sputum. On admission noted to be afebile, normotensive and saturating well on 3 L O2. Labs showed HGB/HCT of 11.5/35.2, WBCs of 5.72, platelets of 225, potassium of 2.3, calcium of 14.2, HC03 of 34, BUN/creatinine of 26.8/1.15, AST/ALT of 18/11, ALP of 72.  BNP of 143.8, troponin of 0.019 which trended up to 0.502, lactic acid of 1.3.  Respiratory PCR panel done which was unremarkable.  Blood cultures sent off.  CXR showed hyperinflated lungs with right upper zone irregular opacity with no obvious infiltrates/consolidates or effusions.  CTA chest showed no PE with stenosis of distal right main pulmonary artery, right upper and lower pulmonary arteries, right upper lobe soft tissue opacity with calcifications which was seen on prior exams as well, associated right upper lobe atelectasis, left suprahilar soft tissue opacity with calcifications also seen on prior exam, left infrahilar soft tissue opacity with some calcifications seen on prior exam, subpleural area of nodular opacity as well as some scarring in left lower lobe  also seen on previous exam, some emphysematous changes noted B/L.  U/S retroperitoneum done which showed mild bilateral hydronephrosis. Admitted to hospital medicine. Pulmonology consulted.      Pulmonology note reviewed and treatment history of MAC infection noted.  Previous AFB smears have been negative with no growth on 02/09/2024, 03/27/2024 with AFB smear/mycobacterial culture from 04/17 still pending.  Patient remains on chronic suppressive treatment with azithromycin TIW.     General: alert lady lying comfortably in bed, in no acute distress  HENT: NC in place; oral and oropharyngeal mucosa moist, pink, with no erythema or exudates, no ear pain or discharge  Neck: normal neck movement, no lymph nodes or swellings, no JVD or Carotid bruit  Respiratory: clear breathing sounds bilaterally, no crackles, rales, ronchi or wheezes  Cardiovascular: clear S1 and S2, no murmurs, rubs or gallops  Peripheral Vascular: no lesions, ulcers or erosions, normal peripheral pulses and no pedal edema  Gastrointestinal: soft, non-tender, non-distended abdomen, no guarding, rigidity or rebound tenderness, normal bowel sounds  Integumentary: normal skin color, no rashes or lesions  Neuro: AAO x 3; motor strength 5/5 in B/L UEs & LEs; sensation intact to gross and fine touch B/L; CN II-XII grossly intact     Will start on DuoNeb q4hrs & IV Rocephin/Doxycycline. Will continue PO Azithromycin TIW. Pulmonology on-board; will follow recommendations. In light of ongoing chest pain & troponinemia, will start FD Lovenox & consult CIS. Will keep trending Troponins. Will start IV NS for WISAM & worsened hypercalcemia likely d/t intravascular depletion. Will give SC Calcitonin BID x 2 doses given corrected Calcium is > 14 with symptoms of lethargy & dyspnea. Will hold off on IV Zoledronic Acid for now and dose if Calcium levels do not show improvement. Metabolic alkalemia noted likely d/t N/V. Will replace K. Family requested Palliative Care  consult for discussion regarding goals of care; will place consult. Continuing home Amlodipine 10 mg, ASA 81 mg, Atorvastatin 80 mg, Bentyl 20 mg, Diltiazem 120 mg, Pepcid 20 mg, Mirtazapine 45 mg. Continue monitoring symptoms.      Interval Hx:   Patient seen and examined by bedside.  Family by bedside.  Continued to be extremely nauseous.  Says can not tolerate ensure at all or any other type of protein shake.  Family and patient is unsure and likely do not want any invasive workup.  Have requested to speak with hospice.  Patient also states that she is severely constipated and is also having some hemorrhoidal pain, anxiety continues to be present      Case was discussed with patient's nurse and  on the floor.    Objective/physical exam:  General:  Chronically ill-appearing female, cachectic  Chest: Clear to auscultation bilaterally, on her chronic home O2 requirement  Heart: RRR, +S1, S2, no appreciable murmur  Abdomen: Soft, nontender, BS +  MSK: Warm, no lower extremity edema, no clubbing or cyanosis  Neurologic: Alert and oriented x4, Cranial nerve II-XII intact, Strength 5/5 in all 4 extremities    VITAL SIGNS: 24 HRS MIN & MAX LAST   Temp  Min: 97.9 °F (36.6 °C)  Max: 98.6 °F (37 °C) 98.3 °F (36.8 °C)   BP  Min: 131/68  Max: 171/71 (!) 162/73   Pulse  Min: 57  Max: 70  (!) 58   Resp  Min: 16  Max: 19 19   SpO2  Min: 93 %  Max: 99 % 99 %     I have reviewed the following labs:  Recent Labs   Lab 05/24/24  1109 05/25/24  0246 05/26/24  0552   WBC 5.72 6.33 7.25   RBC 4.28 3.49* 3.60*   HGB 11.5* 9.4* 9.7*   HCT 35.2* 28.5* 29.6*   MCV 82.2 81.7 82.2   MCH 26.9* 26.9* 26.9*   MCHC 32.7* 33.0 32.8*   RDW 13.8 14.3 14.2    258 299   MPV 9.9 10.0 10.0     Recent Labs   Lab 05/24/24  1109 05/24/24  1906 05/25/24  0246 05/26/24  0552    146* 145 142   K 2.3* 2.4* 2.4* 2.8*   CO2 34* 35* 33* 30   BUN 26.8* 24.2* 20.3* 11.4   CREATININE 1.15* 1.22* 1.05* 0.77   CALCIUM 14.2* 14.2* 12.7* 10.6*    MG 2.20  --   --   --    ALBUMIN 3.1*  --  2.8* 2.8*   ALKPHOS 72  --  67 62   ALT 11  --  13 16   AST 18  --  22 36*   BILITOT 0.8  --  0.5 0.4     Microbiology Results (last 7 days)       Procedure Component Value Units Date/Time    Blood Culture #1 **CANNOT BE ORDERED STAT** [[id number]]  (Normal) Collected: 05/24/24 1113    Order Status: Completed Specimen: Blood Updated: 05/25/24 1300     Blood Culture No Growth At 24 Hours    Blood Culture #1 **CANNOT BE ORDERED STAT** [[id number]]  (Normal) Collected: 05/24/24 1114    Order Status: Completed Specimen: Blood Updated: 05/25/24 1300     Blood Culture No Growth At 24 Hours             See below for Radiology    Scheduled Med:   albuterol-ipratropium  3 mL Nebulization Q4H WAKE    ALPRAZolam  0.25 mg Oral TID    aspirin  81 mg Oral Daily    atorvastatin  80 mg Oral Daily    azithromycin  250 mg Oral Every Mon, Wed, Fri    cefTRIAXone (Rocephin) IV (PEDS and ADULTS)  1 g Intravenous Q24H    dicyclomine  20 mg Oral Daily    diltiaZEM  120 mg Oral Daily    doxycycline IV (PEDS and ADULTS)  100 mg Intravenous Q12H    enoxparin  1 mg/kg Subcutaneous Q24H    famotidine  20 mg Oral QHS    fluticasone furoate-vilanteroL  1 puff Inhalation Daily    hydrocortisone   Rectal BID    [START ON 5/27/2024] isosorbide mononitrate  30 mg Oral Daily    megestroL  40 mg Oral Daily    mirtazapine  45 mg Oral Nightly    polyethylene glycol  17 g Oral Daily    potassium chloride in water  20 mEq Intravenous Q2H    scopolamine  1 patch Transdermal Q3 Days    senna-docusate 8.6-50 mg  1 tablet Oral Daily    spironolactone  25 mg Oral Daily    vortioxetine  10 mg Oral Daily      Continuous Infusions:   0.45 % NaCl with KCl 20 mEq   Intravenous Continuous 125 mL/hr at 05/26/24 0748 Rate Change at 05/26/24 0748      PRN Meds:    Current Facility-Administered Medications:     acetaminophen, 650 mg, Oral, Q4H PRN    hydrALAZINE, 10 mg, Intravenous, Q4H PRN    melatonin, 6 mg, Oral, Nightly  PRN    ondansetron, 4 mg, Intravenous, Q6H PRN    promethazine, 12.5 mg, Intramuscular, Q4H PRN    simethicone, 1 tablet, Oral, TID PRN     Patient meets ASPEN criteria for  (unable to eval, will attempt upon F/U) malnutrition of   per RD assessment as evidenced by:                       A minimum of two characteristics is recommended for diagnosis of either severe or non-severe malnutrition.    Assessment/Plan:  Severe hypokalemia , improving  Metabolic alkalosis, secondary to nausea and vomiting , resolved  WISAM, likely secondary to dehydration , resolved  History of MAC, currently on chronic suppressive therapy with azithromycin   Chronic right middle lobe atelectasis and linear atelectasis right upper lobe in a left upper lobe   Chronic obstructive pulmonary disease with severe baseline obstruction  Chronic hypoxic respiratory failure  Chronic epigastric abdominal pain , GI extensive workup has been done in the past with no clear cause  Elevated troponin   Hypercalcemia, possibly likely secondary to dehydration, improving  ? 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 Zithromax and plans are for lifetime antibiotic suppressive therapy   Slight hemoglobin drop from 11.5 to 9.7 this a.m., likely secondary to dilutional due to fluid  Continue Rocephin and doxycycline for possible pneumonia, we will continue for 5 days to complete course  Continue IV fluids of half-normal saline with KCl at 125 cc/hour   Forty mEq of IV potassium ordered  Cardiology consulted for elevated troponins, family does not want any invasive workup.   Recommend medical management of NSTEMI and to continue full-dose Lovenox 48 hours and then can decrease back to prophylaxis Lovenox  Imdur added 30 mg daily along with spironolactone 25 mg  Amlodipine has been discontinued as patient is already on Cardizem, avoid beta-blocker due to COPD  Phenergan and Zofran on board for nausea, we will also add scopolamine patch for further control of nausea  Nutrition consult, appreciate recommendations   Calcitonin x2 doses given for hypercalcemia, likely secondary to dehydration   Continue IV fluids, hypercalcemia improving  Recheck labs in a.m.  Patient says pain started after loss of her has been, suspect lot of stress/anxiety related   Hemorrhoidal cream ordered for pain  We will also try suppository for constipation  Pulmonology consulted due to significant pulmonary findings and CT scan and history, patient is also well known to the pulmonology group, appreciate recommendations  Family unsure if they would want to proceed with biopsy of the mass or any invasive workup.  Would like to speak with hospice for options  Appetite stimulant added  Palliative care consult for goals of care discussion   Further recs based on clinical course    Critical care note:  Critical care diagnosis:  Hypercalcemia needing aggressive IV fluid resuscitation  Critical care interventions: Hands-on evaluation, review of labs/radiographs/records and discussion with patient and family if present  Critical care time spent: 35 minutes         VTE prophylaxis:  Lovenox    Patient condition:  Stable/Fair/Guarded/ Serious/ Critical    Anticipated discharge and Disposition:   To be determined      All diagnosis and differential diagnosis have been reviewed; assessment and plan has been documented; I have personally reviewed the labs and test results that are presently available; I have reviewed the patients medication list; I have reviewed the consulting providers response and recommendations. I have  reviewed or attempted to review medical records based upon their availability    All of the patient's questions have been  addressed and answered. Patient's is agreeable to the above stated plan. I will continue to monitor closely and make adjustments to medical management as needed.  _____________________________________________________________________    Nutrition Status:    Radiology:  I have personally reviewed the following imaging and agree with the radiologist.     Echo    Left Ventricle: The left ventricle is normal in size. Normal wall   thickness. There is normal systolic function with a visually estimated   ejection fraction of 60 - 65%. Grade I diastolic dysfunction.    Right Ventricle: Normal right ventricular cavity size. Systolic   function is normal.    Aortic Valve: There is mild aortic valve sclerosis. There is mild   aortic regurgitation.    Mitral Valve: There is no stenosis. The mean pressure gradient across   the mitral valve is 3 mmHg at a heart rate of  bpm. There is mild   regurgitation.    Tricuspid Valve: There is mild regurgitation.    IVC/SVC: Normal venous pressure at 3 mmHg.      Cate Hairston DO  Department of Hospital Medicine  [facility]  05/26/2024

## 2024-05-26 NOTE — PROGRESS NOTES
[facility] - Observation Unit    Cardiology  Progress Note    Patient Name: Alesia Walden  MRN: [id number]  Admission Date: 5/24/2024  Hospital Length of Stay: 2 days  Code Status: No Order   Attending Provider: Cate Hairston DO   Consulting Provider: JEANA Lincoln  Primary Care Physician: Chuck Estrella MD  Principal Problem:<principal problem not specified>    Patient information was obtained from patient, past medical records, and ER records.     Subjective:     Chief Complaint/Reason for Consult: NSTEMI    HPI:   Ms. Walden is a 74 y/o female, followed by Dr. Amaya, who presented to ED with c/o SOB, generalized weakness, N/V and chronic epigastric pain. CTA of chest negative for PE but did reveal severe narrowing of right distal main Pulmonary artery, near complete occlusion of SHAYLA superior segment pulmonary artery s/t left suprahilar mass. Lab significant for K+ 2.4, BUN/creatinine 24.2/1.22, troponin 0.019-0.502-0.443. She was started on ABX/nebs, Lovenox. K+ supplemented and admitted to hospital medicine. CIS  consulted due to NSTEMI    Hospital Course:  05.26.24: Patient c/o nausea/constipation as well as intermittent episodes of left sided chest tightness. Discussed abnormal Lexiscan in the past and whether to proceed with angiogram. Patient and family are discussing going home with hospice and wants medical therapy only      PMH: Native CAD/PCI dLAD, HTN, VHD- AR, COPD/Home O2, HLD, GERD, fungal infection of lung, FAVIOLA, PADMAJA, Prior MAC infection/ chronic suppressive therapy  PSH: LHC, L GSV ablation, hysterectomy, colon surgery, bladder surgery, left ankle joint replacement, colonoscopy  Family History: Mother- MI;   Social History: never smoker    Previous Cardiac Diagnostics:   TTE 05.24.24:  Left Ventricle: The left ventricle is normal in size. Normal wall thickness. There is normal systolic function with a visually estimated ejection fraction of 60 - 65%. Grade I diastolic  dysfunction.    Right Ventricle: Normal right ventricular cavity size. Systolic function is normal.    Aortic Valve: There is mild aortic valve sclerosis. There is mild aortic regurgitation.    Mitral Valve: There is no stenosis. The mean pressure gradient across the mitral valve is 3 mmHg at a heart rate of  bpm. There is mild regurgitation.    Tricuspid Valve: There is mild regurgitation.    IVC/SVC: Normal venous pressure at 3 mmHg.     This scan is suggestive of moderate risk for future cardiovascular events.    Medium reversible perfusion abnormality of moderate intensity in the anterior region.    Perfusion imaging is suggestive of single vessel disease. Perfusion defect is in the distribution of left anterior descending artery.    The left ventricular cavity is noted to be normal on the stress studies. The stress left ventricular ejection fraction was calculated to be 74% and left ventricular global function is normal. The rest left ventricular cavity is noted to be normal. The rest left ventricular ejection fraction was calculated to be 77% and rest left ventricular global function is normal.    When compared to the resting ejection fraction (77%), the stress ejection fraction (74%) has decreased.    Transient ischemic dilatation is present and has been described as a marker for high risk coronary artery disease. It has also been described in microvascular disease, hypertensive heart disease as well as cardiac deconditioning.    CUS 06.06.23:  1. The study quality is average.   2. 1-39% stenosis in the proximal right internal carotid artery based on Bluth Criteria. Antegrade right vertebral artery flow.   3. 1-39% stenosis in the proximal left internal carotid artery based on Bluth Criteria. Antegrade left vertebral artery flow.   4. 1-39% stenosis in the proximal left internal carotid artery based on Bluth Criteria.   5. 1-39% stenosis in the proximal right internal carotid artery based on Bluth Criteria.    6. Antegrade left vertebral artery flow.   7. Antegrade right vertebral artery flow.     TTE 06.06.23:  1. The study quality is average.   2. The left ventricle is normal in size. Global left ventricular systolic function is normal. The left ventricular ejection fraction is 60%. Left ventricular diastolic function is normal.   3. Mild (1+) mitral regurgitation.   4. Mild to moderate (1-2+) aortic regurgitation. Mild calcification of the aortic valve is noted with adequate cuspal excursion.   5. The pulmonary artery systolic pressure is 43 mmHg.     [facility] 07.18.18:  LM: normal  LAD: patent dLAD stent; mid LAD 35% stenosis  Lcx: normal  RCA: Normal  Ramus: Normal    Past Medical History:   Diagnosis Date    Abdominal pain, LLQ     Abdominal pain, RLQ     Acute bilateral low back pain with left-sided sciatica     Anxiety     Cardiac microvascular disease     Carotid bruit     Constipation, unspecified     COPD (chronic obstructive pulmonary disease)     Depressive disorder     Diverticulitis     Dyslipidemia     GERD (gastroesophageal reflux disease), chronic     Heart valve disorder     Iron deficiency anemia     Moderate aortic regurgitation     Primary hypertension     PTCA with stent insertion     Shortness of breath     Status post laser ablation of incompetent vein     Thyroid enlarged     Varicose veins of bilateral lower extremities with other complications     Venous insufficiency      Past Surgical History:   Procedure Laterality Date    COLONOSCOPY N/A 2/6/2023    Procedure: COLON;  Surgeon: Dorothea Davey III, MD;  Location: [facility] ENDOSCOPY;  Service: Gastroenterology;  Laterality: N/A;    COLONOSCOPY, WITH POLYPECTOMY USING SNARE  2/6/2023    Procedure: COLONOSCOPY, WITH POLYPECTOMY USING SNARE;  Surgeon: Dorothea Davey III, MD;  Location: [facility] ENDOSCOPY;  Service: Gastroenterology;;    EGD, WITH CLOSED BIOPSY N/A 3/7/2023    Procedure: EGD;  Surgeon: Dorothea Davey III, MD;  Location: [facility]  ENDOSCOPY;  Service: Gastroenterology;  Laterality: N/A;  Pre OP packet for procedure was mailed to patient     Review of patient's allergies indicates:   Allergen Reactions    No known drug allergies      No current facility-administered medications on file prior to encounter.     Current Outpatient Medications on File Prior to Encounter   Medication Sig    albuterol (PROVENTIL/VENTOLIN HFA) 90 mcg/actuation inhaler Inhale 1-2 puffs into the lungs every 6 (six) hours as needed for Wheezing. Rescue    albuterol-ipratropium (DUO-NEB) 2.5 mg-0.5 mg/3 mL nebulizer solution USE 1 AMPULE IN NEBULIZER ONCE DAILY FOLLOWING AMIKACIN NEBULIZATION    ALPRAZolam (XANAX) 0.5 MG tablet Take 1/2 tablet in am, 1/2 tablet at lunch and 1 tablet in pm.    amLODIPine (NORVASC) 10 MG tablet Take 10 mg by mouth.    aspirin (ECOTRIN) 81 MG EC tablet tablet    cyclobenzaprine (FLEXERIL) 10 MG tablet Take 1 tablet (10 mg total) by mouth every evening.    dicyclomine (BENTYL) 20 mg tablet Take 1 tablet by mouth once daily.    diphenoxylate-atropine 2.5-0.025 mg (LOMOTIL) 2.5-0.025 mg per tablet Take 1 tablet by mouth 3 (three) times daily as needed.    ergocalciferol (ERGOCALCIFEROL) 50,000 unit Cap Take 1 capsule by mouth once a week    omeprazole (PRILOSEC) 40 MG capsule Take 40 mg by mouth before breakfast.    ondansetron (ZOFRAN-ODT) 8 MG TbDL DISSOLVE 1 TABLET IN MOUTH EVERY 6 HOURS AS NEEDED FOR NAUSEA    sucralfate (CARAFATE) 1 gram tablet 90 tablets.    temazepam (RESTORIL) 30 mg capsule TAKE 1 CAPSULE BY MOUTH NIGHTLY AS NEEDED FOR INSOMNIA    alendronate (FOSAMAX) 70 MG tablet TAKE 1 TABLET BY MOUTH ONCE A WEEK ON  EVERY  TUESDAY    atorvastatin (LIPITOR) 80 MG tablet Take 80 mg by mouth.    azithromycin (Z-AMADNO) 250 MG tablet Take 1 tablet (250 mg total) by mouth every Mon, Wed, Fri.    Bifidobacterium infantis (ALIGN) 4 mg Cap capsule    diltiaZEM (CARDIZEM CD) 120 MG Cp24 Take 120 mg by mouth.    DULoxetine (CYMBALTA) 60 MG  capsule Take 1 capsule by mouth once daily (Patient not taking: Reported on 5/20/2024)    estradiol 0.025 mg/24 hr 0.025 mg/24 hr APPLY 1 PATCH TOPICALLY ONCE A WEEK EVERY  FRIDAY    famotidine (PEPCID) 40 MG tablet Take 40 mg by mouth every evening.    hyoscyamine (LEVSIN/SL) 0.125 mg Subl 60 tablets. (Patient not taking: Reported on 5/20/2024)    iron bis-gly/FA/C/B12/Ca/succ (IRON-150 ORAL) Take by mouth.    L.acidophilus-B.bifidum,longum 150 mg (3 billion cell) Chew     mirtazapine (REMERON) 45 MG tablet Take 45 mg by mouth.    mupirocin (BACTROBAN) 2 % ointment APPLY OINTMENT TOPICALLY TWICE DAILY TO SORES    pantoprazole (PROTONIX) 40 MG tablet TAKE 1 TABLET BY MOUTH TWICE DAILY TAKE AT LEAST 30 MINUTES BEFORE MEAL BREAKFAST AND EVENINGS MEALS    REXULTI 2 mg Tab Take 1 tablet by mouth once daily    roflumilast (DALIRESP) 500 mcg Tab Take 1 tablet (500 mcg total) by mouth once daily.    sodium chloride 3% 3 % nebulizer solution USE 4 ML IN NEBULIZER ONCE DAILY    sodium chloride 3% 3 % nebulizer solution Inhale 4 mLs into the lungs 2 (two) times daily as needed.    umeclidinium-vilanteroL (ANORO ELLIPTA) 62.5-25 mcg/actuation DsDv Inhale 1 puff into the lungs once daily. Controller    valsartan (DIOVAN) 160 MG tablet Take 160 mg by mouth once daily.    vortioxetine (TRINTELLIX) 10 mg Tab Take 1 tablet by mouth daily.     Family History       Problem Relation (Age of Onset)    COPD Father    Heart attack Mother    No Known Problems Brother          Tobacco Use    Smoking status: Never     Passive exposure: Never    Smokeless tobacco: Never   Substance and Sexual Activity    Alcohol use: Never    Drug use: Never    Sexual activity: Not Currently       Review of Systems   Constitutional: Negative.    Respiratory:  Negative for shortness of breath.    Cardiovascular:  Positive for chest pain.   Gastrointestinal:  Positive for constipation and nausea.   Musculoskeletal: Negative.    Neurological: Negative.     Psychiatric/Behavioral: Negative.       Objective:     Vital Signs (Most Recent):  Temp: 98.3 °F (36.8 °C) (05/26/24 0752)  Pulse: (!) 58 (05/26/24 0757)  Resp: 19 (05/26/24 0757)  BP: (!) 162/73 (05/26/24 0752)  SpO2: 99 % (05/26/24 0752) Vital Signs (24h Range):  Temp:  [97.9 °F (36.6 °C)-98.6 °F (37 °C)] 98.3 °F (36.8 °C)  Pulse:  [57-70] 58  Resp:  [16-19] 19  SpO2:  [93 %-99 %] 99 %  BP: (131-171)/(57-73) 162/73   Weight: 38.6 kg (85 lb)  Body mass index is 15.06 kg/m².  SpO2: 99 %       Intake/Output Summary (Last 24 hours) at 5/26/2024 0919  Last data filed at 5/26/2024 0048  Gross per 24 hour   Intake 1081 ml   Output --   Net 1081 ml     Lines/Drains/Airways       Peripheral Intravenous Line  Duration                  Peripheral IV - Single Lumen 05/25/24 0430 20 G Anterior;Right Forearm 1 day                  Significant Labs:  Recent Results (from the past 72 hour(s))   EKG 12-lead    Collection Time: 05/24/24 10:50 AM   Result Value Ref Range    QRS Duration 150 ms    [facility] QTC Calculation 392 ms   Comprehensive metabolic panel    Collection Time: 05/24/24 11:09 AM   Result Value Ref Range    Sodium 145 136 - 145 mmol/L    Potassium 2.3 (LL) 3.5 - 5.1 mmol/L    Chloride 101 98 - 107 mmol/L    CO2 34 (H) 23 - 31 mmol/L    Glucose 104 82 - 115 mg/dL    Blood Urea Nitrogen 26.8 (H) 9.8 - 20.1 mg/dL    Creatinine 1.15 (H) 0.55 - 1.02 mg/dL    Calcium 14.2 (HH) 8.4 - 10.2 mg/dL    Protein Total 6.5 5.8 - 7.6 gm/dL    Albumin 3.1 (L) 3.4 - 4.8 g/dL    Globulin 3.4 2.4 - 3.5 gm/dL    Albumin/Globulin Ratio 0.9 (L) 1.1 - 2.0 ratio    Bilirubin Total 0.8 <=1.5 mg/dL    ALP 72 40 - 150 unit/L    ALT 11 0 - 55 unit/L    AST 18 5 - 34 unit/L    eGFR 51 mL/min/1.73/m2    Anion Gap 10.0 mEq/L    BUN/Creatinine Ratio 23    Troponin I    Collection Time: 05/24/24 11:09 AM   Result Value Ref Range    Troponin-I 0.019 0.000 - 0.045 ng/mL   Brain natriuretic peptide    Collection Time: 05/24/24 11:09 AM   Result Value Ref  Range    Natriuretic Peptide 143.8 (H) <=100.0 pg/mL   Magnesium    Collection Time: 05/24/24 11:09 AM   Result Value Ref Range    Magnesium Level 2.20 1.60 - 2.60 mg/dL   Protime-INR    Collection Time: 05/24/24 11:09 AM   Result Value Ref Range    PT 14.0 12.5 - 14.5 seconds    INR 1.1 <=1.3   COVID/RSV/FLU A&B PCR    Collection Time: 05/24/24 11:09 AM   Result Value Ref Range    Influenza A PCR Not Detected Not Detected    Influenza B PCR Not Detected Not Detected    Respiratory Syncytial Virus PCR Not Detected Not Detected    SARS-CoV-2 PCR Not Detected Not Detected, Negative   Respiratory Panel    Collection Time: 05/24/24 11:09 AM   Result Value Ref Range    Adenovirus Not Detected Not Detected    Coronavirus 229E Not Detected Not Detected    Coronavirus HKU1 Not Detected Not Detected    Coronavirus NL63 Not Detected Not Detected    Coronavirus OC43 PCR, Common Cold Virus Not Detected Not Detected    Human Metapneumovirus Not Detected Not Detected    Parainfluenza Virus 1 Not Detected Not Detected    Parainfluenza Virus 2 Not Detected Not Detected    Parainfluenza Virus 3 Not Detected Not Detected    Parainfluenza Virus 4 Not Detected Not Detected    Bordetella pertussis (ptxP) Not Detected Not Detected    Chlamydia pneumoniae Not Detected Not Detected    Mycoplasma pneumoniae Not Detected Not Detected    Human Rhinovirus/Enterovirus Not Detected Not Detected    Bordetella parapertussis (FR7838) Not Detected Not Detected   CBC with Differential    Collection Time: 05/24/24 11:09 AM   Result Value Ref Range    WBC 5.72 4.50 - 11.50 x10(3)/mcL    RBC 4.28 4.20 - 5.40 x10(6)/mcL    Hgb 11.5 (L) 12.0 - 16.0 g/dL    Hct 35.2 (L) 37.0 - 47.0 %    MCV 82.2 80.0 - 94.0 fL    MCH 26.9 (L) 27.0 - 31.0 pg    MCHC 32.7 (L) 33.0 - 36.0 g/dL    RDW 13.8 11.5 - 17.0 %    Platelet 225 130 - 400 x10(3)/mcL    MPV 9.9 7.4 - 10.4 fL    Neut % 73.9 %    Lymph % 13.1 %    Mono % 11.0 %    Eos % 1.2 %    Basophil % 0.3 %    Lymph #  0.75 0.6 - 4.6 x10(3)/mcL    Neut # 4.22 2.1 - 9.2 x10(3)/mcL    Mono # 0.63 0.1 - 1.3 x10(3)/mcL    Eos # 0.07 0 - 0.9 x10(3)/mcL    Baso # 0.02 <=0.2 x10(3)/mcL    IG# 0.03 0 - 0.04 x10(3)/mcL    IG% 0.5 %    NRBC% 0.0 %   Vitamin D    Collection Time: 05/24/24 11:09 AM   Result Value Ref Range    Vitamin D 84 (H) 30 - 80 ng/mL   PTH, Intact    Collection Time: 05/24/24 11:09 AM   Result Value Ref Range    PARATHYROID HORMONE INTACT 31.3 8.7 - 77.0 pg/mL   Phosphorus    Collection Time: 05/24/24 11:09 AM   Result Value Ref Range    Phosphorus Level 3.2 2.3 - 4.7 mg/dL   Blood Culture #1 **CANNOT BE ORDERED STAT**    Collection Time: 05/24/24 11:13 AM    Specimen: Blood   Result Value Ref Range    Blood Culture No Growth At 24 Hours    Lactic acid, plasma    Collection Time: 05/24/24 11:13 AM   Result Value Ref Range    Lactic Acid Level 1.3 0.5 - 2.2 mmol/L   Blood Culture #1 **CANNOT BE ORDERED STAT**    Collection Time: 05/24/24 11:14 AM    Specimen: Blood   Result Value Ref Range    Blood Culture No Growth At 24 Hours    EKG 12-lead    Collection Time: 05/24/24  1:13 PM   Result Value Ref Range    QRS Duration 186 ms    [facility]HS QTC Calculation 743 ms   Echo    Collection Time: 05/24/24  5:20 PM   Result Value Ref Range    BSA 1.31 m2    Knight's Biplane MOD Ejection Fraction 63 %    LVOT stroke volume 96.71 cm3    LVIDd 4.41 3.5 - 6.0 cm    LV Systolic Volume 28.50 mL    LV Systolic Volume Index 21.3 mL/m2    LVIDs 2.76 2.1 - 4.0 cm    LV Diastolic Volume 88.20 mL    LV Diastolic Volume Index 65.82 mL/m2    IVS 0.93 0.6 - 1.1 cm    LVOT diameter 2.00 cm    LVOT area 3.1 cm2    FS 37 28 - 44 %    Left Ventricle Relative Wall Thickness 0.46 cm    Posterior Wall 1.01 0.6 - 1.1 cm    LV mass 142.27 g    LV Mass Index 106 g/m2    MV Peak E Harmeet 0.57 m/s    TDI LATERAL 0.09 m/s    TDI SEPTAL 0.04 m/s    E/E' ratio 8.77 m/s    MV Peak A Harmeet 1.28 m/s    TR Max Harmeet 2.94 m/s    E/A ratio 0.45     E wave deceleration time  198.00 msec    LV SEPTAL E/E' RATIO 14.25 m/s    LV LATERAL E/E' RATIO 6.33 m/s    LVOT peak harmeet 1.38 m/s    Left Ventricular Outflow Tract Mean Velocity 0.93 cm/s    Left Ventricular Outflow Tract Mean Gradient 4.00 mmHg    TAPSE 2.21 cm    LA size 3.50 cm    LA volume (mod) 16.10 cm3    LA Volume Index (Mod) 12.0 mL/m2    AV regurgitation pressure 1/2 time 487 ms    AR Max Harmeet 4.33 m/s    AV mean gradient 7 mmHg    AV peak gradient 12 mmHg    Ao peak harmeet 1.70 m/s    Ao VTI 38.70 cm    LVOT peak VTI 30.80 cm    AV valve area 2.50 cm²    AV Velocity Ratio 0.81     AV index (prosthetic) 0.80     ERIK by Velocity Ratio 2.55 cm²    MV mean gradient 3 mmHg    MV peak gradient 6 mmHg    MV stenosis pressure 1/2 time 56.00 ms    MV valve area p 1/2 method 3.93 cm2    MV valve area by continuity eq 2.51 cm2    MV VTI 38.6 cm    Triscuspid Valve Regurgitation Peak Gradient 35 mmHg    PV PEAK VELOCITY 0.91 m/s    PV peak gradient 3 mmHg    Mean e' 0.07 m/s    ZLVIDS 0.25     ZLVIDD 0.25     TV resting pulmonary artery pressure 38 mmHg    RV TB RVSP 6 mmHg    Est. RA pres 3 mmHg   Basic Metabolic Panel    Collection Time: 05/24/24  7:06 PM   Result Value Ref Range    Sodium 146 (H) 136 - 145 mmol/L    Potassium 2.4 (LL) 3.5 - 5.1 mmol/L    Chloride 102 98 - 107 mmol/L    CO2 35 (H) 23 - 31 mmol/L    Glucose 122 (H) 82 - 115 mg/dL    Blood Urea Nitrogen 24.2 (H) 9.8 - 20.1 mg/dL    Creatinine 1.22 (H) 0.55 - 1.02 mg/dL    BUN/Creatinine Ratio 20     Calcium 14.2 (HH) 8.4 - 10.2 mg/dL    Anion Gap 9.0 mEq/L    eGFR 47 mL/min/1.73/m2   Troponin I    Collection Time: 05/24/24  7:06 PM   Result Value Ref Range    Troponin-I 0.502 (H) 0.000 - 0.045 ng/mL   CK    Collection Time: 05/24/24  7:06 PM   Result Value Ref Range    Creatine Kinase 95 29 - 168 U/L   CK-MB    Collection Time: 05/24/24  7:06 PM   Result Value Ref Range    Creatine Kinase MB 2.5 <=3.4 ng/mL   Comprehensive Metabolic Panel    Collection Time: 05/25/24  2:46 AM    Result Value Ref Range    Sodium 145 136 - 145 mmol/L    Potassium 2.4 (LL) 3.5 - 5.1 mmol/L    Chloride 104 98 - 107 mmol/L    CO2 33 (H) 23 - 31 mmol/L    Glucose 126 (H) 82 - 115 mg/dL    Blood Urea Nitrogen 20.3 (H) 9.8 - 20.1 mg/dL    Creatinine 1.05 (H) 0.55 - 1.02 mg/dL    Calcium 12.7 (HH) 8.4 - 10.2 mg/dL    Protein Total 5.9 5.8 - 7.6 gm/dL    Albumin 2.8 (L) 3.4 - 4.8 g/dL    Globulin 3.1 2.4 - 3.5 gm/dL    Albumin/Globulin Ratio 0.9 (L) 1.1 - 2.0 ratio    Bilirubin Total 0.5 <=1.5 mg/dL    ALP 67 40 - 150 unit/L    ALT 13 0 - 55 unit/L    AST 22 5 - 34 unit/L    eGFR 57 mL/min/1.73/m2    Anion Gap 8.0 mEq/L    BUN/Creatinine Ratio 19    Troponin I    Collection Time: 05/25/24  2:46 AM   Result Value Ref Range    Troponin-I 0.443 (H) 0.000 - 0.045 ng/mL   CBC with Differential    Collection Time: 05/25/24  2:46 AM   Result Value Ref Range    WBC 6.33 4.50 - 11.50 x10(3)/mcL    RBC 3.49 (L) 4.20 - 5.40 x10(6)/mcL    Hgb 9.4 (L) 12.0 - 16.0 g/dL    Hct 28.5 (L) 37.0 - 47.0 %    MCV 81.7 80.0 - 94.0 fL    MCH 26.9 (L) 27.0 - 31.0 pg    MCHC 33.0 33.0 - 36.0 g/dL    RDW 14.3 11.5 - 17.0 %    Platelet 258 130 - 400 x10(3)/mcL    MPV 10.0 7.4 - 10.4 fL    Neut % 76.3 %    Lymph % 10.6 %    Mono % 11.1 %    Eos % 1.1 %    Basophil % 0.6 %    Lymph # 0.67 0.6 - 4.6 x10(3)/mcL    Neut # 4.83 2.1 - 9.2 x10(3)/mcL    Mono # 0.70 0.1 - 1.3 x10(3)/mcL    Eos # 0.07 0 - 0.9 x10(3)/mcL    Baso # 0.04 <=0.2 x10(3)/mcL    IG# 0.02 0 - 0.04 x10(3)/mcL    IG% 0.3 %    NRBC% 0.0 %   Urinalysis, Reflex to Urine Culture    Collection Time: 05/25/24  9:01 AM    Specimen: Urine   Result Value Ref Range    Color, UA Colorless Yellow, Light-Yellow, Colorless, Straw, Dark-Yellow    Appearance, UA Clear Clear    Specific Gravity, UA 1.011 1.005 - 1.030    pH, UA 6.5 5.0 - 8.5    Protein, UA Negative Negative    Glucose, UA Normal Negative, Normal    Ketones, UA Negative Negative    Blood, UA Negative Negative    Bilirubin, UA  Negative Negative    Urobilinogen, UA Normal 0.2, 1.0, Normal    Nitrites, UA Negative Negative    Leukocyte Esterase, UA Negative Negative    WBC, UA 6-10 (A) None Seen, 0-2, 3-5, 0-5 /HPF    Bacteria, UA None Seen None Seen, Trace /HPF    Squamous Epithelial Cells, UA Trace None Seen /HPF    RBC, UA 0-5 None Seen, 0-2, 3-5, 0-5 /HPF   Troponin I    Collection Time: 05/25/24 10:26 AM   Result Value Ref Range    Troponin-I 0.292 (H) 0.000 - 0.045 ng/mL   Troponin I    Collection Time: 05/25/24  6:36 PM   Result Value Ref Range    Troponin-I 0.231 (H) 0.000 - 0.045 ng/mL   CBC Without Differential    Collection Time: 05/26/24  5:52 AM   Result Value Ref Range    WBC 7.25 4.50 - 11.50 x10(3)/mcL    RBC 3.60 (L) 4.20 - 5.40 x10(6)/mcL    Hgb 9.7 (L) 12.0 - 16.0 g/dL    Hct 29.6 (L) 37.0 - 47.0 %    MCV 82.2 80.0 - 94.0 fL    MCH 26.9 (L) 27.0 - 31.0 pg    MCHC 32.8 (L) 33.0 - 36.0 g/dL    RDW 14.2 11.5 - 17.0 %    Platelet 299 130 - 400 x10(3)/mcL    MPV 10.0 7.4 - 10.4 fL    NRBC% 0.0 %   Comprehensive Metabolic Panel    Collection Time: 05/26/24  5:52 AM   Result Value Ref Range    Sodium 142 136 - 145 mmol/L    Potassium 2.8 (L) 3.5 - 5.1 mmol/L    Chloride 104 98 - 107 mmol/L    CO2 30 23 - 31 mmol/L    Glucose 88 82 - 115 mg/dL    Blood Urea Nitrogen 11.4 9.8 - 20.1 mg/dL    Creatinine 0.77 0.55 - 1.02 mg/dL    Calcium 10.6 (H) 8.4 - 10.2 mg/dL    Protein Total 5.8 5.8 - 7.6 gm/dL    Albumin 2.8 (L) 3.4 - 4.8 g/dL    Globulin 3.0 2.4 - 3.5 gm/dL    Albumin/Globulin Ratio 0.9 (L) 1.1 - 2.0 ratio    Bilirubin Total 0.4 <=1.5 mg/dL    ALP 62 40 - 150 unit/L    ALT 16 0 - 55 unit/L    AST 36 (H) 5 - 34 unit/L    eGFR >60 mL/min/1.73/m2    Anion Gap 8.0 mEq/L    BUN/Creatinine Ratio 15      Significant Imaging:  Imaging Results              US Retroperitoneal Complete (Final result)  Result time 05/24/24 15:59:38      Final result by Tomasa Taylor MD (05/24/24 15:59:38)                   Impression:      Mild  bilateral hydronephrosis.      Electronically signed by: Tomasa Taylor  Date:    05/24/2024  Time:    15:59               Narrative:    EXAMINATION:  US RETROPERITONEAL COMPLETE    CLINICAL HISTORY:  Bilateral hydronephrosis on CT not well visualized;    TECHNIQUE:  Ultrasound evaluation of the kidneys, region of the ureters, and urinary bladder    COMPARISON:  Ultrasound dated 04/15/2024, CT chest dated 05/24/2024    FINDINGS:  The right kidney measures 10.4 cm with a 1.6 cm angiomyolipoma.  The left kidney measures 8.3 cm. There is mild bilateral hydronephrosis.    The urinary bladder is unremarkable.  Both urinary jets are visualized.    No significant findings within the visualized segments of the abdominal aorta and IVC.                                       CTA Chest Non-Coronary (PE Studies) (Final result)  Result time 05/24/24 13:00:44      Final result by Ariana Rai MD (05/24/24 13:00:44)                   Impression:      No pulmonary embolism seen but there is severe narrowing of the right distal main pulmonary artery as well as proximal right upper and lower lobe pulmonary arteries.  There is also near complete occlusion of the left upper lobe superior segment pulmonary artery secondary to the left suprahilar mass    Bilateral lung masses as outlined above with associated areas of scattered nodularity as outlined above overall stable since the prior examination    Bilateral hydronephrosis not well evaluated on this examination      Electronically signed by: Jonathan Rai  Date:    05/24/2024  Time:    13:00               Narrative:    EXAMINATION:  CTA CHEST NON CORONARY (PE STUDIES)    CLINICAL HISTORY:  Pulmonary embolism (PE) suspected, high prob;    TECHNIQUE:  Low dose axial images, sagittal and coronal reformations were obtained from the thoracic inlet to the lung bases following the IV administration of contrast..  Contrast timing was optimized to evaluate the pulmonary arteries.   MIP images were performed.  Automated exposure control was utilized    COMPARISON:  03/07/2024    FINDINGS:  There is a large mass seen in the right suprahilar region with associated areas of calcification.  There is also some atelectasis in the right upper lobe.  This was seen on the prior examination as well.  There is a left suprahilar spiculated mass seen as well.  It appears the slightly more prominent than the prior examination.  There is some associated calcifications.  Scattered areas nodularity is seen in the upper lobes which were seen on the prior examination as well.  There is an area of nodular scarring and nodularity in the left lower lobe medially and inferiorly.  Similar changes were seen previously.  There are changes consistent with COPD and emphysema in the lungs bilaterally.  There is a left infrahilar mass seen which is also partially calcified but unchanged.    No pulmonary embolism is seen but there is significant narrowing of the right main distal pulmonary artery as well as the right upper lobe and right lower lobe central branches.  There is essentially the complete occlusion of the left upper lobe superior segment branches from the mass in the left suprahilar region.    The heart is normal in size.  Thoracic aorta is normal in caliber.  Some atherosclerotic plaque is seen.  No dissection or aneurysm is seen.    Upper abdomen shows evidence of bilateral hydronephrosis which are not well evaluated on this examination.  There is a fat containing lipomatous lesion in the upper pole the right kidney.                                       X-Ray Chest AP Portable (Final result)  Result time 05/24/24 11:57:20      Final result by Prabhakar Blevins MD (05/24/24 11:57:20)                   Impression:      No significant interval change.      Electronically signed by: Prabhakar Blevins  Date:    05/24/2024  Time:    11:57               Narrative:    EXAMINATION:  XR CHEST AP PORTABLE    CLINICAL  HISTORY:  Shortness of breath    TECHNIQUE:  One view    COMPARISON:  May 2, 2024.    FINDINGS:  Bilateral hilar and perihilar irregular defined masslike appearance is similar.  No superimposed acute congestion, consolidation or pleural effusions.  No pneumothorax.  Cardiopericardial silhouette is within normal limits.                                      Telemetry:  SB    Physical Exam  Cardiovascular:      Rate and Rhythm: Normal rate and regular rhythm.      Pulses: Normal pulses.      Heart sounds: Normal heart sounds.   Pulmonary:      Breath sounds: Normal breath sounds.   Abdominal:      Palpations: Abdomen is soft.   Musculoskeletal:         General: Normal range of motion.   Skin:     General: Skin is warm.      Capillary Refill: Capillary refill takes less than 2 seconds.   Neurological:      General: No focal deficit present.      Mental Status: She is alert.   Psychiatric:         Mood and Affect: Mood normal.       Home Medications:   No current facility-administered medications on file prior to encounter.     Current Outpatient Medications on File Prior to Encounter   Medication Sig Dispense Refill    albuterol (PROVENTIL/VENTOLIN HFA) 90 mcg/actuation inhaler Inhale 1-2 puffs into the lungs every 6 (six) hours as needed for Wheezing. Rescue 27 g 11    albuterol-ipratropium (DUO-NEB) 2.5 mg-0.5 mg/3 mL nebulizer solution USE 1 AMPULE IN NEBULIZER ONCE DAILY FOLLOWING AMIKACIN NEBULIZATION 75 mL 11    ALPRAZolam (XANAX) 0.5 MG tablet Take 1/2 tablet in am, 1/2 tablet at lunch and 1 tablet in pm. 180 tablet 1    amLODIPine (NORVASC) 10 MG tablet Take 10 mg by mouth.      aspirin (ECOTRIN) 81 MG EC tablet tablet      cyclobenzaprine (FLEXERIL) 10 MG tablet Take 1 tablet (10 mg total) by mouth every evening. 30 tablet 5    dicyclomine (BENTYL) 20 mg tablet Take 1 tablet by mouth once daily.      diphenoxylate-atropine 2.5-0.025 mg (LOMOTIL) 2.5-0.025 mg per tablet Take 1 tablet by mouth 3 (three) times  daily as needed.      ergocalciferol (ERGOCALCIFEROL) 50,000 unit Cap Take 1 capsule by mouth once a week 12 capsule 0    omeprazole (PRILOSEC) 40 MG capsule Take 40 mg by mouth before breakfast.      ondansetron (ZOFRAN-ODT) 8 MG TbDL DISSOLVE 1 TABLET IN MOUTH EVERY 6 HOURS AS NEEDED FOR NAUSEA      sucralfate (CARAFATE) 1 gram tablet 90 tablets.      temazepam (RESTORIL) 30 mg capsule TAKE 1 CAPSULE BY MOUTH NIGHTLY AS NEEDED FOR INSOMNIA 30 capsule 5    alendronate (FOSAMAX) 70 MG tablet TAKE 1 TABLET BY MOUTH ONCE A WEEK ON  EVERY  TUESDAY 12 tablet 1    atorvastatin (LIPITOR) 80 MG tablet Take 80 mg by mouth.      azithromycin (Z-AMANDO) 250 MG tablet Take 1 tablet (250 mg total) by mouth every Mon, Wed, Fri. 36 tablet 11    Bifidobacterium infantis (ALIGN) 4 mg Cap capsule      diltiaZEM (CARDIZEM CD) 120 MG Cp24 Take 120 mg by mouth.      DULoxetine (CYMBALTA) 60 MG capsule Take 1 capsule by mouth once daily (Patient not taking: Reported on 5/20/2024) 90 capsule 0    estradiol 0.025 mg/24 hr 0.025 mg/24 hr APPLY 1 PATCH TOPICALLY ONCE A WEEK EVERY  FRIDAY 12 patch 0    famotidine (PEPCID) 40 MG tablet Take 40 mg by mouth every evening.      hyoscyamine (LEVSIN/SL) 0.125 mg Subl 60 tablets. (Patient not taking: Reported on 5/20/2024)      iron bis-gly/FA/C/B12/Ca/succ (IRON-150 ORAL) Take by mouth.      L.acidophilus-B.bifidum,longum 150 mg (3 billion cell) Chew       mirtazapine (REMERON) 45 MG tablet Take 45 mg by mouth.      mupirocin (BACTROBAN) 2 % ointment APPLY OINTMENT TOPICALLY TWICE DAILY TO SORES      pantoprazole (PROTONIX) 40 MG tablet TAKE 1 TABLET BY MOUTH TWICE DAILY TAKE AT LEAST 30 MINUTES BEFORE MEAL BREAKFAST AND EVENINGS MEALS      REXULTI 2 mg Tab Take 1 tablet by mouth once daily 30 tablet 0    roflumilast (DALIRESP) 500 mcg Tab Take 1 tablet (500 mcg total) by mouth once daily. 30 tablet 11    sodium chloride 3% 3 % nebulizer solution USE 4 ML IN NEBULIZER ONCE DAILY      sodium chloride  3% 3 % nebulizer solution Inhale 4 mLs into the lungs 2 (two) times daily as needed.      umeclidinium-vilanteroL (ANORO ELLIPTA) 62.5-25 mcg/actuation DsDv Inhale 1 puff into the lungs once daily. Controller 60 each 11    valsartan (DIOVAN) 160 MG tablet Take 160 mg by mouth once daily.      vortioxetine (TRINTELLIX) 10 mg Tab Take 1 tablet by mouth daily. 30 tablet 1     Current Inpatient Medications:    Current Facility-Administered Medications:     0.45% NaCl with KCl 20 mEq infusion, , Intravenous, Continuous, Bux, Cate, DO, Last Rate: 150 mL/hr at 05/26/24 0337, New Bag at 05/26/24 0337    acetaminophen tablet 650 mg, 650 mg, Oral, Q4H PRN, Wanda Cortez, ANTONIETA    albuterol-ipratropium 2.5 mg-0.5 mg/3 mL nebulizer solution 3 mL, 3 mL, Nebulization, Q4H WAKE, Bux, Cate, DO, 3 mL at 05/26/24 0757    ALPRAZolam tablet 0.25 mg, 0.25 mg, Oral, TID, Bux, Cate, DO, 0.25 mg at 05/25/24 2037    amLODIPine tablet 10 mg, 10 mg, Oral, Daily, Jose Vázquez MD, 10 mg at 05/25/24 1035    aspirin EC tablet 81 mg, 81 mg, Oral, Daily, Jose Vázquez MD, 81 mg at 05/25/24 1038    atorvastatin tablet 80 mg, 80 mg, Oral, Daily, Jose Vázquez MD, 80 mg at 05/25/24 1038    azithromycin tablet 250 mg, 250 mg, Oral, Every Mon, Wed, Fri, Jose Vázquez MD    cefTRIAXone (Rocephin) 1 g in dextrose 5 % in water (D5W) 100 mL IVPB (MB+), 1 g, Intravenous, Q24H, Jose Vázquez MD, Stopped at 05/26/24 0113    dicyclomine tablet 20 mg, 20 mg, Oral, Daily, Jose Vázquez MD, 20 mg at 05/25/24 1035    diltiaZEM 24 hr capsule 120 mg, 120 mg, Oral, Daily, Jose Vázquez MD, 120 mg at 05/25/24 1036    doxycycline 100 mg in dextrose 5 % in water (D5W) 100 mL IVPB (MB+), 100 mg, Intravenous, Q12H, Jose Vázquez MD, Stopped at 05/26/24 0616    enoxaparin injection 40 mg, 1 mg/kg, Subcutaneous, Q24H, Jose Vázquez MD, 40 mg at 05/26/24 0516    famotidine tablet 20 mg, 20 mg, Oral, QHS, Jose Vázquez MD, 20 mg at 05/25/24 2037     fluticasone furoate-vilanteroL 100-25 mcg/dose diskus inhaler 1 puff, 1 puff, Inhalation, Daily, Bux, Cate, DO    hydrALAZINE injection 10 mg, 10 mg, Intravenous, Q4H PRN, Wanda Cortez PA-C, 10 mg at 05/25/24 0046    hydrocortisone 2.5 % rectal cream, , Rectal, BID, Bux, Cate, DO    megestroL tablet 40 mg, 40 mg, Oral, Daily, Bux, Cate, DO    melatonin tablet 6 mg, 6 mg, Oral, Nightly PRN, Lamar Matias AGACNP-BC, 6 mg at 05/24/24 2058    mirtazapine tablet 45 mg, 45 mg, Oral, Nightly, Jose Vázquez MD, 45 mg at 05/25/24 2037    ondansetron injection 4 mg, 4 mg, Intravenous, Q6H PRN, Wanda Cortez PA-C, 4 mg at 05/25/24 1502    polyethylene glycol packet 17 g, 17 g, Oral, Daily, Bux, Cate, DO, 17 g at 05/25/24 1239    potassium chloride 20 mEq in 100 mL IVPB (FOR CENTRAL LINE ADMINISTRATION ONLY), 20 mEq, Intravenous, Q2H, Bux, Cate, DO    promethazine injection 12.5 mg, 12.5 mg, Intramuscular, Q4H PRN, Bux, Cate, DO, 12.5 mg at 05/26/24 0048    scopolamine 1.3-1.5 mg (1 mg over 3 days) 1 patch, 1 patch, Transdermal, Q3 Days, Bux, Cate, DO    senna-docusate 8.6-50 mg per tablet 1 tablet, 1 tablet, Oral, Daily, Bux, Cate, DO, 1 tablet at 05/25/24 1239    simethicone chewable tablet 80 mg, 1 tablet, Oral, TID PRN, Bux, Cate, DO, 80 mg at 05/25/24 1753    vortioxetine Tab 10 mg, 10 mg, Oral, Daily, Jose Vázquez MD, 10 mg at 05/25/24 1444  VTE Risk Mitigation (From admission, onward)           Ordered     enoxaparin injection 40 mg  Every 24 hours (non-standard times)         05/24/24 2017                  Assessment:   NSTEMI, unspecified  --troponin peak 0.502  Native CAD  --LHC 07/18/18: patent dLAD stent; mLAD 35% stenosis  --PET 07/06/23: reversible perfusion abnormality in anterior region  Grade I Diastolic dysfunction  --BNP--143  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 May decrease Lovenox back to VTE prophylaxis in  am after NSTEMI has been treated for 48 hr. Continue aspirin and statin. Add Imdur 30 mg daily  DC amlodipine as patient is on 2 CCBs. Continue Cardizem  Avoid BB due to COPD  Resume valsartan 160 mg daily   Add spironolactone 25 mg daily (due to HTN and persistent hypokalemia)  Need aggressive treatment of hypokalemia-- IV repletion has been ordered per primary  Agree with palliative care-       CIS signing off    JEANA Lincoln  Cardiology  [facility]  05/26/2024

## 2024-05-27 LAB
ALBUMIN SERPL-MCNC: 2.8 G/DL (ref 3.4–4.8)
ALBUMIN/GLOB SERPL: 0.9 RATIO (ref 1.1–2)
ALP SERPL-CCNC: 59 UNIT/L (ref 40–150)
ALT SERPL-CCNC: 19 UNIT/L (ref 0–55)
ANION GAP SERPL CALC-SCNC: 8 MEQ/L
AST SERPL-CCNC: 41 UNIT/L (ref 5–34)
BILIRUB SERPL-MCNC: 0.5 MG/DL
BUN SERPL-MCNC: 8.3 MG/DL (ref 9.8–20.1)
CALCIUM SERPL-MCNC: 10.2 MG/DL (ref 8.4–10.2)
CHLORIDE SERPL-SCNC: 107 MMOL/L (ref 98–107)
CO2 SERPL-SCNC: 24 MMOL/L (ref 23–31)
CREAT SERPL-MCNC: 0.81 MG/DL (ref 0.55–1.02)
CREAT/UREA NIT SERPL: 10
GFR SERPLBLD CREATININE-BSD FMLA CKD-EPI: >60 ML/MIN/1.73/M2
GLOBULIN SER-MCNC: 3 GM/DL (ref 2.4–3.5)
GLUCOSE SERPL-MCNC: 107 MG/DL (ref 82–115)
POTASSIUM SERPL-SCNC: 3 MMOL/L (ref 3.5–5.1)
PROT SERPL-MCNC: 5.8 GM/DL (ref 5.8–7.6)
SODIUM SERPL-SCNC: 139 MMOL/L (ref 136–145)

## 2024-05-27 PROCEDURE — 94640 AIRWAY INHALATION TREATMENT: CPT

## 2024-05-27 PROCEDURE — 25000242 PHARM REV CODE 250 ALT 637 W/ HCPCS: Performed by: STUDENT IN AN ORGANIZED HEALTH CARE EDUCATION/TRAINING PROGRAM

## 2024-05-27 PROCEDURE — 63700000 PHARM REV CODE 250 ALT 637 W/O HCPCS: Performed by: STUDENT IN AN ORGANIZED HEALTH CARE EDUCATION/TRAINING PROGRAM

## 2024-05-27 PROCEDURE — 25000003 PHARM REV CODE 250: Performed by: STUDENT IN AN ORGANIZED HEALTH CARE EDUCATION/TRAINING PROGRAM

## 2024-05-27 PROCEDURE — 27000221 HC OXYGEN, UP TO 24 HOURS

## 2024-05-27 PROCEDURE — 99900031 HC PATIENT EDUCATION (STAT)

## 2024-05-27 PROCEDURE — 63600175 PHARM REV CODE 636 W HCPCS: Performed by: STUDENT IN AN ORGANIZED HEALTH CARE EDUCATION/TRAINING PROGRAM

## 2024-05-27 PROCEDURE — 99900035 HC TECH TIME PER 15 MIN (STAT)

## 2024-05-27 PROCEDURE — 25000003 PHARM REV CODE 250: Performed by: NURSE PRACTITIONER

## 2024-05-27 PROCEDURE — 21400001 HC TELEMETRY ROOM

## 2024-05-27 PROCEDURE — 94760 N-INVAS EAR/PLS OXIMETRY 1: CPT

## 2024-05-27 PROCEDURE — 80053 COMPREHEN METABOLIC PANEL: CPT | Performed by: STUDENT IN AN ORGANIZED HEALTH CARE EDUCATION/TRAINING PROGRAM

## 2024-05-27 PROCEDURE — 36415 COLL VENOUS BLD VENIPUNCTURE: CPT | Performed by: STUDENT IN AN ORGANIZED HEALTH CARE EDUCATION/TRAINING PROGRAM

## 2024-05-27 PROCEDURE — 99222 1ST HOSP IP/OBS MODERATE 55: CPT | Mod: ,,, | Performed by: INTERNAL MEDICINE

## 2024-05-27 RX ORDER — ENOXAPARIN SODIUM 100 MG/ML
30 INJECTION SUBCUTANEOUS
Status: DISCONTINUED | OUTPATIENT
Start: 2024-05-28 | End: 2024-05-29 | Stop reason: HOSPADM

## 2024-05-27 RX ORDER — ROFLUMILAST 500 UG/1
500 TABLET ORAL DAILY
Status: DISCONTINUED | OUTPATIENT
Start: 2024-05-27 | End: 2024-05-29 | Stop reason: HOSPADM

## 2024-05-27 RX ADMIN — MIRTAZAPINE 45 MG: 15 TABLET, FILM COATED ORAL at 08:05

## 2024-05-27 RX ADMIN — SPIRONOLACTONE 25 MG: 25 TABLET ORAL at 09:05

## 2024-05-27 RX ADMIN — DILTIAZEM HYDROCHLORIDE 120 MG: 120 CAPSULE, COATED, EXTENDED RELEASE ORAL at 09:05

## 2024-05-27 RX ADMIN — ASPIRIN 81 MG: 81 TABLET, COATED ORAL at 09:05

## 2024-05-27 RX ADMIN — AZITHROMYCIN DIHYDRATE 250 MG: 250 TABLET ORAL at 06:05

## 2024-05-27 RX ADMIN — POTASSIUM CHLORIDE AND SODIUM CHLORIDE: 450; 150 INJECTION, SOLUTION INTRAVENOUS at 03:05

## 2024-05-27 RX ADMIN — ALPRAZOLAM 0.25 MG: 0.25 TABLET ORAL at 08:05

## 2024-05-27 RX ADMIN — CEFTRIAXONE SODIUM 1 G: 1 INJECTION, POWDER, FOR SOLUTION INTRAMUSCULAR; INTRAVENOUS at 01:05

## 2024-05-27 RX ADMIN — HYDROCORTISONE: 25 CREAM TOPICAL at 08:05

## 2024-05-27 RX ADMIN — DICYCLOMINE HYDROCHLORIDE 20 MG: 20 TABLET ORAL at 09:05

## 2024-05-27 RX ADMIN — FAMOTIDINE 20 MG: 20 TABLET, FILM COATED ORAL at 06:05

## 2024-05-27 RX ADMIN — ENOXAPARIN SODIUM 40 MG: 40 INJECTION SUBCUTANEOUS at 06:05

## 2024-05-27 RX ADMIN — IPRATROPIUM BROMIDE AND ALBUTEROL SULFATE 3 ML: .5; 3 SOLUTION RESPIRATORY (INHALATION) at 08:05

## 2024-05-27 RX ADMIN — ISOSORBIDE MONONITRATE 30 MG: 30 TABLET, EXTENDED RELEASE ORAL at 09:05

## 2024-05-27 RX ADMIN — Medication 6 MG: at 08:05

## 2024-05-27 RX ADMIN — HYDROCORTISONE: 25 CREAM TOPICAL at 09:05

## 2024-05-27 RX ADMIN — VORTIOXETINE 10 MG: 10 TABLET, FILM COATED ORAL at 09:05

## 2024-05-27 RX ADMIN — DOXYCYCLINE 100 MG: 100 INJECTION, POWDER, LYOPHILIZED, FOR SOLUTION INTRAVENOUS at 06:05

## 2024-05-27 RX ADMIN — ALPRAZOLAM 0.25 MG: 0.25 TABLET ORAL at 03:05

## 2024-05-27 RX ADMIN — IPRATROPIUM BROMIDE AND ALBUTEROL SULFATE 3 ML: .5; 3 SOLUTION RESPIRATORY (INHALATION) at 05:05

## 2024-05-27 RX ADMIN — ALPRAZOLAM 0.25 MG: 0.25 TABLET ORAL at 09:05

## 2024-05-27 RX ADMIN — PANTOPRAZOLE SODIUM 40 MG: 40 TABLET, DELAYED RELEASE ORAL at 09:05

## 2024-05-27 RX ADMIN — ROFLUMILAST 500 MCG: 500 TABLET ORAL at 10:05

## 2024-05-27 RX ADMIN — UMECLIDINIUM BROMIDE AND VILANTEROL TRIFENATATE 1 PUFF: 62.5; 25 POWDER RESPIRATORY (INHALATION) at 09:05

## 2024-05-27 RX ADMIN — POTASSIUM BICARBONATE 40 MEQ: 391 TABLET, EFFERVESCENT ORAL at 10:05

## 2024-05-27 RX ADMIN — ATORVASTATIN CALCIUM 80 MG: 40 TABLET, FILM COATED ORAL at 09:05

## 2024-05-27 RX ADMIN — IPRATROPIUM BROMIDE AND ALBUTEROL SULFATE 3 ML: .5; 3 SOLUTION RESPIRATORY (INHALATION) at 11:05

## 2024-05-27 RX ADMIN — POTASSIUM CHLORIDE AND SODIUM CHLORIDE: 450; 150 INJECTION, SOLUTION INTRAVENOUS at 04:05

## 2024-05-27 RX ADMIN — MEGESTROL ACETATE 40 MG: 20 TABLET ORAL at 10:05

## 2024-05-27 RX ADMIN — IPRATROPIUM BROMIDE AND ALBUTEROL SULFATE 3 ML: .5; 3 SOLUTION RESPIRATORY (INHALATION) at 07:05

## 2024-05-27 NOTE — PROGRESS NOTES
[facility] Hospital Medicine Progress Note        Chief Complaint: Inpatient Follow-up for     HPI:   Mrs Walden is a 73 year old lady with PMH of HTN, HLD, CAD/Stents, PAD/B/L Stents, GERD, COPD on home O2, Fe Def Anemia, Prior MAC Infection on Chronic Suppressive Therapy who was admitted on 05/24 with complaints of nausea, vomiting for the last 2-3 days particularly on attempting PO intake. She has also been experiencing worsening SOB, fatigue and lethargy. At bedside she endorsed central sharp chest pain which was described to be non-radiating and only present since earlier in the morning on day of admission. Reported poor PO intake for the last few days. Denied any fever, hematemesis, dysphagia, night sweats, headache, numbness, weakness, dizziness, lightheadedness or LOC but did admit to having chronic epigastric abdominal pain, chills, cough productive of white sputum. On admission noted to be afebile, normotensive and saturating well on 3 L O2. Labs showed HGB/HCT of 11.5/35.2, WBCs of 5.72, platelets of 225, potassium of 2.3, calcium of 14.2, HC03 of 34, BUN/creatinine of 26.8/1.15, AST/ALT of 18/11, ALP of 72.  BNP of 143.8, troponin of 0.019 which trended up to 0.502, lactic acid of 1.3.  Respiratory PCR panel done which was unremarkable.  Blood cultures sent off.  CXR showed hyperinflated lungs with right upper zone irregular opacity with no obvious infiltrates/consolidates or effusions.  CTA chest showed no PE with stenosis of distal right main pulmonary artery, right upper and lower pulmonary arteries, right upper lobe soft tissue opacity with calcifications which was seen on prior exams as well, associated right upper lobe atelectasis, left suprahilar soft tissue opacity with calcifications also seen on prior exam, left infrahilar soft tissue opacity with some calcifications seen on prior exam, subpleural area of nodular opacity as well as some scarring in left lower lobe  also seen on previous exam, some emphysematous changes noted B/L.  U/S retroperitoneum done which showed mild bilateral hydronephrosis. Admitted to hospital medicine. Pulmonology consulted.      Pulmonology note reviewed and treatment history of MAC infection noted.  Previous AFB smears have been negative with no growth on 02/09/2024, 03/27/2024 with AFB smear/mycobacterial culture from 04/17 still pending.  Patient remains on chronic suppressive treatment with azithromycin TIW.     General: alert lady lying comfortably in bed, in no acute distress  HENT: NC in place; oral and oropharyngeal mucosa moist, pink, with no erythema or exudates, no ear pain or discharge  Neck: normal neck movement, no lymph nodes or swellings, no JVD or Carotid bruit  Respiratory: clear breathing sounds bilaterally, no crackles, rales, ronchi or wheezes  Cardiovascular: clear S1 and S2, no murmurs, rubs or gallops  Peripheral Vascular: no lesions, ulcers or erosions, normal peripheral pulses and no pedal edema  Gastrointestinal: soft, non-tender, non-distended abdomen, no guarding, rigidity or rebound tenderness, normal bowel sounds  Integumentary: normal skin color, no rashes or lesions  Neuro: AAO x 3; motor strength 5/5 in B/L UEs & LEs; sensation intact to gross and fine touch B/L; CN II-XII grossly intact     Will start on DuoNeb q4hrs & IV Rocephin/Doxycycline. Will continue PO Azithromycin TIW. Pulmonology on-board; will follow recommendations. In light of ongoing chest pain & troponinemia, will start FD Lovenox & consult CIS. Will keep trending Troponins. Will start IV NS for WISAM & worsened hypercalcemia likely d/t intravascular depletion. Will give SC Calcitonin BID x 2 doses given corrected Calcium is > 14 with symptoms of lethargy & dyspnea. Will hold off on IV Zoledronic Acid for now and dose if Calcium levels do not show improvement. Metabolic alkalemia noted likely d/t N/V. Will replace K. Family requested Palliative Care  consult for discussion regarding goals of care; will place consult. Continuing home Amlodipine 10 mg, ASA 81 mg, Atorvastatin 80 mg, Bentyl 20 mg, Diltiazem 120 mg, Pepcid 20 mg, Mirtazapine 45 mg. Continue monitoring symptoms.      Interval Hx:   Patient seen and examined by bedside.  Daughter by bedside.  Discussed lab results with patient and family this morning.  Patient's nausea has slowly improved.  Discuss further management and patient and daughter reiterated that they do not want any sort of invasive workup.    Case was discussed with patient's nurse and  on the floor.    Objective/physical exam:  General:  Chronically ill-appearing female, cachectic  Chest: Clear to auscultation bilaterally, on her chronic home O2 requirement  Heart: RRR, +S1, S2, no appreciable murmur  Abdomen: Soft, nontender, BS +  MSK: Warm, no lower extremity edema, no clubbing or cyanosis  Neurologic: Alert and oriented x4, Cranial nerve II-XII intact, Strength 5/5 in all 4 extremities    VITAL SIGNS: 24 HRS MIN & MAX LAST   Temp  Min: 97.6 °F (36.4 °C)  Max: 98.6 °F (37 °C) 98.4 °F (36.9 °C)   BP  Min: 138/65  Max: 170/67 (!) 142/63   Pulse  Min: 59  Max: 71  64   Resp  Min: 15  Max: 20 19   SpO2  Min: 91 %  Max: 99 % 97 %     I have reviewed the following labs:  Recent Labs   Lab 05/24/24  1109 05/25/24  0246 05/26/24  0552   WBC 5.72 6.33 7.25   RBC 4.28 3.49* 3.60*   HGB 11.5* 9.4* 9.7*   HCT 35.2* 28.5* 29.6*   MCV 82.2 81.7 82.2   MCH 26.9* 26.9* 26.9*   MCHC 32.7* 33.0 32.8*   RDW 13.8 14.3 14.2    258 299   MPV 9.9 10.0 10.0     Recent Labs   Lab 05/24/24  1109 05/24/24  1906 05/25/24  0246 05/26/24  0552 05/27/24  0813      < > 145 142 139   K 2.3*   < > 2.4* 2.8* 3.0*   CO2 34*   < > 33* 30 24   BUN 26.8*   < > 20.3* 11.4 8.3*   CREATININE 1.15*   < > 1.05* 0.77 0.81   CALCIUM 14.2*   < > 12.7* 10.6* 10.2   MG 2.20  --   --   --   --    ALBUMIN 3.1*  --  2.8* 2.8* 2.8*   ALKPHOS 72  --  67 62 59    ALT 11  --  13 16 19   AST 18  --  22 36* 41*   BILITOT 0.8  --  0.5 0.4 0.5    < > = values in this interval not displayed.     Microbiology Results (last 7 days)       Procedure Component Value Units Date/Time    Blood Culture #1 **CANNOT BE ORDERED STAT** [[id number]]  (Normal) Collected: 05/24/24 1113    Order Status: Completed Specimen: Blood Updated: 05/27/24 1300     Blood Culture No Growth At 72 Hours    Blood Culture #1 **CANNOT BE ORDERED STAT** [[id number]]  (Normal) Collected: 05/24/24 1114    Order Status: Completed Specimen: Blood Updated: 05/27/24 1300     Blood Culture No Growth At 72 Hours             See below for Radiology    Scheduled Med:   albuterol-ipratropium  3 mL Nebulization Q4H WAKE    ALPRAZolam  0.25 mg Oral TID    aspirin  81 mg Oral Daily    atorvastatin  80 mg Oral Daily    azithromycin  250 mg Oral Every Mon, Wed, Fri    cefTRIAXone (Rocephin) IV (PEDS and ADULTS)  1 g Intravenous Q24H    dicyclomine  20 mg Oral Daily    diltiaZEM  120 mg Oral Daily    doxycycline IV (PEDS and ADULTS)  100 mg Intravenous Q12H    enoxparin  1 mg/kg Subcutaneous Q24H    famotidine  20 mg Oral Daily    hydrocortisone   Rectal BID    isosorbide mononitrate  30 mg Oral Daily    megestroL  40 mg Oral Daily    mirtazapine  45 mg Oral Nightly    pantoprazole  40 mg Oral Daily    polyethylene glycol  17 g Oral Daily    roflumilast  500 mcg Oral Daily    scopolamine  1 patch Transdermal Q3 Days    senna-docusate 8.6-50 mg  1 tablet Oral Daily    spironolactone  25 mg Oral Daily    umeclidinium-vilanteroL  1 puff Inhalation Daily    vortioxetine  10 mg Oral Daily      Continuous Infusions:   0.45 % NaCl with KCl 20 mEq   Intravenous Continuous 125 mL/hr at 05/27/24 1504 New Bag at 05/27/24 1504      PRN Meds:    Current Facility-Administered Medications:     acetaminophen, 650 mg, Oral, Q4H PRN    hydrALAZINE, 10 mg, Intravenous, Q4H PRN    melatonin, 6 mg, Oral, Nightly PRN    ondansetron, 4 mg,  Intravenous, Q6H PRN    promethazine, 12.5 mg, Intramuscular, Q4H PRN    simethicone, 1 tablet, Oral, TID PRN     Patient meets ASPEN criteria for  (unable to eval, will attempt upon F/U) malnutrition of   per RD assessment as evidenced by:                       A minimum of two characteristics is recommended for diagnosis of either severe or non-severe malnutrition.    Assessment/Plan:  Severe hypokalemia , improving  Metabolic alkalosis, secondary to nausea and vomiting , resolved  WISAM, likely secondary to dehydration , resolved  History of MAC, currently on chronic suppressive therapy with azithromycin   Chronic right middle lobe atelectasis and linear atelectasis right upper lobe in a left upper lobe   Chronic obstructive pulmonary disease with severe baseline obstruction  Chronic hypoxic respiratory failure  Chronic epigastric abdominal pain , GI extensive workup has been done in the past with no clear cause  Elevated troponin   Hypercalcemia, possibly likely secondary to dehydration resolved  ? 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 Zithromax and plans are for lifetime antibiotic suppressive therapy   Slight hemoglobin drop in Labs, likely dilutional secondary to fluids  Continue Rocephin and doxycycline for possible pneumonia, we will continue for 5 days to complete course  Another 40 mEq of potassium ordered today  Cardiology consulted for elevated troponins, family does not want any invasive workup.  Recommend medical management of NSTEMI and to continue full-dose Lovenox 48 hours and then can decrease back  to prophylaxis Lovenox  We will deescalate to prophylaxis Lovenox today  Imdur added 30 mg daily along with spironolactone 25 mg  Amlodipine has been discontinued as patient is already on Cardizem, avoid beta-blocker due to COPD  Phenergan Zofran and scopolamine patch on board for nausea  Nutrition consult, appreciate recommendations   Calcitonin x2 doses given for hypercalcemia, likely secondary to dehydration   Continue IV fluids, hypercalcemia resolved  Recheck labs in a.m.  Patient says pain started after loss of her has been, suspect lot of stress/anxiety related   Hemorrhoidal cream ordered for pain  Patient has a decent bowel movement after enema  Pulmonology initially consulted due to significant pulmonary findings and left suprahilar mass, concern for malignancy however patient states that she does not want any invasive workup including biopsy.  Appetite stimulant added  Palliative care consult for goals of care discussion, patient decided on hospice options   Further recs based on clinical course      VTE prophylaxis:  Lovenox    Patient condition:  Stable/Fair/Guarded/ Serious/ Critical    Anticipated discharge and Disposition:   To be determined      All diagnosis and differential diagnosis have been reviewed; assessment and plan has been documented; I have personally reviewed the labs and test results that are presently available; I have reviewed the patients medication list; I have reviewed the consulting providers response and recommendations. I have reviewed or attempted to review medical records based upon their availability    All of the patient's questions have been  addressed and answered. Patient's is agreeable to the above stated plan. I will continue to monitor closely and make adjustments to medical management as needed.  _____________________________________________________________________    Nutrition Status:    Radiology:  I have personally reviewed the following imaging and agree with the  radiologist.     Echo    Left Ventricle: The left ventricle is normal in size. Normal wall   thickness. There is normal systolic function with a visually estimated   ejection fraction of 60 - 65%. Grade I diastolic dysfunction.    Right Ventricle: Normal right ventricular cavity size. Systolic   function is normal.    Aortic Valve: There is mild aortic valve sclerosis. There is mild   aortic regurgitation.    Mitral Valve: There is no stenosis. The mean pressure gradient across   the mitral valve is 3 mmHg at a heart rate of  bpm. There is mild   regurgitation.    Tricuspid Valve: There is mild regurgitation.    IVC/SVC: Normal venous pressure at 3 mmHg.      Cate Hairston DO  Department of Hospital Medicine  [facility]  05/27/2024

## 2024-05-27 NOTE — NURSING
Pt is requesting a saline nose spray to be ordered for her to help with cpap discomfort of drying up her nose. NP Lamar Matias was notified. NP ordered saline nose spray.

## 2024-05-27 NOTE — PLAN OF CARE
Dorothy confirms she met with pt and daughter as scheduled. They are considering hospice and mentioned that they are feeling rushed through the hospitalization. Madeleine recommended to them to consider respite at the [facility] as the plan appears to be for pt to go to her daughters house in [facility]. Daughter is recovering from health issues and Dorothy felt utilizing [facility] would allow addition time before daughter assumed caregiver role.  Virginie with [facility] will follow up with pt and daughter tomorrow to see if they have made a definitive decision.

## 2024-05-27 NOTE — CONSULTS
Patient Name: Alesia Walden   MRN: [id number]   Admission Date: 5/24/2024   Hospital Length of Stay: 3   Attending Provider: Cate Hairston DO   Consulting Provider: Demian Taylor M.D.  Resident: Jackie Sepulveda MD  Reason for Consult: Goals of Care  Primary Care Physician: Chuck Estrella MD     Principal Problem: <principal problem not specified>     Patient information was obtained from patient, relative(s), past medical records, and ER records.      Final diagnoses:  [R06.02] Shortness of breath (Primary)     Assessment/Plan:     We reviewed the patient and family's understanding of the seriousness of the illness and its expected prognosis. We discussed the patient's goals of care and treatment preferences. We discussed the difference between palliative and curative medicine. We explained the differences between home health, palliative and hospice care. We clarified current code status. We identified the surrogate decision maker or health care POA. We discussed the patient's chosen code status as listed above. We answered all questions and we formulated a plan including recommendations for symptom management and how to best achieve goals of care.     We reviewed the patient's current clinical status with the nurse. We reviewed clinical documentation, labs and imaging.     Symptom management review: shortness of breath with increased oxygen requirement, difficulty with appetite and weight loss, trouble ambulating     Advance Care Planning     Date: 05/27/2024    Power of   I initiated the process of voluntary advance care planning today and explained the importance of this process to the patient.  I introduced the concept of advance directives to the patient, as well. Then the patient received detailed information about the importance of designating a Health Care Power of  (HCPOA). She was also instructed to communicate with this person about their wishes for future healthcare, should she become  sick and lose decision-making capacity. The patient has previously appointed a HCPOA. After our discussion, the patient has decided to complete a HCPOA and has appointed her daughter, health care agent:  Maeve Walden  & health care agent number:  [id number]           Code Status  In light of the patients advanced and life limiting illness,I engaged the the patient and family in a voluntary conversation about the patient's preferences for care  at the very end of life. The patient wishes to have a natural, peaceful death.  Along those lines, the patient does not wish to have CPR or other invasive treatments performed when her heart and/or breathing stops. I communicated to the patient and family that a DNR order would be placed in her medical record to reflect this preference.       [facility]  I engaged the patient and family in a voluntary conversation about advance care planning and we specifically addressed what the goals of care would be moving forward, in light of the patient's change in clinical status, specifically her decline in eating and breathing and being able to move around independent.  We did not specifically address the patient's likely prognosis, which is poor.  We explored the patient's values and preferences for future care.  The patient and family endorses that what is most important right now is to focus on spending time at home, remaining as independent as possible, and quality of life, even if it means sacrificing a little time    Accordingly, we have decided that the best plan to meet the patient's goals includes enrolling in hospice care    I did explain the role for hospice care at this stage of the patient's illness, including its ability to help the patient live with the best quality of life possible.  We will be making a hospice referral.               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 She has also been experiencing worsening SOB, fatigue and lethargy. At bedside she endorsed central sharp chest pain which was described to be non-radiating and only present since earlier in the morning on day of admission. Reported poor PO intake for the last few days. Denied any fever, hematemesis, dysphagia, night sweats, headache, numbness, weakness, dizziness, lightheadedness or LOC but did admit to having chronic epigastric abdominal pain, chills, cough productive of white sputum. On admission noted to be afebile, normotensive and saturating well on 3 L O2. Labs showed HGB/HCT of 11.5/35.2, WBCs of 5.72, platelets of 225, potassium of 2.3, calcium of 14.2, HC03 of 34, BUN/creatinine of 26.8/1.15, AST/ALT of 18/11, ALP of 72. BNP of 143.8, troponin of 0.019 which trended up to 0.502, lactic acid of 1.3. Respiratory PCR panel done which was unremarkable. Blood cultures sent off. CXR showed hyperinflated lungs with right upper zone irregular opacity with no obvious infiltrates/consolidates or effusions. CTA chest showed no PE with stenosis of distal right main pulmonary artery, right upper and lower pulmonary arteries, right upper lobe soft tissue opacity with calcifications which was seen on prior exams as well, associated right upper lobe atelectasis, left suprahilar soft tissue opacity with calcifications also seen on prior exam, left infrahilar soft tissue opacity with some calcifications seen on prior exam, subpleural area of nodular opacity as well as some scarring in left lower lobe also seen on previous exam, some emphysematous changes  noted B/L. U/S retroperitoneum done which showed mild bilateral hydronephrosis. Admitted to hospital medicine. Pulmonology consulted. At the time of palliative care discussion patient satting well on 4 L O2.       Active Ambulatory Problems     Diagnosis Date Noted    Chronic obstructive pulmonary disease (COPD)     Pulmonary infection due to Mycobacterium avium complex 07/25/2022    Obstructive sleep apnea 08/04/2022    Arthralgia 12/27/2022    Hypokalemia 12/27/2022    Primary hypertension 12/27/2022    Immunization due 12/27/2022    Depressive disorder 12/27/2022    Right wrist pain 03/02/2023    De Quervain's tenosynovitis, right 03/02/2023    Anxiety     Positive depression screening 08/29/2023    Fatigue 08/29/2023    Screening mammogram for breast cancer 11/27/2023    Angina pectoris, unspecified 02/02/2024    Atherosclerosis of aorta 02/02/2024    Non-recurrent acute serous otitis media of both ears 05/20/2024    Weight loss 05/20/2024     Resolved Ambulatory Problems     Diagnosis Date Noted    Chronic kidney disease, stage 3a 02/02/2024     Past Medical History:   Diagnosis Date    Abdominal pain, LLQ     Abdominal pain, RLQ     Acute bilateral low back pain with left-sided sciatica     Cardiac microvascular disease     Carotid bruit     Constipation, unspecified     COPD (chronic obstructive pulmonary disease)     Diverticulitis     Dyslipidemia     GERD (gastroesophageal reflux disease), chronic     Heart valve disorder     Iron deficiency anemia     Moderate aortic regurgitation     PTCA with stent insertion     Shortness of breath     Status post laser ablation of incompetent vein     Thyroid enlarged     Varicose veins of bilateral lower extremities with other complications     Venous insufficiency         Past Surgical History:   Procedure Laterality Date    COLONOSCOPY N/A 2/6/2023    Procedure: COLON;  Surgeon: Dorothea Davey III, MD;  Location: [facility] ENDOSCOPY;  Service: Gastroenterology;   Laterality: N/A;    COLONOSCOPY, WITH POLYPECTOMY USING SNARE  2/6/2023    Procedure: COLONOSCOPY, WITH POLYPECTOMY USING SNARE;  Surgeon: Dorothea Davey III, MD;  Location: [facility] ENDOSCOPY;  Service: Gastroenterology;;    EGD, WITH CLOSED BIOPSY N/A 3/7/2023    Procedure: EGD;  Surgeon: Dorothea Davey III, MD;  Location: [facility] ENDOSCOPY;  Service: Gastroenterology;  Laterality: N/A;  Pre OP packet for procedure was mailed to patient        Review of patient's allergies indicates:   Allergen Reactions    No known drug allergies           Current Facility-Administered Medications:     0.45% NaCl with KCl 20 mEq infusion, , Intravenous, Continuous, Bux, Cate, DO, Last Rate: 125 mL/hr at 05/27/24 0420, New Bag at 05/27/24 0420    acetaminophen tablet 650 mg, 650 mg, Oral, Q4H PRN, Wanda Cortez PA-C    albuterol-ipratropium 2.5 mg-0.5 mg/3 mL nebulizer solution 3 mL, 3 mL, Nebulization, Q4H WAKE, Bux, Cate, DO, 3 mL at 05/27/24 1102    ALPRAZolam tablet 0.25 mg, 0.25 mg, Oral, TID, Bux, Cate, DO, 0.25 mg at 05/27/24 0958    aspirin EC tablet 81 mg, 81 mg, Oral, Daily, Jose Vázquez MD, 81 mg at 05/27/24 0957    atorvastatin tablet 80 mg, 80 mg, Oral, Daily, Jose Vázquez MD, 80 mg at 05/27/24 0957    azithromycin tablet 250 mg, 250 mg, Oral, Every Mon, Wed, Fri, Jose Vázquez MD    cefTRIAXone (Rocephin) 1 g in dextrose 5 % in water (D5W) 100 mL IVPB (MB+), 1 g, Intravenous, Q24H, Jose Vázquez MD, Stopped at 05/27/24 0215    dicyclomine tablet 20 mg, 20 mg, Oral, Daily, Jose Vázquez MD, 20 mg at 05/27/24 0958    diltiaZEM 24 hr capsule 120 mg, 120 mg, Oral, Daily, Jose Vázquez MD, 120 mg at 05/27/24 0958    doxycycline 100 mg in dextrose 5 % in water (D5W) 100 mL IVPB (MB+), 100 mg, Intravenous, Q12H, Jose Vázquez MD, Stopped at 05/27/24 0708    enoxaparin injection 40 mg, 1 mg/kg, Subcutaneous, Q24H, Jose Vázquez MD, 40 mg at 05/27/24 0607    famotidine tablet 20 mg, 20 mg,  Oral, Daily, Bux, Cate, DO    hydrALAZINE injection 10 mg, 10 mg, Intravenous, Q4H PRN, Wanda Cortez PA-C, 10 mg at 05/25/24 0046    hydrocortisone 2.5 % rectal cream, , Rectal, BID, Bux, Cate, DO, Given at 05/27/24 0959    isosorbide mononitrate 24 hr tablet 30 mg, 30 mg, Oral, Daily, Elaine Thapa FNP, 30 mg at 05/27/24 0958    megestroL tablet 40 mg, 40 mg, Oral, Daily, Bux, Cate, DO, 40 mg at 05/27/24 1008    melatonin tablet 6 mg, 6 mg, Oral, Nightly PRN, Lamar Matias AGACNP-BC, 6 mg at 05/26/24 2113    mirtazapine tablet 45 mg, 45 mg, Oral, Nightly, Jose Vázquez MD, 45 mg at 05/26/24 2113    ondansetron injection 4 mg, 4 mg, Intravenous, Q6H PRN, Wanda Cortez PA-C, 4 mg at 05/26/24 2147    pantoprazole EC tablet 40 mg, 40 mg, Oral, Daily, Bux, Cate, DO, 40 mg at 05/27/24 0957    polyethylene glycol packet 17 g, 17 g, Oral, Daily, Bux, Cate, DO, 17 g at 05/26/24 0943    promethazine injection 12.5 mg, 12.5 mg, Intramuscular, Q4H PRN, Bux, Cate, DO, 12.5 mg at 05/26/24 0048    roflumilast (DALIRESP) tablet 500 mcg, 500 mcg, Oral, Daily, Bux, Cate, DO, 500 mcg at 05/27/24 1009    scopolamine 1.3-1.5 mg (1 mg over 3 days) 1 patch, 1 patch, Transdermal, Q3 Days, Bux, Cate, DO, 1 patch at 05/26/24 1310    senna-docusate 8.6-50 mg per tablet 1 tablet, 1 tablet, Oral, Daily, Bux, Cate, DO, 1 tablet at 05/26/24 0939    simethicone chewable tablet 80 mg, 1 tablet, Oral, TID PRN, BuxCate, DO, 80 mg at 05/26/24 2113    spironolactone tablet 25 mg, 25 mg, Oral, Daily, Elaine Thapa, FNP, 25 mg at 05/27/24 0957    umeclidinium-vilanteroL 62.5-25 mcg/actuation DsDv 1 puff, 1 puff, Inhalation, Daily, BuxCate, DO, 1 puff at 05/27/24 0959    vortioxetine Tab 10 mg, 10 mg, Oral, Daily, Jose Vázquez MD, 10 mg at 05/27/24 0959       Current Facility-Administered Medications:     acetaminophen, 650 mg, Oral, Q4H PRN    hydrALAZINE, 10 mg, Intravenous, Q4H PRN     "melatonin, 6 mg, Oral, Nightly PRN    ondansetron, 4 mg, Intravenous, Q6H PRN    promethazine, 12.5 mg, Intramuscular, Q4H PRN    simethicone, 1 tablet, Oral, TID PRN     Family History   Problem Relation Name Age of Onset    Heart attack Mother      COPD Father          sepsis    No Known Problems Brother          Review of Systems   Constitutional:  Positive for appetite change and unexpected weight change. Negative for fatigue and fever.   HENT:  Negative for congestion, sore throat and trouble swallowing.    Eyes:  Negative for pain.   Respiratory:  Positive for shortness of breath. Negative for cough, chest tightness, wheezing and stridor.    Cardiovascular:  Negative for chest pain and leg swelling.   Gastrointestinal:  Positive for abdominal pain, constipation and nausea. Negative for blood in stool, diarrhea and vomiting.   Endocrine: Negative for cold intolerance and heat intolerance.   Genitourinary:  Negative for difficulty urinating.   Musculoskeletal:  Negative for arthralgias, back pain and myalgias.   Skin:  Negative for color change.   Neurological:  Negative for dizziness, seizures, weakness and headaches.            Objective:   BP (!) 159/70   Pulse 65   Temp 97.6 °F (36.4 °C) (Oral)   Resp 20   Ht 5' 2.99" (1.6 m)   Wt 38.6 kg (85 lb)   SpO2 99%   Breastfeeding No   BMI 15.06 kg/m²      Physical Exam  Constitutional:       Appearance: She is ill-appearing (chachetic).   HENT:      Head: Normocephalic and atraumatic.      Right Ear: External ear normal.      Left Ear: External ear normal.      Nose: Nose normal.      Mouth/Throat:      Mouth: Mucous membranes are moist.   Eyes:      Pupils: Pupils are equal, round, and reactive to light.   Cardiovascular:      Rate and Rhythm: Normal rate and regular rhythm.      Pulses: Normal pulses.      Heart sounds: Normal heart sounds.   Pulmonary:      Effort: Pulmonary effort is normal.      Comments: On 4L O2 NC  Abdominal:      General: Bowel " sounds are normal.      Palpations: Abdomen is soft.   Musculoskeletal:         General: Normal range of motion.   Skin:     General: Skin is warm.   Neurological:      Mental Status: She is alert.            Review of Symptoms  Review of Symptoms      Symptom Assessment (ESAS 0-10 Scale)  Pain:  0  Dyspnea:  0  Anxiety:  5  Nausea:  0  Depression:  0  Anorexia:  0  Fatigue:  0  Insomnia:  0  Restlessness:  0  Agitation:  0     CAM / Delirium:  Negative  Constipation:  Negative  Diarrhea:  Negative    Constipation:  Constipation    Psychosocial/Cultural:   See Palliative Psychosocial Note: No  **Primary  to Follow**  Palliative Care  Consult: No      Advance Care Planning   Advance Directives:   Living Will: No        Oral Declaration: No    Do Not Resuscitate Status: Yes    Medical Power of : Yes      Decision Making:  Patient answered questions  Goals of Care: The patient and family endorses that what is most important right now is to focus on spending time at home, remaining as independent as possible, and quality of life, even if it means sacrificing a little time    Accordingly, we have decided that the best plan to meet the patient's goals includes enrolling in hospice care            Caregiver burden formerly assessed: No

## 2024-05-27 NOTE — PLAN OF CARE
Spoke with pt and daughter Maeve who is at bedside . They would like to talk with hospice and tells me the dr mentioned this. List of hospices given, [facility] for [facility]. Referral sent, Dorothy will meed with Maeve and pt 4- 5 pm today in room daughter is here all day and noc. Pt has not decided that she wants hospice at this time but wants more info as she may decide she would like hospice. Briefly discussed what hospice can/can't do in general. Maeve lives in [facility] and since pt does live alone she is thinking in the near future pt may need to come to stay with her at her home in [place].   Dorothy will be able to provide info and has chart to review

## 2024-05-27 NOTE — PLAN OF CARE
Problem: Adult Inpatient Plan of Care  Goal: Plan of Care Review  Outcome: Progressing  Goal: Absence of Hospital-Acquired Illness or Injury  Outcome: Progressing  Goal: Optimal Comfort and Wellbeing  Outcome: Progressing     Problem: Coping Ineffective  Goal: Effective Coping  Outcome: Progressing     Problem: Skin Injury Risk Increased  Goal: Skin Health and Integrity  Outcome: Progressing

## 2024-05-28 LAB
ANION GAP SERPL CALC-SCNC: 3 MEQ/L
BUN SERPL-MCNC: 5.6 MG/DL (ref 9.8–20.1)
CALCIUM SERPL-MCNC: 9.7 MG/DL (ref 8.4–10.2)
CHLORIDE SERPL-SCNC: 111 MMOL/L (ref 98–107)
CO2 SERPL-SCNC: 27 MMOL/L (ref 23–31)
CREAT SERPL-MCNC: 0.78 MG/DL (ref 0.55–1.02)
CREAT/UREA NIT SERPL: 7
ERYTHROCYTE [DISTWIDTH] IN BLOOD BY AUTOMATED COUNT: 14.3 % (ref 11.5–17)
GFR SERPLBLD CREATININE-BSD FMLA CKD-EPI: >60 ML/MIN/1.73/M2
GLUCOSE SERPL-MCNC: 86 MG/DL (ref 82–115)
HCT VFR BLD AUTO: 26.9 % (ref 37–47)
HGB BLD-MCNC: 8.9 G/DL (ref 12–16)
MCH RBC QN AUTO: 26.8 PG (ref 27–31)
MCHC RBC AUTO-ENTMCNC: 33.1 G/DL (ref 33–36)
MCV RBC AUTO: 81 FL (ref 80–94)
NRBC BLD AUTO-RTO: 0 %
PLATELET # BLD AUTO: 303 X10(3)/MCL (ref 130–400)
PMV BLD AUTO: 9.5 FL (ref 7.4–10.4)
POTASSIUM SERPL-SCNC: 3.5 MMOL/L (ref 3.5–5.1)
RBC # BLD AUTO: 3.32 X10(6)/MCL (ref 4.2–5.4)
SODIUM SERPL-SCNC: 141 MMOL/L (ref 136–145)
WBC # SPEC AUTO: 5.58 X10(3)/MCL (ref 4.5–11.5)

## 2024-05-28 PROCEDURE — 94760 N-INVAS EAR/PLS OXIMETRY 1: CPT

## 2024-05-28 PROCEDURE — 21400001 HC TELEMETRY ROOM

## 2024-05-28 PROCEDURE — 63600175 PHARM REV CODE 636 W HCPCS: Performed by: PHYSICIAN ASSISTANT

## 2024-05-28 PROCEDURE — 36415 COLL VENOUS BLD VENIPUNCTURE: CPT | Performed by: STUDENT IN AN ORGANIZED HEALTH CARE EDUCATION/TRAINING PROGRAM

## 2024-05-28 PROCEDURE — 27000221 HC OXYGEN, UP TO 24 HOURS

## 2024-05-28 PROCEDURE — 25000242 PHARM REV CODE 250 ALT 637 W/ HCPCS: Performed by: STUDENT IN AN ORGANIZED HEALTH CARE EDUCATION/TRAINING PROGRAM

## 2024-05-28 PROCEDURE — 25000003 PHARM REV CODE 250: Performed by: STUDENT IN AN ORGANIZED HEALTH CARE EDUCATION/TRAINING PROGRAM

## 2024-05-28 PROCEDURE — 63600175 PHARM REV CODE 636 W HCPCS: Performed by: STUDENT IN AN ORGANIZED HEALTH CARE EDUCATION/TRAINING PROGRAM

## 2024-05-28 PROCEDURE — 99900035 HC TECH TIME PER 15 MIN (STAT)

## 2024-05-28 PROCEDURE — 99900031 HC PATIENT EDUCATION (STAT)

## 2024-05-28 PROCEDURE — 94640 AIRWAY INHALATION TREATMENT: CPT

## 2024-05-28 PROCEDURE — 80048 BASIC METABOLIC PNL TOTAL CA: CPT | Performed by: STUDENT IN AN ORGANIZED HEALTH CARE EDUCATION/TRAINING PROGRAM

## 2024-05-28 PROCEDURE — 25000003 PHARM REV CODE 250: Performed by: NURSE PRACTITIONER

## 2024-05-28 PROCEDURE — 94761 N-INVAS EAR/PLS OXIMETRY MLT: CPT

## 2024-05-28 PROCEDURE — 85027 COMPLETE CBC AUTOMATED: CPT | Performed by: STUDENT IN AN ORGANIZED HEALTH CARE EDUCATION/TRAINING PROGRAM

## 2024-05-28 RX ADMIN — ATORVASTATIN CALCIUM 80 MG: 40 TABLET, FILM COATED ORAL at 09:05

## 2024-05-28 RX ADMIN — DILTIAZEM HYDROCHLORIDE 120 MG: 120 CAPSULE, COATED, EXTENDED RELEASE ORAL at 09:05

## 2024-05-28 RX ADMIN — IPRATROPIUM BROMIDE AND ALBUTEROL SULFATE 3 ML: .5; 3 SOLUTION RESPIRATORY (INHALATION) at 07:05

## 2024-05-28 RX ADMIN — MEGESTROL ACETATE 40 MG: 20 TABLET ORAL at 09:05

## 2024-05-28 RX ADMIN — ROFLUMILAST 500 MCG: 500 TABLET ORAL at 10:05

## 2024-05-28 RX ADMIN — CEFTRIAXONE SODIUM 1 G: 1 INJECTION, POWDER, FOR SOLUTION INTRAMUSCULAR; INTRAVENOUS at 01:05

## 2024-05-28 RX ADMIN — PANTOPRAZOLE SODIUM 40 MG: 40 TABLET, DELAYED RELEASE ORAL at 09:05

## 2024-05-28 RX ADMIN — ENOXAPARIN SODIUM 30 MG: 30 INJECTION SUBCUTANEOUS at 05:05

## 2024-05-28 RX ADMIN — FAMOTIDINE 20 MG: 20 TABLET, FILM COATED ORAL at 09:05

## 2024-05-28 RX ADMIN — UMECLIDINIUM BROMIDE AND VILANTEROL TRIFENATATE 1 PUFF: 62.5; 25 POWDER RESPIRATORY (INHALATION) at 10:05

## 2024-05-28 RX ADMIN — ALPRAZOLAM 0.25 MG: 0.25 TABLET ORAL at 08:05

## 2024-05-28 RX ADMIN — ALPRAZOLAM 0.25 MG: 0.25 TABLET ORAL at 02:05

## 2024-05-28 RX ADMIN — DOXYCYCLINE 100 MG: 100 INJECTION, POWDER, LYOPHILIZED, FOR SOLUTION INTRAVENOUS at 05:05

## 2024-05-28 RX ADMIN — ONDANSETRON 4 MG: 2 INJECTION INTRAMUSCULAR; INTRAVENOUS at 05:05

## 2024-05-28 RX ADMIN — POTASSIUM CHLORIDE AND SODIUM CHLORIDE: 450; 150 INJECTION, SOLUTION INTRAVENOUS at 02:05

## 2024-05-28 RX ADMIN — IPRATROPIUM BROMIDE AND ALBUTEROL SULFATE 3 ML: .5; 3 SOLUTION RESPIRATORY (INHALATION) at 04:05

## 2024-05-28 RX ADMIN — VORTIOXETINE 10 MG: 10 TABLET, FILM COATED ORAL at 10:05

## 2024-05-28 RX ADMIN — SPIRONOLACTONE 25 MG: 25 TABLET ORAL at 09:05

## 2024-05-28 RX ADMIN — ASPIRIN 81 MG: 81 TABLET, COATED ORAL at 09:05

## 2024-05-28 RX ADMIN — MIRTAZAPINE 45 MG: 15 TABLET, FILM COATED ORAL at 08:05

## 2024-05-28 RX ADMIN — HYDROCORTISONE: 25 CREAM TOPICAL at 08:05

## 2024-05-28 RX ADMIN — ALPRAZOLAM 0.25 MG: 0.25 TABLET ORAL at 09:05

## 2024-05-28 RX ADMIN — DICYCLOMINE HYDROCHLORIDE 20 MG: 20 TABLET ORAL at 09:05

## 2024-05-28 RX ADMIN — IPRATROPIUM BROMIDE AND ALBUTEROL SULFATE 3 ML: .5; 3 SOLUTION RESPIRATORY (INHALATION) at 12:05

## 2024-05-28 RX ADMIN — HYDROCORTISONE: 25 CREAM TOPICAL at 10:05

## 2024-05-28 RX ADMIN — ISOSORBIDE MONONITRATE 30 MG: 30 TABLET, EXTENDED RELEASE ORAL at 09:05

## 2024-05-28 NOTE — PROGRESS NOTES
"Advance Care Planning     Date: 05/28/2024    [facility]  I engaged the patient in a voluntary conversation about advance care planning and we specifically addressed what the goals of care would be moving forward, in light of the patient's change in clinical status, specifically patient's current status, goals of care, and symptom management.  We did specifically address the patient's likely prognosis, which is fair .  We explored the patient's values and preferences for future care.  The patient endorses that what is most important right now is to focus on spending time at home, avoiding the hospital, remaining as independent as possible, symptom/pain control, improvement in condition but with limits to invasive therapies, and comfort and QOL       Palliative care team, Brenda MATHEW and Neida BARBER, discussed with the patient after she met with 2 hospice agencies goals of care. Patient expressed she "is worried and scared of all the changes", I reassured her that the purpose of the hospice agency nurses are to make sure that she is comfortable and are there to identify any concerns she may have. Patient states she has agreed to go home with hospice services to her daughter's home. she stated she is very overwhelmed with all the sudden changes that are occurring. Emotional support provided. Palliative did review and discussed selections on [facility] and she is going to review selections chosen during palliative visit tonight. States "I want to make sure of my selections" Verbalized understanding of the importance of the document and her wishes and informed will follow up with her in the morning to complete it.     Accordingly, we have decided that the best plan to meet the patient's goals includes continuing with treatment and enrolling in hospice care    I did explain the role for hospice care at this stage of the patient's illness, including its ability to help the patient live with the best quality of life possible.  Patient " has elected to enroll into hospice services, case management sent referral to agency of patient's choice.

## 2024-05-28 NOTE — PLAN OF CARE
Problem: Adult Inpatient Plan of Care  Goal: Plan of Care Review  Outcome: Progressing  Goal: Absence of Hospital-Acquired Illness or Injury  Outcome: Progressing  Goal: Optimal Comfort and Wellbeing  Outcome: Progressing     Problem: Coping Ineffective  Goal: Effective Coping  Outcome: Progressing     Problem: Fall Injury Risk  Goal: Absence of Fall and Fall-Related Injury  Outcome: Progressing

## 2024-05-28 NOTE — PROGRESS NOTES
[facility] Hospital Medicine Progress Note        Chief Complaint: Inpatient Follow-up for     HPI:   Mrs Walden is a 73 year old lady with PMH of HTN, HLD, CAD/Stents, PAD/B/L Stents, GERD, COPD on home O2, Fe Def Anemia, Prior MAC Infection on Chronic Suppressive Therapy who was admitted on 05/24 with complaints of nausea, vomiting for the last 2-3 days particularly on attempting PO intake. She has also been experiencing worsening SOB, fatigue and lethargy. At bedside she endorsed central sharp chest pain which was described to be non-radiating and only present since earlier in the morning on day of admission. Reported poor PO intake for the last few days. Denied any fever, hematemesis, dysphagia, night sweats, headache, numbness, weakness, dizziness, lightheadedness or LOC but did admit to having chronic epigastric abdominal pain, chills, cough productive of white sputum. On admission noted to be afebile, normotensive and saturating well on 3 L O2. Labs showed HGB/HCT of 11.5/35.2, WBCs of 5.72, platelets of 225, potassium of 2.3, calcium of 14.2, HC03 of 34, BUN/creatinine of 26.8/1.15, AST/ALT of 18/11, ALP of 72.  BNP of 143.8, troponin of 0.019 which trended up to 0.502, lactic acid of 1.3.  Respiratory PCR panel done which was unremarkable.  Blood cultures sent off.  CXR showed hyperinflated lungs with right upper zone irregular opacity with no obvious infiltrates/consolidates or effusions.  CTA chest showed no PE with stenosis of distal right main pulmonary artery, right upper and lower pulmonary arteries, right upper lobe soft tissue opacity with calcifications which was seen on prior exams as well, associated right upper lobe atelectasis, left suprahilar soft tissue opacity with calcifications also seen on prior exam, left infrahilar soft tissue opacity with some calcifications seen on prior exam, subpleural area of nodular opacity as well as some scarring in left lower lobe  also seen on previous exam, some emphysematous changes noted B/L.  U/S retroperitoneum done which showed mild bilateral hydronephrosis. Admitted to hospital medicine. Pulmonology consulted.      Pulmonology note reviewed and treatment history of MAC infection noted.  Previous AFB smears have been negative with no growth on 02/09/2024, 03/27/2024 with AFB smear/mycobacterial culture from 04/17 still pending.  Patient remains on chronic suppressive treatment with azithromycin TIW.     General: alert lady lying comfortably in bed, in no acute distress  HENT: NC in place; oral and oropharyngeal mucosa moist, pink, with no erythema or exudates, no ear pain or discharge  Neck: normal neck movement, no lymph nodes or swellings, no JVD or Carotid bruit  Respiratory: clear breathing sounds bilaterally, no crackles, rales, ronchi or wheezes  Cardiovascular: clear S1 and S2, no murmurs, rubs or gallops  Peripheral Vascular: no lesions, ulcers or erosions, normal peripheral pulses and no pedal edema  Gastrointestinal: soft, non-tender, non-distended abdomen, no guarding, rigidity or rebound tenderness, normal bowel sounds  Integumentary: normal skin color, no rashes or lesions  Neuro: AAO x 3; motor strength 5/5 in B/L UEs & LEs; sensation intact to gross and fine touch B/L; CN II-XII grossly intact     Will start on DuoNeb q4hrs & IV Rocephin/Doxycycline. Will continue PO Azithromycin TIW. Pulmonology on-board; will follow recommendations. In light of ongoing chest pain & troponinemia, will start FD Lovenox & consult CIS. Will keep trending Troponins. Will start IV NS for WISAM & worsened hypercalcemia likely d/t intravascular depletion. Will give SC Calcitonin BID x 2 doses given corrected Calcium is > 14 with symptoms of lethargy & dyspnea. Will hold off on IV Zoledronic Acid for now and dose if Calcium levels do not show improvement. Metabolic alkalemia noted likely d/t N/V. Will replace K. Family requested Palliative Care  consult for discussion regarding goals of care; will place consult. Continuing home Amlodipine 10 mg, ASA 81 mg, Atorvastatin 80 mg, Bentyl 20 mg, Diltiazem 120 mg, Pepcid 20 mg, Mirtazapine 45 mg. Continue monitoring symptoms.      Interval Hx:   Patient seen and examined by bedside.  No acute overnight events.  Labs stable this a.m..  Patient continues to be on IV fluids.  Patient states that she had a some hemoptysis after treatment this morning.  Used to remain anxious, patient says that she does not want to leave until she feels stronger    Case was discussed with patient's nurse and  on the floor.    Objective/physical exam:  General:  Chronically ill-appearing female, cachectic  Chest: Clear to auscultation bilaterally, on her chronic home O2 requirement  Heart: RRR, +S1, S2, no appreciable murmur  Abdomen: Soft, nontender, BS +  MSK: Warm, no lower extremity edema, no clubbing or cyanosis  Neurologic: Alert and oriented x4, Cranial nerve II-XII intact, Strength 5/5 in all 4 extremities    VITAL SIGNS: 24 HRS MIN & MAX LAST   Temp  Min: 97.6 °F (36.4 °C)  Max: 99.3 °F (37.4 °C) 98.6 °F (37 °C)   BP  Min: 142/63  Max: 173/77 (!) 162/80   Pulse  Min: 64  Max: 91  73   Resp  Min: 16  Max: 20 20   SpO2  Min: 97 %  Max: 100 % 100 %     I have reviewed the following labs:  Recent Labs   Lab 05/25/24  0246 05/26/24  0552 05/28/24  0352   WBC 6.33 7.25 5.58   RBC 3.49* 3.60* 3.32*   HGB 9.4* 9.7* 8.9*   HCT 28.5* 29.6* 26.9*   MCV 81.7 82.2 81.0   MCH 26.9* 26.9* 26.8*   MCHC 33.0 32.8* 33.1   RDW 14.3 14.2 14.3    299 303   MPV 10.0 10.0 9.5     Recent Labs   Lab 05/24/24  1109 05/24/24  1906 05/25/24  0246 05/26/24  0552 05/27/24  0813 05/28/24  0352      < > 145 142 139 141   K 2.3*   < > 2.4* 2.8* 3.0* 3.5   CO2 34*   < > 33* 30 24 27   BUN 26.8*   < > 20.3* 11.4 8.3* 5.6*   CREATININE 1.15*   < > 1.05* 0.77 0.81 0.78   CALCIUM 14.2*   < > 12.7* 10.6* 10.2 9.7   MG 2.20  --   --   --   --    --    ALBUMIN 3.1*  --  2.8* 2.8* 2.8*  --    ALKPHOS 72  --  67 62 59  --    ALT 11  --  13 16 19  --    AST 18  --  22 36* 41*  --    BILITOT 0.8  --  0.5 0.4 0.5  --     < > = values in this interval not displayed.     Microbiology Results (last 7 days)       Procedure Component Value Units Date/Time    Blood Culture #1 **CANNOT BE ORDERED STAT** [[id number]]  (Normal) Collected: 05/24/24 1113    Order Status: Completed Specimen: Blood Updated: 05/27/24 1300     Blood Culture No Growth At 72 Hours    Blood Culture #1 **CANNOT BE ORDERED STAT** [[id number]]  (Normal) Collected: 05/24/24 1114    Order Status: Completed Specimen: Blood Updated: 05/27/24 1300     Blood Culture No Growth At 72 Hours             See below for Radiology    Scheduled Med:   albuterol-ipratropium  3 mL Nebulization Q4H WAKE    ALPRAZolam  0.25 mg Oral TID    aspirin  81 mg Oral Daily    atorvastatin  80 mg Oral Daily    azithromycin  250 mg Oral Every Mon, Wed, Fri    cefTRIAXone (Rocephin) IV (PEDS and ADULTS)  1 g Intravenous Q24H    dicyclomine  20 mg Oral Daily    diltiaZEM  120 mg Oral Daily    doxycycline IV (PEDS and ADULTS)  100 mg Intravenous Q12H    enoxparin  30 mg Subcutaneous Q24H    famotidine  20 mg Oral Daily    hydrocortisone   Rectal BID    isosorbide mononitrate  30 mg Oral Daily    megestroL  40 mg Oral Daily    mirtazapine  45 mg Oral Nightly    pantoprazole  40 mg Oral Daily    polyethylene glycol  17 g Oral Daily    roflumilast  500 mcg Oral Daily    scopolamine  1 patch Transdermal Q3 Days    senna-docusate 8.6-50 mg  1 tablet Oral Daily    spironolactone  25 mg Oral Daily    umeclidinium-vilanteroL  1 puff Inhalation Daily    vortioxetine  10 mg Oral Daily      Continuous Infusions:   0.45 % NaCl with KCl 20 mEq   Intravenous Continuous 125 mL/hr at 05/28/24 0239 New Bag at 05/28/24 0239      PRN Meds:    Current Facility-Administered Medications:     acetaminophen, 650 mg, Oral, Q4H PRN    hydrALAZINE, 10 mg,  Intravenous, Q4H PRN    melatonin, 6 mg, Oral, Nightly PRN    ondansetron, 4 mg, Intravenous, Q6H PRN    promethazine, 12.5 mg, Intramuscular, Q4H PRN    simethicone, 1 tablet, Oral, TID PRN     Patient meets ASPEN criteria for  (unable to eval, will attempt upon F/U) malnutrition of   per RD assessment as evidenced by:                       A minimum of two characteristics is recommended for diagnosis of either severe or non-severe malnutrition.    Assessment/Plan:  Severe hypokalemia , resolved  Metabolic alkalosis, secondary to nausea and vomiting , resolved  WISAM, likely secondary to dehydration , resolved  History of MAC, currently on chronic suppressive therapy with azithromycin   Chronic right middle lobe atelectasis and linear atelectasis right upper lobe in a left upper lobe   Chronic obstructive pulmonary disease with severe baseline obstruction  Chronic hypoxic respiratory failure  Chronic epigastric abdominal pain , GI extensive workup has been done in the past with no clear cause  Elevated troponin   Hypercalcemia, possibly likely secondary to dehydration resolved  ? 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 Zithromax and plans are for lifetime antibiotic suppressive therapy   Slight hemoglobin drop in Labs, likely dilutional secondary to fluids  Plan to discontinue antibiotics after today.  Hypokalemia resolved  Cardiology consulted for elevated troponins, family does not want any invasive workup.  Recommend medical  management of NSTEMI and to continue full-dose Lovenox 48 hours and then can decrease back to prophylaxis Lovenox  De-escalated to prophylaxis Lovenox on 05/27  Imdur added 30 mg daily along with spironolactone 25 mg  Amlodipine has been discontinued as patient is already on Cardizem, avoid beta-blocker due to COPD  Phenergan Zofran and scopolamine patch on board for nausea  Nutrition consult, appreciate recommendations , patient says that she is unable to tolerate any boost all protein shake  Calcitonin x2 doses given for hypercalcemia, likely secondary to dehydration   Continue IV fluids, hypercalcemia resolved  Recheck labs in a.m.  Patient says pain started after loss of her has been, suspect lot of stress/anxiety related   Pulmonology initially consulted due to significant pulmonary findings and left suprahilar mass, concern for malignancy however patient states that she does not want any invasive workup including biopsy.  Appetite stimulant added  Palliative care consult for goals of care discussion, patient decided on hospice options   Definite close situation as patient is slightly unrealistic with her goals, had a jaylon conversation with her this a.m. about several comorbid conditions that we will continue to make patient week including her suprahilar mass, severe COPD with obstruction, anxiety/stress, and protein malnutrition and unable to tolerate much food.  Spoke to patient that we can not continue on IV fluids or send her home on IV fluids unfortunately so patient will need another nutrition route if she decides however patient does not want a PEG tube.  Discussed with patient then comfort care may be her best option where she can do pleasure feeding and avoid hospitalizations  We will have palliative Care again comes speak with the patient to review goals of care and discuss hospice options  States that she has been having increased abdominal pain, we will obtain abdominal ultrasound  Further recs  based on clinical course      VTE prophylaxis:  Lovenox    Patient condition:  Stable/Fair/Guarded/ Serious/ Critical    Anticipated discharge and Disposition:   To be determined      All diagnosis and differential diagnosis have been reviewed; assessment and plan has been documented; I have personally reviewed the labs and test results that are presently available; I have reviewed the patients medication list; I have reviewed the consulting providers response and recommendations. I have reviewed or attempted to review medical records based upon their availability    All of the patient's questions have been  addressed and answered. Patient's is agreeable to the above stated plan. I will continue to monitor closely and make adjustments to medical management as needed.  _____________________________________________________________________    Nutrition Status:    Radiology:  I have personally reviewed the following imaging and agree with the radiologist.     Echo    Left Ventricle: The left ventricle is normal in size. Normal wall   thickness. There is normal systolic function with a visually estimated   ejection fraction of 60 - 65%. Grade I diastolic dysfunction.    Right Ventricle: Normal right ventricular cavity size. Systolic   function is normal.    Aortic Valve: There is mild aortic valve sclerosis. There is mild   aortic regurgitation.    Mitral Valve: There is no stenosis. The mean pressure gradient across   the mitral valve is 3 mmHg at a heart rate of  bpm. There is mild   regurgitation.    Tricuspid Valve: There is mild regurgitation.    IVC/SVC: Normal venous pressure at 3 mmHg.      Cate Hairston DO  Department of Hospital Medicine  [facility]  05/28/2024

## 2024-05-28 NOTE — PLAN OF CARE
Discharge home to [facility], [facility] [place] with [facility].  Planned for discharge 5/29.  All equipment delivered to home today/[place] with [facility]

## 2024-05-28 NOTE — PLAN OF CARE
05/28/24 1605   Discharge Assessment   Assessment Type Discharge Planning Assessment   Confirmed/corrected address, phone number and insurance Yes   Confirmed Demographics Correct on Facesheet   Source of Information patient   Does patient/caregiver understand observation status   (inpatient)   Communicated DGINA with patient/caregiver Date not available/Unable to determine   Reason For Admission SOB   Facility Arrived From: home   Do you expect to return to your current living situation? Yes   Who are your caregiver(s) and their phone number(s)? dgtr   Current cognitive status: Unable to Assess   Walking or Climbing Stairs Difficulty yes   Mobility Management weakness   Dressing/Bathing Difficulty yes   Equipment Currently Used at Home walker, rolling   Do you currently have service(s) that help you manage your care at home? No   Do you take prescription medications? Yes   Do you have prescription coverage? Yes   Coverage [employer]   Do you have any problems affording any of your prescribed medications? No   Who is going to help you get home at discharge? [facility]   How do you get to doctors appointments? family or friend will provide   Discharge Plan A Home with family;Hospice/home   Discharge Plan B Home with family;Hospice/home   DME Needed Upon Discharge    (per hospice)   Discharge Plan discussed with: Patient   Transition of Care Barriers None     Discharge home to [facility] house with [facility].  Planned for discharge 5/29

## 2024-05-29 VITALS
OXYGEN SATURATION: 99 % | HEART RATE: 79 BPM | BODY MASS INDEX: 15.06 KG/M2 | WEIGHT: 85 LBS | RESPIRATION RATE: 20 BRPM | TEMPERATURE: 98 F | HEIGHT: 63 IN | DIASTOLIC BLOOD PRESSURE: 79 MMHG | SYSTOLIC BLOOD PRESSURE: 146 MMHG

## 2024-05-29 PROBLEM — R53.81 PHYSICAL DECONDITIONING: Status: ACTIVE | Noted: 2024-05-29

## 2024-05-29 LAB
ANION GAP SERPL CALC-SCNC: 8 MEQ/L
BACTERIA BLD CULT: NORMAL
BACTERIA BLD CULT: NORMAL
BUN SERPL-MCNC: 5.1 MG/DL (ref 9.8–20.1)
CALCIUM SERPL-MCNC: 9.4 MG/DL (ref 8.4–10.2)
CHLORIDE SERPL-SCNC: 112 MMOL/L (ref 98–107)
CO2 SERPL-SCNC: 21 MMOL/L (ref 23–31)
CREAT SERPL-MCNC: 0.74 MG/DL (ref 0.55–1.02)
CREAT/UREA NIT SERPL: 7
ERYTHROCYTE [DISTWIDTH] IN BLOOD BY AUTOMATED COUNT: 14.6 % (ref 11.5–17)
GFR SERPLBLD CREATININE-BSD FMLA CKD-EPI: >60 ML/MIN/1.73/M2
GLUCOSE SERPL-MCNC: 84 MG/DL (ref 82–115)
HCT VFR BLD AUTO: 27.7 % (ref 37–47)
HGB BLD-MCNC: 9.2 G/DL (ref 12–16)
MCH RBC QN AUTO: 27 PG (ref 27–31)
MCHC RBC AUTO-ENTMCNC: 33.2 G/DL (ref 33–36)
MCV RBC AUTO: 81.2 FL (ref 80–94)
NRBC BLD AUTO-RTO: 0 %
PLATELET # BLD AUTO: 267 X10(3)/MCL (ref 130–400)
PMV BLD AUTO: 10.7 FL (ref 7.4–10.4)
POTASSIUM SERPL-SCNC: 3.7 MMOL/L (ref 3.5–5.1)
RBC # BLD AUTO: 3.41 X10(6)/MCL (ref 4.2–5.4)
SODIUM SERPL-SCNC: 141 MMOL/L (ref 136–145)
WBC # SPEC AUTO: 5.21 X10(3)/MCL (ref 4.5–11.5)

## 2024-05-29 PROCEDURE — 63600175 PHARM REV CODE 636 W HCPCS: Performed by: STUDENT IN AN ORGANIZED HEALTH CARE EDUCATION/TRAINING PROGRAM

## 2024-05-29 PROCEDURE — 63600175 PHARM REV CODE 636 W HCPCS: Performed by: PHYSICIAN ASSISTANT

## 2024-05-29 PROCEDURE — 94640 AIRWAY INHALATION TREATMENT: CPT

## 2024-05-29 PROCEDURE — 25000003 PHARM REV CODE 250: Performed by: STUDENT IN AN ORGANIZED HEALTH CARE EDUCATION/TRAINING PROGRAM

## 2024-05-29 PROCEDURE — 25000242 PHARM REV CODE 250 ALT 637 W/ HCPCS: Performed by: STUDENT IN AN ORGANIZED HEALTH CARE EDUCATION/TRAINING PROGRAM

## 2024-05-29 PROCEDURE — 85027 COMPLETE CBC AUTOMATED: CPT | Performed by: STUDENT IN AN ORGANIZED HEALTH CARE EDUCATION/TRAINING PROGRAM

## 2024-05-29 PROCEDURE — 99900035 HC TECH TIME PER 15 MIN (STAT)

## 2024-05-29 PROCEDURE — 94760 N-INVAS EAR/PLS OXIMETRY 1: CPT

## 2024-05-29 PROCEDURE — 25000003 PHARM REV CODE 250: Performed by: NURSE PRACTITIONER

## 2024-05-29 PROCEDURE — 27000221 HC OXYGEN, UP TO 24 HOURS

## 2024-05-29 PROCEDURE — 36415 COLL VENOUS BLD VENIPUNCTURE: CPT | Performed by: STUDENT IN AN ORGANIZED HEALTH CARE EDUCATION/TRAINING PROGRAM

## 2024-05-29 PROCEDURE — 80048 BASIC METABOLIC PNL TOTAL CA: CPT | Performed by: STUDENT IN AN ORGANIZED HEALTH CARE EDUCATION/TRAINING PROGRAM

## 2024-05-29 RX ORDER — POLYETHYLENE GLYCOL 3350 17 G/17G
17 POWDER, FOR SOLUTION ORAL DAILY
Start: 2024-05-30 | End: 2024-08-28

## 2024-05-29 RX ORDER — MEGESTROL ACETATE 40 MG/1
40 TABLET ORAL DAILY
Start: 2024-05-29 | End: 2025-05-29

## 2024-05-29 RX ORDER — SPIRONOLACTONE 25 MG/1
25 TABLET ORAL DAILY
Start: 2024-05-30 | End: 2025-05-30

## 2024-05-29 RX ORDER — POTASSIUM CHLORIDE 750 MG/1
20 CAPSULE, EXTENDED RELEASE ORAL DAILY
Qty: 180 CAPSULE | Refills: 0 | Status: SHIPPED | OUTPATIENT
Start: 2024-05-29 | End: 2024-08-27

## 2024-05-29 RX ORDER — SCOLOPAMINE TRANSDERMAL SYSTEM 1 MG/1
1 PATCH, EXTENDED RELEASE TRANSDERMAL
Start: 2024-05-29 | End: 2024-06-28

## 2024-05-29 RX ORDER — ISOSORBIDE MONONITRATE 30 MG/1
60 TABLET, EXTENDED RELEASE ORAL DAILY
Start: 2024-05-30 | End: 2025-05-30

## 2024-05-29 RX ORDER — AMOXICILLIN 250 MG
1 CAPSULE ORAL DAILY
Start: 2024-05-30 | End: 2024-08-28

## 2024-05-29 RX ADMIN — PANTOPRAZOLE SODIUM 40 MG: 40 TABLET, DELAYED RELEASE ORAL at 09:05

## 2024-05-29 RX ADMIN — SPIRONOLACTONE 25 MG: 25 TABLET ORAL at 09:05

## 2024-05-29 RX ADMIN — CEFTRIAXONE SODIUM 1 G: 1 INJECTION, POWDER, FOR SOLUTION INTRAMUSCULAR; INTRAVENOUS at 12:05

## 2024-05-29 RX ADMIN — DOXYCYCLINE 100 MG: 100 INJECTION, POWDER, LYOPHILIZED, FOR SOLUTION INTRAVENOUS at 04:05

## 2024-05-29 RX ADMIN — DILTIAZEM HYDROCHLORIDE 120 MG: 120 CAPSULE, COATED, EXTENDED RELEASE ORAL at 09:05

## 2024-05-29 RX ADMIN — UMECLIDINIUM BROMIDE AND VILANTEROL TRIFENATATE 1 PUFF: 62.5; 25 POWDER RESPIRATORY (INHALATION) at 09:05

## 2024-05-29 RX ADMIN — ENOXAPARIN SODIUM 30 MG: 30 INJECTION SUBCUTANEOUS at 05:05

## 2024-05-29 RX ADMIN — MEGESTROL ACETATE 40 MG: 20 TABLET ORAL at 11:05

## 2024-05-29 RX ADMIN — FAMOTIDINE 20 MG: 20 TABLET, FILM COATED ORAL at 09:05

## 2024-05-29 RX ADMIN — IPRATROPIUM BROMIDE AND ALBUTEROL SULFATE 3 ML: .5; 3 SOLUTION RESPIRATORY (INHALATION) at 07:05

## 2024-05-29 RX ADMIN — HYDRALAZINE HYDROCHLORIDE 10 MG: 20 INJECTION INTRAMUSCULAR; INTRAVENOUS at 04:05

## 2024-05-29 RX ADMIN — POTASSIUM CHLORIDE AND SODIUM CHLORIDE: 450; 150 INJECTION, SOLUTION INTRAVENOUS at 12:05

## 2024-05-29 RX ADMIN — IPRATROPIUM BROMIDE AND ALBUTEROL SULFATE 3 ML: .5; 3 SOLUTION RESPIRATORY (INHALATION) at 11:05

## 2024-05-29 RX ADMIN — VORTIOXETINE 10 MG: 10 TABLET, FILM COATED ORAL at 09:05

## 2024-05-29 RX ADMIN — ALPRAZOLAM 0.25 MG: 0.25 TABLET ORAL at 09:05

## 2024-05-29 RX ADMIN — ATORVASTATIN CALCIUM 80 MG: 40 TABLET, FILM COATED ORAL at 09:05

## 2024-05-29 RX ADMIN — ISOSORBIDE MONONITRATE 30 MG: 30 TABLET, EXTENDED RELEASE ORAL at 09:05

## 2024-05-29 RX ADMIN — DICYCLOMINE HYDROCHLORIDE 20 MG: 20 TABLET ORAL at 09:05

## 2024-05-29 RX ADMIN — ROFLUMILAST 500 MCG: 500 TABLET ORAL at 09:05

## 2024-05-29 RX ADMIN — ASPIRIN 81 MG: 81 TABLET, COATED ORAL at 09:05

## 2024-05-29 RX ADMIN — SCOPOLAMINE 1 PATCH: 1 PATCH TRANSDERMAL at 11:05

## 2024-05-29 NOTE — PLAN OF CARE
Problem: Adult Inpatient Plan of Care  Goal: Plan of Care Review  Outcome: Progressing  Goal: Patient-Specific Goal (Individualized)  Outcome: Progressing  Goal: Absence of Hospital-Acquired Illness or Injury  Outcome: Progressing  Goal: Optimal Comfort and Wellbeing  Outcome: Progressing  Goal: Readiness for Transition of Care  Outcome: Progressing     Problem: Coping Ineffective  Goal: Effective Coping  Outcome: Progressing     Problem: Skin Injury Risk Increased  Goal: Skin Health and Integrity  Outcome: Progressing     Problem: Fall Injury Risk  Goal: Absence of Fall and Fall-Related Injury  Outcome: Progressing

## 2024-05-29 NOTE — DISCHARGE SUMMARY
[facility] Hospital Medicine Discharge Summary    Admit Date: 5/24/2024  Discharge Date and Time: 5/29/20245:55 PM  Admitting Physician: EZEQUIEL Team  Discharging Physician: Cate Hairston DO.  Primary Care Physician: Chuck Estrella MD  Consults: Cardiology and palliative care    Discharge Diagnoses:  Severe hypokalemia , resolved  Metabolic alkalosis, secondary to nausea and vomiting , resolved  WISAM, likely secondary to dehydration , resolved  History of MAC, currently on chronic suppressive therapy with azithromycin   Chronic right middle lobe atelectasis and linear atelectasis right upper lobe in a left upper lobe   Chronic obstructive pulmonary disease with severe baseline obstruction  Chronic hypoxic respiratory failure  Chronic epigastric abdominal pain , GI extensive workup has been done in the past with no clear cause  Elevated troponin   Hypercalcemia, possibly likely secondary to dehydration resolved  ? 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 She has also been experiencing worsening SOB, fatigue and lethargy. At bedside she endorsed central sharp chest pain which was described to be non-radiating and only present since earlier in the morning on day of admission. Reported poor PO intake for the last few days. Denied any fever, hematemesis, dysphagia, night  sweats, headache, numbness, weakness, dizziness, lightheadedness or LOC but did admit to having chronic epigastric abdominal pain, chills, cough productive of white sputum. On admission noted to be afebile, normotensive and saturating well on 3 L O2. Labs showed HGB/HCT of 11.5/35.2, WBCs of 5.72, platelets of 225, potassium of 2.3, calcium of 14.2, HC03 of 34, BUN/creatinine of 26.8/1.15, AST/ALT of 18/11, ALP of 72.  BNP of 143.8, troponin of 0.019 which trended up to 0.502, lactic acid of 1.3.  Respiratory PCR panel done which was unremarkable.  Blood cultures sent off.  CXR showed hyperinflated lungs with right upper zone irregular opacity with no obvious infiltrates/consolidates or effusions.  CTA chest showed no PE with stenosis of distal right main pulmonary artery, right upper and lower pulmonary arteries, right upper lobe soft tissue opacity with calcifications which was seen on prior exams as well, associated right upper lobe atelectasis, left suprahilar soft tissue opacity with calcifications also seen on prior exam, left infrahilar soft tissue opacity with some calcifications seen on prior exam, subpleural area of nodular opacity as well as some scarring in left lower lobe also seen on previous exam, some emphysematous changes noted B/L.  U/S retroperitoneum done which showed mild bilateral hydronephrosis. Admitted to hospital medicine. Pulmonology consulted.      Pulmonology note reviewed and treatment history of MAC infection noted.  Previous AFB smears have been negative with no growth on 02/09/2024, 03/27/2024 with AFB smear/mycobacterial culture from 04/17 still pending.  Patient remains on chronic suppressive treatment with azithromycin TIW.     General: alert lady lying comfortably in bed, in no acute distress  HENT: NC in place; oral and oropharyngeal mucosa moist, pink, with no erythema or exudates, no ear pain or discharge  Neck: normal neck movement, no lymph nodes or swellings, no JVD or  Carotid bruit  Respiratory: clear breathing sounds bilaterally, no crackles, rales, ronchi or wheezes  Cardiovascular: clear S1 and S2, no murmurs, rubs or gallops  Peripheral Vascular: no lesions, ulcers or erosions, normal peripheral pulses and no pedal edema  Gastrointestinal: soft, non-tender, non-distended abdomen, no guarding, rigidity or rebound tenderness, normal bowel sounds  Integumentary: normal skin color, no rashes or lesions  Neuro: AAO x 3; motor strength 5/5 in B/L UEs & LEs; sensation intact to gross and fine touch B/L; CN II-XII grossly intact     Will start on DuoNeb q4hrs & IV Rocephin/Doxycycline. Will continue PO Azithromycin TIW. Pulmonology on-board; will follow recommendations. In light of ongoing chest pain & troponinemia, will start FD Lovenox & consult CIS. Will keep trending Troponins. Will start IV NS for WISAM & worsened hypercalcemia likely d/t intravascular depletion. Will give SC Calcitonin BID x 2 doses given corrected Calcium is > 14 with symptoms of lethargy & dyspnea. Will hold off on IV Zoledronic Acid for now and dose if Calcium levels do not show improvement. Metabolic alkalemia noted likely d/t N/V. Will replace K. Family requested Palliative Care consult for discussion regarding goals of care; will place consult. Continuing home Amlodipine 10 mg, ASA 81 mg, Atorvastatin 80 mg, Bentyl 20 mg, Diltiazem 120 mg, Pepcid 20 mg, Mirtazapine 45 mg. Continue monitoring symptoms.    Cardiology was consulted for elevated troponin, patient and family stated they did not want any invasive workup.  Recommended medical management of NSTEMI and patient was continued on all full-dose Lovenox for 48 hours and then transitioned prophylaxis Lovenox.  Imdur was added to 30 mg along with the spironolactone to 12 5 mg.  Amlodipine has been discontinued as patient is started on Cardizem and avoid beta-blocker due to COPD.  Going Phenergan Zofran scopolamine patch were on board for nausea.  Patient  during hospitalization was all largely unable to tolerating boost protein shake not getting further nauseous.  Patient's hypercalcemia was resolved.  Patient's fluids were continuously given throughout hospitalization along with potassium.  Pulmonology was initially consulted and significant pulmonary finding and left suprahilar mass, concern for malignancy however patient said that she does not want any biopsy or invasive workup.  Appetite stimulant was added for patient.  Palliative care consult was done for goals of care discussion and patient decided on home with hospice.  Patient will be going home with hospice.  She finished her antibiotics on day of discharge.  Patient's labs and vitals were stable on day of discharge.  No further acute concerns at this time.  Patient will go on going home with hospice for comfort measures.    Pt was seen and examined on the day of discharge  Vitals:  VITAL SIGNS: 24 HRS MIN & MAX LAST   Temp  Min: 97.8 °F (36.6 °C)  Max: 98.9 °F (37.2 °C) 98.1 °F (36.7 °C)   BP  Min: 129/61  Max: 171/83 (!) 146/79   Pulse  Min: 77  Max: 89  79   Resp  Min: 18  Max: 20 20   SpO2  Min: 97 %  Max: 99 % 99 %       Physical Exam:  General:  Chronically ill-appearing female, cachectic  Chest: Clear to auscultation bilaterally, on her chronic home O2 requirement  Heart: RRR, +S1, S2, no appreciable murmur  Abdomen: Soft, nontender, BS +  MSK: Warm, no lower extremity edema, no clubbing or cyanosis  Neurologic: Alert and oriented x4, Cranial nerve II-XII intact, Strength 5/5 in all 4 extremities    Procedures Performed: No admission procedures for hospital encounter.     Significant Diagnostic Studies: See Full reports for all details    Recent Labs   Lab 05/26/24  0552 05/28/24  0352 05/29/24  0514   WBC 7.25 5.58 5.21   RBC 3.60* 3.32* 3.41*   HGB 9.7* 8.9* 9.2*   HCT 29.6* 26.9* 27.7*   MCV 82.2 81.0 81.2   MCH 26.9* 26.8* 27.0   MCHC 32.8* 33.1 33.2   RDW 14.2 14.3 14.6    303 267   MPV  10.0 9.5 10.7*       Recent Labs   Lab 05/24/24  1109 05/24/24  1906 05/25/24  0246 05/26/24  0552 05/27/24  0813 05/28/24  0352 05/29/24  0514      < > 145 142 139 141 141   K 2.3*   < > 2.4* 2.8* 3.0* 3.5 3.7   CO2 34*   < > 33* 30 24 27 21*   BUN 26.8*   < > 20.3* 11.4 8.3* 5.6* 5.1*   CREATININE 1.15*   < > 1.05* 0.77 0.81 0.78 0.74   CALCIUM 14.2*   < > 12.7* 10.6* 10.2 9.7 9.4   MG 2.20  --   --   --   --   --   --    ALBUMIN 3.1*  --  2.8* 2.8* 2.8*  --   --    ALKPHOS 72  --  67 62 59  --   --    ALT 11  --  13 16 19  --   --    AST 18  --  22 36* 41*  --   --    BILITOT 0.8  --  0.5 0.4 0.5  --   --     < > = values in this interval not displayed.        Microbiology Results (last 7 days)       Procedure Component Value Units Date/Time    Blood Culture #1 **CANNOT BE ORDERED STAT** [[id number]]  (Normal) Collected: 05/24/24 1113    Order Status: Completed Specimen: Blood Updated: 05/29/24 1300     Blood Culture No Growth at 5 days    Blood Culture #1 **CANNOT BE ORDERED STAT** [[id number]]  (Normal) Collected: 05/24/24 1114    Order Status: Completed Specimen: Blood Updated: 05/29/24 1300     Blood Culture No Growth at 5 days             US Abdomen Limited  Narrative: EXAMINATION:  US ABDOMEN LIMITED    CLINICAL HISTORY:  Shortness of breath    TECHNIQUE:  Multiple real-time transverse and longitudinal sections were performed of the right abdomen by the sonographer. Select images were submitted for review.    COMPARISON:  CT abdomen pelvis January 30, 2024    FINDINGS:  Liver is of unremarkable echotexture with normal contour and size. There was no delineation of discrete hepatic cystic or solid mass. Hepatic maximum diameter of 13.7 cm. There was unremarkable hepatopedal flow within the portal vein. The pancreas is without a dominant abnormality.    Gallbladder lumen is remarkable nonshadowing echogenic material consistent with sludge without exclusion of associated tiny calculi.  Gallbladder wall  thickness is within normal limits. Common bile duct caliber of 4.9 mm is within normal limits for the age. Sonographer reported negative Delgado's sign.    Normally located right kidney length measures 11.1 x 4.4 x 4.8 cm.  Right kidney in the upper pole is remarkable for 1 5 x 1.4 x 1.3 cm hyperechoic lesion which may represent angiomyolipoma with corresponding lipomatous lesion on the previous CT abdomen and pelvis.  Right renal corticomedullary differentiation is unremarkable. No evidence of hydronephrosis.  Impression: 1.  Gallbladder sludge without associated findings of acute cholecystitis.    2.  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    Electronically signed by: Prabhakar Blevins  Date:    05/28/2024  Time:    16:50         Medication List        START taking these medications      isosorbide mononitrate 30 MG 24 hr tablet  Commonly known as: IMDUR  Take 2 tablets (60 mg total) by mouth once daily.  Start taking on: May 30, 2024     megestroL 40 MG Tab  Commonly known as: MEGACE  Take 1 tablet (40 mg total) by mouth once daily.     polyethylene glycol 17 gram Pwpk  Commonly known as: GLYCOLAX  Take 17 g by mouth once daily.  Start taking on: May 30, 2024     potassium chloride 10 MEQ Cpsr  Commonly known as: MICRO-K  Take 2 capsules (20 mEq total) by mouth once daily. for 90 doses     scopolamine 1.3-1.5 mg (1 mg over 3 days)  Commonly known as: TRANSDERM-SCOP  Place 1 patch onto the skin Every 3 (three) days.     senna-docusate 8.6-50 mg 8.6-50 mg per tablet  Commonly known as: PERICOLACE  Take 1 tablet by mouth once daily.  Start taking on: May 30, 2024     spironolactone 25 MG tablet  Commonly known as: ALDACTONE  Take 1 tablet (25 mg total) by mouth once daily.  Start taking on: May 30, 2024            CONTINUE taking these medications      albuterol 90 mcg/actuation inhaler  Commonly known as: PROVENTIL/VENTOLIN HFA  Inhale 1-2 puffs into the lungs every 6 (six) hours  as needed for Wheezing. Rescue     albuterol-ipratropium 2.5 mg-0.5 mg/3 mL nebulizer solution  Commonly known as: DUO-NEB  USE 1 AMPULE IN NEBULIZER ONCE DAILY FOLLOWING AMIKACIN NEBULIZATION     alendronate 70 MG tablet  Commonly known as: FOSAMAX  TAKE 1 TABLET BY MOUTH ONCE A WEEK ON  EVERY  TUESDAY     ALIGN 4 mg capsule  Generic drug: Bifidobacterium infantis     ALPRAZolam 0.5 MG tablet  Commonly known as: XANAX  Take 1/2 tablet in am, 1/2 tablet at lunch and 1 tablet in pm.     ANORO ELLIPTA 62.5-25 mcg/actuation Dsdv  Generic drug: umeclidinium-vilanteroL  Inhale 1 puff into the lungs once daily. Controller     aspirin 81 MG EC tablet  Commonly known as: ECOTRIN     atorvastatin 80 MG tablet  Commonly known as: LIPITOR     azithromycin 250 MG tablet  Commonly known as: Z-AMANDO  Take 1 tablet (250 mg total) by mouth every Mon, Wed, Fri.     cyclobenzaprine 10 MG tablet  Commonly known as: FLEXERIL  Take 1 tablet (10 mg total) by mouth every evening.     dicyclomine 20 mg tablet  Commonly known as: BENTYL     diltiaZEM 120 MG Cp24  Commonly known as: CARDIZEM CD     diphenoxylate-atropine 2.5-0.025 mg 2.5-0.025 mg per tablet  Commonly known as: LOMOTIL     ergocalciferol 50,000 unit Cap  Commonly known as: ERGOCALCIFEROL  Take 1 capsule by mouth once a week     estradiol 0.025 mg/24 hr 0.025 mg/24 hr  APPLY 1 PATCH TOPICALLY ONCE A WEEK EVERY  FRIDAY     famotidine 40 MG tablet  Commonly known as: PEPCID     IRON-150 ORAL     L.acidophilus-B.bifidum,longum 150 mg (3 billion cell) Chew     mirtazapine 45 MG tablet  Commonly known as: REMERON     mupirocin 2 % ointment  Commonly known as: BACTROBAN     omeprazole 40 MG capsule  Commonly known as: PRILOSEC     ondansetron 8 MG Tbdl  Commonly known as: ZOFRAN-ODT     pantoprazole 40 MG tablet  Commonly known as: PROTONIX     REXULTI 2 mg Tab  Generic drug: brexpiprazole  Take 1 tablet by mouth once daily     roflumilast 500 mcg Tab  Commonly known as:  DALIRESP  Take 1 tablet (500 mcg total) by mouth once daily.     * sodium chloride 3% 3 % nebulizer solution     * sodium chloride 3% 3 % nebulizer solution     sucralfate 1 gram tablet  Commonly known as: CARAFATE     temazepam 30 mg capsule  Commonly known as: RESTORIL  TAKE 1 CAPSULE BY MOUTH NIGHTLY AS NEEDED FOR INSOMNIA     TRINTELLIX 10 mg Tab  Generic drug: vortioxetine  Take 1 tablet by mouth daily.           * This list has 2 medication(s) that are the same as other medications prescribed for you. Read the directions carefully, and ask your doctor or other care provider to review them with you.                STOP taking these medications      amLODIPine 10 MG tablet  Commonly known as: NORVASC     valsartan 160 MG tablet  Commonly known as: DIOVAN            ASK your doctor about these medications      DULoxetine 60 MG capsule  Commonly known as: CYMBALTA  Take 1 capsule by mouth once daily     hyoscyamine 0.125 mg Subl               Where to Get Your Medications        These medications were sent to [facility] [place] - [address]   [address] [place] [address]      Phone: [phone]   potassium chloride 10 MEQ Cpsr       Information about where to get these medications is not yet available    Ask your nurse or doctor about these medications  isosorbide mononitrate 30 MG 24 hr tablet  megestroL 40 MG Tab  polyethylene glycol 17 gram Pwpk  scopolamine 1.3-1.5 mg (1 mg over 3 days)  senna-docusate 8.6-50 mg 8.6-50 mg per tablet  spironolactone 25 MG tablet          Explained in detail to the patient about the discharge plan, medications, and follow-up visits. Pt understands and agrees with the treatment plan  Discharge Disposition: Hospice/Home   Discharged Condition: stable  Diet-    Medications Per DC med rec  Activities as tolerated   Follow-up Information       Herberth Amaya MD Follow up on 6/12/2024.    Specialty: Cardiology  Why: @ 2:40PM  Contact  information:  [address]  [facility] [address]  [phone]               Chuck Estrella MD Follow up on 6/4/2024.    Specialty: Family Medicine  Why: Follow up on June 4th 2:00  Contact information:  [address]  [facility] [address]  [phone]                           For further questions contact hospitalist office    Discharge time 33 minutes    For worsening symptoms, chest pain, shortness of breath, increased abdominal pain, high grade fever, stroke or stroke like symptoms, immediately go to the nearest Emergency Room or call 911 as soon as possible.    Cate Hairston DO  Department of Hospital Medicine  [facility]  05/29/2024

## 2024-05-29 NOTE — PROGRESS NOTES
Inpatient Nutrition Assessment    Admit Date: 5/24/2024   Total duration of encounter: 5 days   Patient Age: 73 y.o.    Nutrition Recommendation/Prescription     Modify to Regular Diet to promote po intake.  Encourage Boost Breeze for additional nourishment (250 kcal and 9 gm protein per container).  May need to consider appetite stimulant if aggressive care to continue.    Communication of Recommendations: reviewed with patient and reviewed with family    Nutrition Assessment     Malnutrition Assessment/Nutrition-Focused Physical Exam    Malnutrition Context: chronic illness (05/29/24 1020)  Malnutrition Level: severe (05/29/24 1020)  Energy Intake (Malnutrition): less than 75% for greater than or equal to 3 months (05/29/24 1020)  Weight Loss (Malnutrition): greater than 10% in 6 months (05/29/24 1020)  Subcutaneous Fat (Malnutrition): severe depletion (05/29/24 1020)  Orbital Region (Subcutaneous Fat Loss): severe depletion  Upper Arm Region (Subcutaneous Fat Loss): severe depletion     Muscle Mass (Malnutrition): severe depletion (05/29/24 1020)  Episcopalian Region (Muscle Loss): severe depletion  Clavicle Bone Region (Muscle Loss): severe depletion  Clavicle and Acromion Bone Region (Muscle Loss): severe depletion     Dorsal Hand (Muscle Loss): severe depletion           Fluid Accumulation (Malnutrition): other (see comments) (does not meet criteria) (05/29/24 1020)  Hand  Strength, Left (Malnutrition): unable to evaluate (05/29/24 1020)  Hand  Strength, Right (Malnutrition): unable to evaluate (05/29/24 1020)  A minimum of two characteristics is recommended for diagnosis of either severe or non-severe malnutrition.    Chart Review    Reason Seen: malnutrition screening tool (MST), physician consult for wt loss, and follow-up    Malnutrition Screening Tool Results   Have you recently lost weight without trying?: Yes: 34 lbs or more  Have you been eating poorly because of a decreased appetite?: Yes   MST  Score: 5   Diagnosis:  Severe narrowing of the right distal main pulmonary artery, proximal right upper and lower lobe pulmonary arteries, secondary to left suprahilar mass  Scattered nodularity of the lungs bilaterally  Normocytic anemia, stable   Hypokalemia  Metabolic alkalosis  WISAM  Mild bilateral hydronephrosis  Chronic epigastric abdominal pain , GI extensive workup has been done in the past with no clear cause     Relevant Medical History: HTN, HLD, CAD/Stents, PAD/B/L Stents, GERD, COPD, Fe Def Anemia    Scheduled Medications:  albuterol-ipratropium, 3 mL, Q4H WAKE  ALPRAZolam, 0.25 mg, TID  aspirin, 81 mg, Daily  atorvastatin, 80 mg, Daily  azithromycin, 250 mg, Every Mon, Wed, Fri  dicyclomine, 20 mg, Daily  diltiaZEM, 120 mg, Daily  enoxparin, 30 mg, Q24H  famotidine, 20 mg, Daily  hydrocortisone, , BID  isosorbide mononitrate, 30 mg, Daily  megestroL, 40 mg, Daily  mirtazapine, 45 mg, Nightly  pantoprazole, 40 mg, Daily  polyethylene glycol, 17 g, Daily  roflumilast, 500 mcg, Daily  scopolamine, 1 patch, Q3 Days  senna-docusate 8.6-50 mg, 1 tablet, Daily  spironolactone, 25 mg, Daily  umeclidinium-vilanteroL, 1 puff, Daily  vortioxetine, 10 mg, Daily    Continuous Infusions:  0.45 % NaCl with KCl 20 mEq, Last Rate: 125 mL/hr at 05/29/24 0041    PRN Medications:  acetaminophen, 650 mg, Q4H PRN  hydrALAZINE, 10 mg, Q4H PRN  melatonin, 6 mg, Nightly PRN  ondansetron, 4 mg, Q6H PRN  promethazine, 12.5 mg, Q4H PRN  simethicone, 1 tablet, TID PRN    Calorie Containing IV Medications: no significant kcals from medications at this time    Recent Labs   Lab 05/24/24  1109 05/24/24  1906 05/25/24  0246 05/26/24  0552 05/27/24  0813 05/28/24  0352 05/29/24  0514    146* 145 142 139 141 141   K 2.3* 2.4* 2.4* 2.8* 3.0* 3.5 3.7   CALCIUM 14.2* 14.2* 12.7* 10.6* 10.2 9.7 9.4   PHOS 3.2  --   --   --   --   --   --    MG 2.20  --   --   --   --   --   --    CHLORIDE 101 102 104 104 107 111* 112*   CO2 34* 35* 33*  "30 24 27 21*   BUN 26.8* 24.2* 20.3* 11.4 8.3* 5.6* 5.1*   CREATININE 1.15* 1.22* 1.05* 0.77 0.81 0.78 0.74   EGFRNORACEVR 51 47 57 >60 >60 >60 >60   GLUCOSE 104 122* 126* 88 107 86 84   BILITOT 0.8  --  0.5 0.4 0.5  --   --    ALKPHOS 72  --  67 62 59  --   --    ALT 11  --  13 16 19  --   --    AST 18  --  22 36* 41*  --   --    ALBUMIN 3.1*  --  2.8* 2.8* 2.8*  --   --    WBC 5.72  --  6.33 7.25  --  5.58 5.21   HGB 11.5*  --  9.4* 9.7*  --  8.9* 9.2*   HCT 35.2*  --  28.5* 29.6*  --  26.9* 27.7*     Nutrition Orders:  Diet Low Fiber/Residue  Dietary nutrition supplements Boost Breeze - Wild Berry; BID    Appetite/Oral Intake: poor/25-50% of meals  Factors Affecting Nutritional Intake: decreased appetite  Social Needs Impacting Access to Food: none identified per MD notes  Food/Roman Catholic/Cultural Preferences: unable to obtain  Food Allergies: none reported  Last Bowel Movement: 24  Wound(s):  none documeted    Comments    24: Attempted x2 to speak with pt and complete physical assessment. Staff with pt both times. Noted BMI indicates pt underwt. Also noted per MD notes possible plans for hospice/palliative care. Currently on liquid diet. Will add Boost for additional kcal and protein. Previous EMR wt indicate wt loss over past 2 months.     24: Pt tolerating solids but only taking in a few bites; states that she is going to try the Boost Breeze that is ordered because she cannot drink the regular one. Pt and pt's family member report that pt has had a decreased intake/appetite with significant wt loss since July of last year when her  passed.     Anthropometrics    Height: 5' 2.99" (160 cm),    Last Weight: 38.6 kg (85 lb) (24 1019),    BMI (Calculated): 15.1  BMI Classification: underweight (BMI less than 18.5)     Ideal Body Weight (IBW), Female: 114.95 lb     % Ideal Body Weight, Female (lb): 73.91 %                    Usual Body Weight (UBW), k.6 kg  % Usual Body Weight: " 60.75  % Weight Change From Usual Weight: -39.38 %  Usual Weight Provided By: EMR weight history    Wt Readings from Last 5 Encounters:   05/29/24 38.6 kg (85 lb)   05/20/24 37.9 kg (83 lb 8 oz)   05/02/24 40.8 kg (90 lb)   04/02/24 41.5 kg (91 lb 9.6 oz)   03/01/24 45.3 kg (99 lb 12.8 oz)     Weight Change(s) Since Admission:   Wt Readings from Last 1 Encounters:   05/29/24 1019 38.6 kg (85 lb)   05/24/24 1040 38.6 kg (85 lb)   Admit Weight: 38.6 kg (85 lb) (05/24/24 1040),      Estimated Needs    Weight Used For Calorie Calculations: 38.6 kg (85 lb 1.6 oz)  Energy Calorie Requirements (kcal): 1538kcal (1.2 stress factor +500kcal for wt gain)  Energy Need Method: Linn-St Jeor  Weight Used For Protein Calculations: 38.6 kg (85 lb 1.6 oz)  Protein Requirements: 43-50gm (1.1-1.3g/kg)  Fluid Requirements (mL): 1158ml (30ml/kg)  CHO Requirement: 173gm     Enteral Nutrition     Patient not receiving enteral nutrition at this time.    Parenteral Nutrition     Patient not receiving parenteral nutrition support at this time.    Evaluation of Received Nutrient Intake    Calories: not meeting estimated needs  Protein: not meeting estimated needs    Patient Education     Not applicable.    Nutrition Diagnosis     PES: Increased nutrient needs (kcal) related to inability to consume sufficient nutrients as evidenced by potential wt loss and BMI <18.5. (active)     PES: Severe chronic disease or condition related malnutrition related to suboptimal protein/energy intake as evidenced by less than 75% needs met for greater than or equal to 3 months, severe fat depletion, severe muscle depletion, and greater than 10% weight loss in 6 months. (new)     Nutrition Interventions     Intervention(s): general/healthful diet, commercial beverage, and collaboration with other providers    Goal: Meet greater than 80% of nutritional needs by follow-up. (goal not met)  Goal: Consume % of oral supplements by follow-up.  (new)    Nutrition Goals & Monitoring     Dietitian will monitor: food and beverage intake, energy intake, and electrolyte/renal panel  Discharge planning: continue regular diet with Boost Breeze (or equivalent) oral supplements  Nutrition Risk/Follow-Up: high (follow-up in 1-4 days)   Please consult if re-assessment needed sooner.

## 2024-05-29 NOTE — PROGRESS NOTES
Advance Care Planning     Date: 05/29/2024    Code Status  In light of the patients advanced and life limiting illness,I engaged the the patient in a voluntary conversation about the patient's preferences for care  at the very end of life. The patient wishes to have a natural, peaceful death.  Along those lines, the patient does not wish to have CPR or other invasive treatments performed when her heart and/or breathing stops. I communicated to the patient that a DNR order would be placed in her medical record to reflect this preference, DNR form was completed and will be scanned into EPIC, and LaPOST form was completed to reflect other EOL preferences of the patient such as: A. DNR, B. Comfort Focused Treatment with primary goal is maximizing comfort, would like to avoid hospitalizations unless comfort focused treatment cannot be provided in setting, and C. Elects no artificial nutrition by tube. Notified Dr. Taylor of review of LaPOST selections as per palliative discussions of goals of care. LaPOST completed electronically.     Patient elected to enroll into hospice with [facility] upon discharge.

## 2024-05-29 NOTE — NURSING
Patient off unit via wheelchair with patient transport personnel. [facility] notified as requested at [phone].

## 2024-06-01 PROBLEM — R91.8 MASS OF LEFT LUNG: Status: ACTIVE | Noted: 2024-06-01

## 2024-06-01 PROBLEM — N17.9 ACUTE KIDNEY INJURY: Status: ACTIVE | Noted: 2024-06-01

## 2024-07-03 ENCOUNTER — HOSPITAL ENCOUNTER (EMERGENCY)
Facility: HOSPITAL | Age: 74
Discharge: HOME | End: 2024-07-03
Attending: EMERGENCY MEDICINE
Payer: MEDICARE

## 2024-07-03 VITALS
HEART RATE: 90 BPM | RESPIRATION RATE: 18 BRPM | TEMPERATURE: 98 F | DIASTOLIC BLOOD PRESSURE: 57 MMHG | SYSTOLIC BLOOD PRESSURE: 114 MMHG | OXYGEN SATURATION: 98 %

## 2024-07-03 DIAGNOSIS — E83.52 HYPERCALCEMIA: Primary | ICD-10-CM

## 2024-07-03 DIAGNOSIS — R79.89 ELEVATED TROPONIN: ICD-10-CM

## 2024-07-03 DIAGNOSIS — R55 SYNCOPE, UNSPECIFIED SYNCOPE TYPE: ICD-10-CM

## 2024-07-03 LAB
ALBUMIN SERPL-MCNC: 2.6 G/DL (ref 3.4–4.8)
ALBUMIN/GLOB SERPL: 0.7 RATIO (ref 1.1–2)
ALP SERPL-CCNC: 55 UNIT/L (ref 40–150)
ALT SERPL-CCNC: 8 UNIT/L (ref 0–55)
ANION GAP SERPL CALC-SCNC: 7 MEQ/L
AST SERPL-CCNC: 11 UNIT/L (ref 5–34)
BASOPHILS # BLD AUTO: 0.03 X10(3)/MCL
BASOPHILS NFR BLD AUTO: 0.4 %
BILIRUB SERPL-MCNC: 0.5 MG/DL
BUN SERPL-MCNC: 20.3 MG/DL (ref 9.8–20.1)
CALCIUM SERPL-MCNC: 15.5 MG/DL (ref 8.4–10.2)
CHLORIDE SERPL-SCNC: 102 MMOL/L (ref 98–107)
CO2 SERPL-SCNC: 28 MMOL/L (ref 23–31)
CREAT SERPL-MCNC: 1.73 MG/DL (ref 0.55–1.02)
CREAT/UREA NIT SERPL: 12
EOSINOPHIL # BLD AUTO: 0.18 X10(3)/MCL (ref 0–0.9)
EOSINOPHIL NFR BLD AUTO: 2.3 %
ERYTHROCYTE [DISTWIDTH] IN BLOOD BY AUTOMATED COUNT: 15 % (ref 11.5–17)
ETHANOL SERPL-MCNC: <10 MG/DL
GFR SERPLBLD CREATININE-BSD FMLA CKD-EPI: 31 ML/MIN/1.73/M2
GLOBULIN SER-MCNC: 3.5 GM/DL (ref 2.4–3.5)
GLUCOSE SERPL-MCNC: 96 MG/DL (ref 82–115)
HCT VFR BLD AUTO: 25.6 % (ref 37–47)
HGB BLD-MCNC: 8.5 G/DL (ref 12–16)
IMM GRANULOCYTES # BLD AUTO: 0.02 X10(3)/MCL (ref 0–0.04)
IMM GRANULOCYTES NFR BLD AUTO: 0.3 %
LYMPHOCYTES # BLD AUTO: 1.31 X10(3)/MCL (ref 0.6–4.6)
LYMPHOCYTES NFR BLD AUTO: 16.7 %
MAGNESIUM SERPL-MCNC: 1.9 MG/DL (ref 1.6–2.6)
MCH RBC QN AUTO: 28.5 PG (ref 27–31)
MCHC RBC AUTO-ENTMCNC: 33.2 G/DL (ref 33–36)
MCV RBC AUTO: 85.9 FL (ref 80–94)
MONOCYTES # BLD AUTO: 0.75 X10(3)/MCL (ref 0.1–1.3)
MONOCYTES NFR BLD AUTO: 9.6 %
NEUTROPHILS # BLD AUTO: 5.54 X10(3)/MCL (ref 2.1–9.2)
NEUTROPHILS NFR BLD AUTO: 70.7 %
NRBC BLD AUTO-RTO: 0 %
PLATELET # BLD AUTO: 344 X10(3)/MCL (ref 130–400)
PMV BLD AUTO: 9.7 FL (ref 7.4–10.4)
POTASSIUM SERPL-SCNC: 3.9 MMOL/L (ref 3.5–5.1)
PROT SERPL-MCNC: 6.1 GM/DL (ref 5.8–7.6)
RBC # BLD AUTO: 2.98 X10(6)/MCL (ref 4.2–5.4)
SODIUM SERPL-SCNC: 137 MMOL/L (ref 136–145)
TROPONIN I SERPL-MCNC: 0.07 NG/ML (ref 0–0.04)
TSH SERPL-ACNC: 1.68 UIU/ML (ref 0.35–4.94)
WBC # BLD AUTO: 7.83 X10(3)/MCL (ref 4.5–11.5)

## 2024-07-03 PROCEDURE — 84443 ASSAY THYROID STIM HORMONE: CPT | Performed by: EMERGENCY MEDICINE

## 2024-07-03 PROCEDURE — 99284 EMERGENCY DEPT VISIT MOD MDM: CPT

## 2024-07-03 PROCEDURE — 85025 COMPLETE CBC W/AUTO DIFF WBC: CPT | Performed by: EMERGENCY MEDICINE

## 2024-07-03 PROCEDURE — 80053 COMPREHEN METABOLIC PANEL: CPT | Performed by: EMERGENCY MEDICINE

## 2024-07-03 PROCEDURE — 82077 ASSAY SPEC XCP UR&BREATH IA: CPT | Performed by: EMERGENCY MEDICINE

## 2024-07-03 PROCEDURE — 83735 ASSAY OF MAGNESIUM: CPT | Performed by: EMERGENCY MEDICINE

## 2024-07-03 PROCEDURE — 84484 ASSAY OF TROPONIN QUANT: CPT | Performed by: EMERGENCY MEDICINE

## 2024-07-03 RX ORDER — SODIUM CHLORIDE 9 MG/ML
1000 INJECTION, SOLUTION INTRAVENOUS
Status: DISCONTINUED | OUTPATIENT
Start: 2024-07-03 | End: 2024-07-04 | Stop reason: HOSPADM

## 2024-07-04 NOTE — ED PROVIDER NOTES
Encounter Date: 7/3/2024       History     Chief Complaint   Patient presents with    Loss of Consciousness     Arrives med exp unit 151 from home was on toilet and had syncopal episode, ems reports elevation in leads 2 3 v2-6 - 4x 81mg asa and 4x nitro, did report some chest discomfort, no trauma/fall w/ syncopal episode     This is a 73-year-old hospice patient who has a history of MAC and a lung mass that is not amenable to biopsy because of her overall frail for health status.  She was chronically on oxygen at home 2-2-1/2 L with oxygen saturation of about 95% typically.  The patient says that she was in her usual state of health today and she went to the bathroom to have a bowel movement and that has the last thing she recalls.  Her family found her on the floor next to the commode said that she did have some chest pain EN route with the ambulance but has no chest pain right now.  She did have a stent in her heart about 3 years ago with Dr. Amaya.    I discussed the patient's hospice status she says that it was her wish that she be kept comfortable she does not want any CPR or intubation she further states that she would rather be kept comfortable then have any in-depth investigation into any condition.  She understands that she has a terminal condition and would just like to be kept comfortable at home.  The patient's daughter is at the bedside who has the patient's power of  said that she would have honored these wishes however she spoke with her sister who lives in [place] who asked that the patient be transported to the emergency department because at the time the patient was unresponsive.      I had a very long discussion with both the patient's daughter granddaughter and the patient herself about her goals of care she was adamant that she wants to be kept comfortable and does not want any in-depth investigation into anything and does not want to be treated aggressively for anything her goal is to  be at home and comfortable.      Review of patient's allergies indicates:   Allergen Reactions    No known drug allergies      Past Medical History:   Diagnosis Date    Abdominal pain, LLQ     Abdominal pain, RLQ     Acute bilateral low back pain with left-sided sciatica     Anxiety     Cardiac microvascular disease     Carotid bruit     Constipation, unspecified     Depressive disorder     Diverticulitis     Dyslipidemia     GERD (gastroesophageal reflux disease), chronic     Heart valve disorder     Iron deficiency anemia     Moderate aortic regurgitation     Primary hypertension     PTCA with stent insertion     Status post laser ablation of incompetent vein     Thyroid enlarged     Varicose veins of bilateral lower extremities with other complications     Venous insufficiency      Past Surgical History:   Procedure Laterality Date    COLONOSCOPY N/A 2/6/2023    Procedure: COLON;  Surgeon: Dorothea Davey III, MD;  Location: [facility] ENDOSCOPY;  Service: Gastroenterology;  Laterality: N/A;    COLONOSCOPY, WITH POLYPECTOMY USING SNARE  2/6/2023    Procedure: COLONOSCOPY, WITH POLYPECTOMY USING SNARE;  Surgeon: Dorothea Davey III, MD;  Location: [facility] ENDOSCOPY;  Service: Gastroenterology;;    EGD, WITH CLOSED BIOPSY N/A 3/7/2023    Procedure: EGD;  Surgeon: Dorothea Davey III, MD;  Location: [facility] ENDOSCOPY;  Service: Gastroenterology;  Laterality: N/A;  Pre OP packet for procedure was mailed to patient     Family History   Problem Relation Name Age of Onset    Heart attack Mother      COPD Father          sepsis    No Known Problems Brother       Social History     Tobacco Use    Smoking status: Never     Passive exposure: Never    Smokeless tobacco: Never   Substance Use Topics    Alcohol use: Never    Drug use: Never     Review of Systems   Respiratory:  Negative for shortness of breath.    Cardiovascular:  Negative for chest pain.   Gastrointestinal:  Negative for abdominal pain.       Physical Exam      Initial Vitals [07/03/24 1912]   BP Pulse Resp Temp SpO2   (!) 114/57 90 18 98 °F (36.7 °C) 98 %      MAP       --         Physical Exam    HENT:   Head: Normocephalic.   Eyes: EOM are normal. Left eye exhibits no discharge. No scleral icterus.   Cardiovascular:  Regular rhythm.           Pulmonary/Chest: No stridor. No respiratory distress.   Abdominal: She exhibits no distension.   Musculoskeletal:         General: Normal range of motion.     Neurological: She is alert and oriented to person, place, and time. She has normal strength.   Skin: Skin is dry. No rash noted. No erythema. No pallor.   Psychiatric: She has a normal mood and affect. Her behavior is normal. Judgment and thought content normal.         ED Course   Procedures  Labs Reviewed   COMPREHENSIVE METABOLIC PANEL - Abnormal; Notable for the following components:       Result Value    Blood Urea Nitrogen 20.3 (*)     Creatinine 1.73 (*)     Calcium 15.5 (*)     Albumin 2.6 (*)     Albumin/Globulin Ratio 0.7 (*)     All other components within normal limits   TROPONIN I - Abnormal; Notable for the following components:    Troponin-I 0.072 (*)     All other components within normal limits   CBC WITH DIFFERENTIAL - Abnormal; Notable for the following components:    RBC 2.98 (*)     Hgb 8.5 (*)     Hct 25.6 (*)     All other components within normal limits   ALCOHOL,MEDICAL (ETHANOL) - Normal   MAGNESIUM - Normal   TSH - Normal   CBC W/ AUTO DIFFERENTIAL    Narrative:     The following orders were created for panel order CBC auto differential.  Procedure                               Abnormality         Status                     ---------                               -----------         ------                     CBC with Differential[[id number]]       Abnormal            Final result                 Please view results for these tests on the individual orders.   DRUG SCREEN, URINE (BEAKER)   URINALYSIS, REFLEX TO URINE CULTURE     EKG Readings:  (Independently Interpreted)   Other Impression: I did go over the EKG findings with the patient and told her that I had some concerns, what she was on hospice and would like to go home.   Rate of 92 normal sinus rhythm bifascicular block pronounced T-waves in the lateral leads T-wave inversions in the septal leads.       Imaging Results    None          Medications   0.9%  NaCl infusion (has no administration in time range)     Medical Decision Making  Amount and/or Complexity of Data Reviewed  Labs: ordered.    Risk  Prescription drug management.               ED Course as of 07/03/24 2157 Wed Jul 03, 2024 2149 Pt says she wants to go home on hospice, she really did not want to come to the hospital but her family sent her.  Patient says she understands that her EKG is abnormal and her troponin in his slightly elevated in her calcium his eye.  I told her that she may have a heart attack or cardiac event and that she could even go home and is a come to this and die.  I explained this is very plain language with the granddaughter in the room they said they understand but it was her wish that she goes home and be made comfortable at home [NL]      ED Course User Index  [NL] Jeferson Moreno MD                           Clinical Impression:  Final diagnoses:  [E83.52] Hypercalcemia (Primary)  [R79.89] Elevated troponin  [R55] Syncope, unspecified syncope type          ED Disposition Condition    Discharge Stable          ED Prescriptions    None       Follow-up Information       Follow up With Specialties Details Why Contact Info    Chuck Estrella MD Family Medicine In 1 week  [address]  [facility] [address] [phone]               Jeferson Moreno MD  07/03/24 2157

## 2024-07-05 ENCOUNTER — PATIENT OUTREACH (OUTPATIENT)
Dept: EMERGENCY MEDICINE | Facility: HOSPITAL | Age: 74
End: 2024-07-05
Payer: MEDICARE

## 2024-07-07 LAB
OHS QRS DURATION: 120 MS
OHS QTC CALCULATION: 447 MS

## 2024-08-12 ENCOUNTER — PATIENT OUTREACH (OUTPATIENT)
Facility: CLINIC | Age: 74
End: 2024-08-12
Payer: MEDICARE

## 2024-08-12 NOTE — PROGRESS NOTES
Health Maintenance Topic(s) Outreach Outcomes & Actions Taken:  Chart review      Blood Pressure - Outreach Outcomes & Actions Taken  : BP is low       Additional Notes:   Qualifies for frailty of care.         Care Management, Digital Medicine, and/or Education Referrals  OPCM DM, HTN

## 2024-09-02 PROBLEM — N17.9 ACUTE KIDNEY INJURY: Status: RESOLVED | Noted: 2024-06-01 | Resolved: 2024-09-02

## (undated) DEVICE — BAG LABGUARD BIOHAZARD 6X9IN

## (undated) DEVICE — TUBING O2 FEMALE CONN 13FT

## (undated) DEVICE — KIT SURGICAL COLON .25 1.1OZ

## (undated) DEVICE — UNDERPAD DISPOSABLE 30X30IN

## (undated) DEVICE — KIT CANIST SUCTION 1200CC

## (undated) DEVICE — TIP SUCTION YANKAUER

## (undated) DEVICE — BAG BIO SPECIMEN W/POCKET 6X9

## (undated) DEVICE — COLLECTION SPECIMEN NEPTUNE

## (undated) DEVICE — SOL IRRI STRL WATER 1000ML

## (undated) DEVICE — ADAPTER DUAL NSL LUER M-M 7FT

## (undated) DEVICE — SNARE CAPTIFLEX 2.4X27MM 240CM

## (undated) DEVICE — CONTAINER SPECIMEN SCREW 4OZ

## (undated) DEVICE — CONTAINER SPECIMEN 4OZ

## (undated) DEVICE — BLOCK BLOX BITE DENT RIM 54FR

## (undated) DEVICE — FORCEP BX LG CAP 2.8MMX240CM